# Patient Record
Sex: MALE | Race: WHITE | NOT HISPANIC OR LATINO | Employment: FULL TIME | URBAN - NONMETROPOLITAN AREA
[De-identification: names, ages, dates, MRNs, and addresses within clinical notes are randomized per-mention and may not be internally consistent; named-entity substitution may affect disease eponyms.]

---

## 2017-08-07 ENCOUNTER — HOSPITAL ENCOUNTER (EMERGENCY)
Facility: HOSPITAL | Age: 52
Discharge: HOME | End: 2017-08-07
Attending: NURSE PRACTITIONER | Admitting: NURSE PRACTITIONER
Payer: COMMERCIAL

## 2017-08-07 VITALS
BODY MASS INDEX: 30.13 KG/M2 | TEMPERATURE: 97.8 F | RESPIRATION RATE: 16 BRPM | OXYGEN SATURATION: 97 % | SYSTOLIC BLOOD PRESSURE: 139 MMHG | DIASTOLIC BLOOD PRESSURE: 97 MMHG | WEIGHT: 210 LBS

## 2017-08-07 DIAGNOSIS — L03.311 CELLULITIS OF ABDOMINAL WALL: ICD-10-CM

## 2017-08-07 PROCEDURE — 99213 OFFICE O/P EST LOW 20 MIN: CPT

## 2017-08-07 PROCEDURE — 99213 OFFICE O/P EST LOW 20 MIN: CPT | Performed by: NURSE PRACTITIONER

## 2017-08-07 PROCEDURE — 87070 CULTURE OTHR SPECIMN AEROBIC: CPT | Performed by: NURSE PRACTITIONER

## 2017-08-07 RX ORDER — SULFAMETHOXAZOLE/TRIMETHOPRIM 800-160 MG
1 TABLET ORAL 2 TIMES DAILY
Qty: 14 TABLET | Refills: 0 | Status: SHIPPED | OUTPATIENT
Start: 2017-08-07 | End: 2017-08-14

## 2017-08-07 NOTE — ED PROVIDER NOTES
"  History     Chief Complaint   Patient presents with     Wound Check     reddened area below umbilicus. pt states the small open area drained today after getting out of the shower. denies fever or chills.      The history is provided by the patient. No  was used.     Asif Leon is a 52 year old male who presents for a wound check. He's had a cyst on his abdomen \"for years\" that started draining 4 day ago. The cyst is red and he is concerned for infection. No interventions for symptoms. Denies injury or trauma. Denies fever, chills, or night sweats. Eating and drinking well. Bowel and bladder are working well. No antibiotic use in the past 30 days.     I have reviewed the Medications, Allergies, Past Medical and Surgical History, and Social History in the Epic system.    Review of Systems   Constitutional: Negative for activity change, appetite change, chills and fever.   HENT: Negative for trouble swallowing.    Respiratory: Negative for cough.    Gastrointestinal: Negative for abdominal pain, diarrhea, nausea and vomiting.   Genitourinary: Negative for dysuria.   Skin: Positive for color change and wound. Negative for rash.        On abdomen.    Psychiatric/Behavioral: Negative.        Physical Exam   BP: 139/97  Heart Rate: 78  Temp: 97.8  F (36.6  C)  Resp: 16  Weight: 95.3 kg (210 lb)  SpO2: 97 %  Physical Exam   Constitutional: He is oriented to person, place, and time. He appears well-developed and well-nourished. No distress.   HENT:   Head: Normocephalic.   Mouth/Throat: Oropharynx is clear and moist.   Neck: Normal range of motion. Neck supple.   Cardiovascular: Normal rate, regular rhythm, normal heart sounds and intact distal pulses.    No murmur heard.  Pulmonary/Chest: Effort normal. No respiratory distress. He has no wheezes. He has no rales.   Abdominal: Soft. He exhibits no distension.   Musculoskeletal: Normal range of motion.   Lymphadenopathy:     He has no cervical " adenopathy.   Neurological: He is alert and oriented to person, place, and time.   Skin: Skin is warm and dry. He is not diaphoretic. There is erythema.        4 cm X 3 cm area of erythema below umbilicus with white purulent drainage. Area of erythema is warm to the touch. TTP to area of erythema.    Psychiatric: He has a normal mood and affect.   Nursing note and vitals reviewed.      ED Course     ED Course     Procedures    Wound culture obtained.     Assessments & Plan (with Medical Decision Making)     Discussed plan of care. He verbalized understanding. All questions answered.     I have reviewed the nursing notes.    I have reviewed the findings, diagnosis, plan and need for follow up with the patient.  Discharged in stable condition.     New Prescriptions    SULFAMETHOXAZOLE-TRIMETHOPRIM (BACTRIM DS/SEPTRA DS) 800-160 MG PER TABLET    Take 1 tablet by mouth 2 times daily for 7 days       Final diagnoses:   Cellulitis of abdominal wall     Take antibiotics as ordered.   Eat a yogurt daily while taking antibiotics.   Apply a warm washcloth 3-4 times a day to area of redness. Protect skin.   Take tylenol and or ibuprofen for pain. Follow dosing on package.   Follow up with PCP in 10 days for wound check and BP check.   Return to urgent care or emergency department with any increase in symptoms or concerns.     CHINMAY Moncada  8/7/2017  5:04 PM  [facility]       Irma Blanchard NP  08/13/17 9460

## 2017-08-07 NOTE — ED AVS SNAPSHOT
[facility]    [address]    [place] [address]    Phone:  [phone]                                       Asif Leon   MRN: [id number]    Department:  [facility]   Date of Visit:  8/7/2017           After Visit Summary Signature Page     I have received my discharge instructions, and my questions have been answered. I have discussed any challenges I see with this plan with the nurse or doctor.    ..........................................................................................................................................  Patient/Patient Representative Signature      ..........................................................................................................................................  Patient Representative Print Name and Relationship to Patient    ..................................................               ................................................  Date                                            Time    ..........................................................................................................................................  Reviewed by Signature/Title    ...................................................              ..............................................  Date                                                            Time

## 2017-08-07 NOTE — DISCHARGE INSTRUCTIONS
Take antibiotics as ordered.   Eat a yogurt daily while taking antibiotics.   Apply a warm washcloth 3-4 times a day to area of redness. Protect skin.   Take tylenol and or ibuprofen for pain. Follow dosing on package.   Follow up with PCP in 10 days for wound check and BP check.   Return to urgent care or emergency department with any increase in symptoms or concerns.

## 2017-08-07 NOTE — ED NOTES
"Patient presents with sore below umbilic .  Drainage X 4 days with \"greenish\" color.  Red and sore X 4 days.  Cyst was there for 15/20 years.  "

## 2017-08-07 NOTE — ED AVS SNAPSHOT
[facility]    [address] [address]    Phone:  [phone]                                       Asif Leon   MRN: [id number]    Department:  [facility]   Date of Visit:  8/7/2017           Patient Information     Date Of Birth          1965        Your diagnoses for this visit were:     Cellulitis of abdominal wall        You were seen by Irma Blanchard NP.      Follow-up Information     Follow up with Laury Trivedi NP In 10 days.    Specialties:  Family Practice, Psychiatry    Why:  For wound and BP check.     Contact information:     [facility]  [address] [address] [phone]          Follow up with [facility].    Specialty:  EMERGENCY MEDICINE    Why:  As needed, If symptoms worsen    Contact information:    [address] [address] [phone]    Additional information:    From [place] Area: Take [address]. Turn left at [address]. Turn left at the first stoplight on [address]. At the first stop sign, take a right onto [address]. Take a left into the parking lot and continue through until you reach the North enterance of the building.       From [place]: Take [address]. Take the [address] ramp towards [place]. Turn left onto [address]. Take a slight right onto [address]. Turn left at the first stoplight on [address]. At the first stop sign, take a right onto [address]. Take a left into the parking lot and continue through until you reach the North enterance of the building.       From [place]: Take [address]. Take a right at [address]. At the first stop sign, take a right onto [address]. Take a left into the parking lot and continue through until you reach the North enterance of the building.         Discharge Instructions       Take antibiotics as ordered.   Eat a yogurt daily while  taking antibiotics.   Apply a warm washcloth 3-4 times a day to area of redness. Protect skin.   Take tylenol and or ibuprofen for pain. Follow dosing on package.   Follow up with PCP in 10 days for wound check and BP check.   Return to urgent care or emergency department with any increase in symptoms or concerns.     Discharge References/Attachments     SKIN INFECTION, CELLULITIS (ENGLISH)         Review of your medicines      START taking        Dose / Directions Last dose taken    sulfamethoxazole-trimethoprim 800-160 MG per tablet   Commonly known as:  BACTRIM DS/SEPTRA DS   Dose:  1 tablet   Quantity:  14 tablet        Take 1 tablet by mouth 2 times daily for 7 days   Refills:  0          Our records show that you are taking the medicines listed below. If these are incorrect, please call your family doctor or clinic.        Dose / Directions Last dose taken    HYDROcodone-acetaminophen 5-325 MG per tablet   Commonly known as:  NORCO   Dose:  1 tablet   Quantity:  40 tablet        Take 1 tablet by mouth nightly as needed for moderate to severe pain   Refills:  0        ibuprofen 800 MG tablet   Commonly known as:  ADVIL/MOTRIN   Dose:  800 mg   Quantity:  90 tablet        Take 1 tablet (800 mg) by mouth every 8 hours as needed for moderate pain   Refills:  1        NO ACTIVE MEDICATIONS        Refills:  0                Prescriptions were sent or printed at these locations (1 Prescription)                   [facility] [address] [place] [address] [address]    Telephone:  [phone]   Fax:  [phone]   Hours:                  E-Prescribed (1 of 1)         sulfamethoxazole-trimethoprim (BACTRIM DS/SEPTRA DS) 800-160 MG per tablet                Procedures and tests performed during your visit     Wound Culture Aerobic Bacterial      Orders Needing Specimen Collection     None      Pending Results     Date and Time Order Name Status Description    8/7/2017 5021  Wound Culture Aerobic Bacterial In process             Pending Culture Results     Date and Time Order Name Status Description    8/7/2017 1752 Wound Culture Aerobic Bacterial In process             Thank you for choosing [facility]       Thank you for choosing [facility] for your care. Our goal is always to provide you with excellent care. Hearing back from our patients is one way we can continue to improve our services. Please take a few minutes to complete the written survey that you may receive in the mail after you visit with us. Thank you!        Care EveryWhere ID     This is your Care EveryWhere ID. This could be used by other organizations to access your [facility] medical records  [id number]        Equal Access to Services     SONALI ALLEN : Carla Ingram, jorge rodriguez, alirio cam, salinas green. So [facility] [phone].    ATENCIÓN: Si habla español, tiene a porter disposición servicios gratuitos de asistencia lingüística. Llame al [phone].    We comply with applicable federal civil rights laws and [place] laws. We do not discriminate on the basis of race, color, national origin, age, disability sex, sexual orientation or gender identity.            After Visit Summary       This is your record. Keep this with you and show to your community pharmacist(s) and doctor(s) at your next visit.

## 2017-08-11 LAB
BACTERIA SPEC CULT: NORMAL
MICRO REPORT STATUS: NORMAL
SPECIMEN SOURCE: NORMAL

## 2017-08-13 ASSESSMENT — ENCOUNTER SYMPTOMS
DIARRHEA: 0
APPETITE CHANGE: 0
COUGH: 0
ACTIVITY CHANGE: 0
PSYCHIATRIC NEGATIVE: 1
COLOR CHANGE: 1
CHILLS: 0
WOUND: 1
FEVER: 0
NAUSEA: 0
TROUBLE SWALLOWING: 0
DYSURIA: 0
VOMITING: 0
ABDOMINAL PAIN: 0

## 2017-08-22 ENCOUNTER — OFFICE VISIT (OUTPATIENT)
Dept: FAMILY MEDICINE | Facility: OTHER | Age: 52
End: 2017-08-22
Attending: NURSE PRACTITIONER
Payer: COMMERCIAL

## 2017-08-22 VITALS
TEMPERATURE: 98 F | SYSTOLIC BLOOD PRESSURE: 124 MMHG | BODY MASS INDEX: 29.49 KG/M2 | DIASTOLIC BLOOD PRESSURE: 78 MMHG | OXYGEN SATURATION: 96 % | HEART RATE: 79 BPM | HEIGHT: 70 IN | RESPIRATION RATE: 20 BRPM | WEIGHT: 206 LBS

## 2017-08-22 DIAGNOSIS — J20.9 ACUTE BRONCHITIS WITH SYMPTOMS > 10 DAYS: ICD-10-CM

## 2017-08-22 DIAGNOSIS — R05.9 COUGH: Primary | ICD-10-CM

## 2017-08-22 PROCEDURE — 71020 ZZHC CHEST TWO VIEWS, FRONT/LAT: CPT | Mod: TC | Performed by: RADIOLOGY

## 2017-08-22 PROCEDURE — 99213 OFFICE O/P EST LOW 20 MIN: CPT | Performed by: NURSE PRACTITIONER

## 2017-08-22 RX ORDER — ALBUTEROL SULFATE 90 UG/1
2 AEROSOL, METERED RESPIRATORY (INHALATION) EVERY 4 HOURS PRN
Qty: 1 INHALER | Refills: 0 | Status: SHIPPED | OUTPATIENT
Start: 2017-08-22 | End: 2018-01-15

## 2017-08-22 RX ORDER — AZITHROMYCIN 250 MG/1
TABLET, FILM COATED ORAL
Qty: 6 TABLET | Refills: 0 | Status: SHIPPED | OUTPATIENT
Start: 2017-08-22 | End: 2018-01-15

## 2017-08-22 ASSESSMENT — ANXIETY QUESTIONNAIRES
5. BEING SO RESTLESS THAT IT IS HARD TO SIT STILL: SEVERAL DAYS
2. NOT BEING ABLE TO STOP OR CONTROL WORRYING: MORE THAN HALF THE DAYS
4. TROUBLE RELAXING: MORE THAN HALF THE DAYS
7. FEELING AFRAID AS IF SOMETHING AWFUL MIGHT HAPPEN: NOT AT ALL
GAD7 TOTAL SCORE: 11
6. BECOMING EASILY ANNOYED OR IRRITABLE: MORE THAN HALF THE DAYS
3. WORRYING TOO MUCH ABOUT DIFFERENT THINGS: MORE THAN HALF THE DAYS
IF YOU CHECKED OFF ANY PROBLEMS ON THIS QUESTIONNAIRE, HOW DIFFICULT HAVE THESE PROBLEMS MADE IT FOR YOU TO DO YOUR WORK, TAKE CARE OF THINGS AT HOME, OR GET ALONG WITH OTHER PEOPLE: SOMEWHAT DIFFICULT
1. FEELING NERVOUS, ANXIOUS, OR ON EDGE: MORE THAN HALF THE DAYS

## 2017-08-22 ASSESSMENT — PATIENT HEALTH QUESTIONNAIRE - PHQ9: SUM OF ALL RESPONSES TO PHQ QUESTIONS 1-9: 12

## 2017-08-22 ASSESSMENT — PAIN SCALES - GENERAL: PAINLEVEL: SEVERE PAIN (6)

## 2017-08-22 NOTE — PATIENT INSTRUCTIONS
ASSESSMENT/PLAN:       1. Cough  - XR CHEST 2 VW (Clinic Performed); clear    2. Acute bronchitis with symptoms > 10 days  symptomatic  - azithromycin (ZITHROMAX) 250 MG tablet; Two tablets first day, then one tablet daily for four days.  Dispense: 6 tablet; Refill: 0  - albuterol (PROAIR HFA/PROVENTIL HFA/VENTOLIN HFA) 108 (90 BASE) MCG/ACT Inhaler; Inhale 2 puffs into the lungs every 4 hours as needed  Dispense: 1 Inhaler; Refill: 0  - fluticasone-salmeterol (ADVAIR) 100-50 MCG/DOSE diskus inhaler; Inhale 1 puff into the lungs 2 times daily  Dispense: 1 Inhaler; Refill: 1    FUTURE APPOINTMENTS:       - Follow-up visit if no improvement or any worsening    Elizabeth Lugo, NP  [facility]    Psychologists/ counselors  [place]vel[place]  [place]  [phone]  Dr. Luis Silva [phone]  [facility]  [phone]  [facility] [phone]  Chris Jacob  [phone]   [employer]  [phone]  (kids)  [employer] [phone]  (teens)  [facility]      [phone]  [facility] [phone]    [facility]     [phone]   [facility] counseling [phone]  Josiah Reza [phone]  Sophia Frank [phone]  [facility]     [phone]  [facility] behavioral/ adult family     [phone]  [facility]     [phone]  Matthew Quesada  [phone]  [facility]     [phone]  [facility] Dr. VIOLETA Hathaway     [phone]

## 2017-08-22 NOTE — MR AVS SNAPSHOT
After Visit Summary   8/22/2017    Asif Leon    MRN: [id number]           Patient Information     Date Of Birth          1965        Visit Information        Provider Department      8/22/2017 4:15 PM Elizabeth Lugo NP [facility]        Today's Diagnoses     Cough    -  1    Acute bronchitis with symptoms > 10 days          Care Instructions      ASSESSMENT/PLAN:       1. Cough  - XR CHEST 2 VW (Clinic Performed); clear    2. Acute bronchitis with symptoms > 10 days  symptomatic  - azithromycin (ZITHROMAX) 250 MG tablet; Two tablets first day, then one tablet daily for four days.  Dispense: 6 tablet; Refill: 0  - albuterol (PROAIR HFA/PROVENTIL HFA/VENTOLIN HFA) 108 (90 BASE) MCG/ACT Inhaler; Inhale 2 puffs into the lungs every 4 hours as needed  Dispense: 1 Inhaler; Refill: 0  - fluticasone-salmeterol (ADVAIR) 100-50 MCG/DOSE diskus inhaler; Inhale 1 puff into the lungs 2 times daily  Dispense: 1 Inhaler; Refill: 1    FUTURE APPOINTMENTS:       - Follow-up visit if no improvement or any worsening    Elizabeth Lugo NP  [facility]    Psychologists/ counselors  Avelino [place]  [phone]  Dr. Luis Silva [phone]  [facility]  [phone]  [facility] [phone]  Chris Jacob  [phone]   [employer]  [phone]  (kids)  [employer] [phone]  (teens)  [facility]      [phone]  [facility] [phone]    [facility]     [phone]   [facility] counseling [phone]  Josiah Reza [phone]  Sophia Frank [phone]  [facility]     [phone]  Childrens behavioral/ adult family     [phone]  [facility]     [phone]  Matthew Quesada  [phone]  [facility]     [phone]  Ishan MCDANIEL  "Rivas     [phone]                        Follow-ups after your visit        Who to contact     If you have questions or need follow up information about today's clinic visit or your schedule please contact [facility] directly at [phone].  Normal or non-critical lab and imaging results will be communicated to you by MyChart, letter or phone within 4 business days after the clinic has received the results. If you do not hear from us within 7 days, please contact the clinic through MyChart or phone. If you have a critical or abnormal lab result, we will notify you by phone as soon as possible.  Submit refill requests through [employer] or call your pharmacy and they will forward the refill request to us. Please allow 3 business days for your refill to be completed.          Additional Information About Your Visit        Care EveryWhere ID     This is your Care EveryWhere ID. This could be used by other organizations to access your [place] medical records  [id number]        Your Vitals Were     Pulse Temperature Respirations Height Pulse Oximetry BMI (Body Mass Index)    79 98  F (36.7  C) (Tympanic) 20 5' 10\" (1.778 m) 96% 29.56 kg/m2       Blood Pressure from Last 3 Encounters:   08/22/17 124/78   08/07/17 139/97   02/16/15 114/80    Weight from Last 3 Encounters:   08/22/17 206 lb (93.4 kg)   08/07/17 210 lb (95.3 kg)   02/16/15 196 lb 9.6 oz (89.2 kg)              We Performed the Following     XR CHEST 2 VW (Clinic Performed)          Today's Medication Changes          These changes are accurate as of: 8/22/17  4:49 PM.  If you have any questions, ask your nurse or doctor.               Start taking these medicines.        Dose/Directions    albuterol 108 (90 BASE) MCG/ACT Inhaler   Commonly known as:  PROAIR HFA/PROVENTIL HFA/VENTOLIN HFA   Used for:  Acute bronchitis with symptoms > 10 days   Started by:  Elizabeth Lugo NP        Dose:  2 puff   Inhale 2 puffs into the lungs " every 4 hours as needed   Quantity:  1 Inhaler   Refills:  0       azithromycin 250 MG tablet   Commonly known as:  ZITHROMAX   Used for:  Acute bronchitis with symptoms > 10 days   Started by:  Elizabeth Lugo NP        Two tablets first day, then one tablet daily for four days.   Quantity:  6 tablet   Refills:  0       fluticasone-salmeterol 100-50 MCG/DOSE diskus inhaler   Commonly known as:  ADVAIR   Used for:  Acute bronchitis with symptoms > 10 days   Started by:  Elizabeth Lugo NP        Dose:  1 puff   Inhale 1 puff into the lungs 2 times daily   Quantity:  1 Inhaler   Refills:  1         Stop taking these medicines if you haven't already. Please contact your care team if you have questions.     NO ACTIVE MEDICATIONS   Stopped by:  Elizabeth Lugo NP                Where to get your medicines      These medications were sent to [place] [address] [place] [address] [address]     Phone:  [phone]     albuterol 108 (90 BASE) MCG/ACT Inhaler    azithromycin 250 MG tablet    fluticasone-salmeterol 100-50 MCG/DOSE diskus inhaler                Primary Care Provider Office Phone # Fax #    Laury GABRIEL Trivedi [phone]       [address] [address]        Equal Access to Services     SONALI ALLEN AH: Hadii berta henderson hadasho Soomaali, waaxda luqadaha, qaybta kaalmada dorina, salinas green. So [facility] [phone].    ATENCIÓN: Si habla español, tiene a porter disposición servicios gratuitos de asistencia lingüística. Roberth al [phone].    We comply with applicable federal civil rights laws and [place] laws. We do not discriminate on the basis of race, color, national origin, age, disability sex, sexual orientation or gender identity.            Thank you!     Thank you for choosing [facility]  for your care. Our goal is always to provide you with excellent  care. Hearing back from our patients is one way we can continue to improve our services. Please take a few minutes to complete the written survey that you may receive in the mail after your visit with us. Thank you!             Your Updated Medication List - Protect others around you: Learn how to safely use, store and throw away your medicines at www.disposemymeds.org.          This list is accurate as of: 8/22/17  4:49 PM.  Always use your most recent med list.                   Brand Name Dispense Instructions for use Diagnosis    albuterol 108 (90 BASE) MCG/ACT Inhaler    PROAIR HFA/PROVENTIL HFA/VENTOLIN HFA    1 Inhaler    Inhale 2 puffs into the lungs every 4 hours as needed    Acute bronchitis with symptoms > 10 days       azithromycin 250 MG tablet    ZITHROMAX    6 tablet    Two tablets first day, then one tablet daily for four days.    Acute bronchitis with symptoms > 10 days       fluticasone-salmeterol 100-50 MCG/DOSE diskus inhaler    ADVAIR    1 Inhaler    Inhale 1 puff into the lungs 2 times daily    Acute bronchitis with symptoms > 10 days

## 2017-08-22 NOTE — NURSING NOTE
"Chief Complaint   Patient presents with     Cough       Initial /78 (BP Location: Left arm, Patient Position: Chair, Cuff Size: Adult Large)  Pulse 79  Temp 98  F (36.7  C) (Tympanic)  Resp 20  Ht 5' 10\" (1.778 m)  Wt 206 lb (93.4 kg)  SpO2 96%  BMI 29.56 kg/m2 Estimated body mass index is 29.56 kg/(m^2) as calculated from the following:    Height as of this encounter: 5' 10\" (1.778 m).    Weight as of this encounter: 206 lb (93.4 kg).  Medication Reconciliation: complete   Pamela M Lechevalier LPN      "

## 2017-08-22 NOTE — PROGRESS NOTES
SUBJECTIVE:   Asif Leon is a 52 year old male who presents to clinic today for the following health issues:  Cough     Acute Illness   Acute illness concerns: cough with shortness of breath  Onset: a week onset after antibiotic 8/15/17    Fever: no     Chills/Sweats: no     Headache (location?): no     Sinus Pressure:no    Conjunctivitis:  no    Ear Pain: no    Rhinorrhea: no     Congestion: YES    Sore Throat: no      Cough: YES-productive of yellow sputum    Wheeze: YES    Decreased Appetite: no     Nausea: no     Vomiting: no     Diarrhea:  no     Dysuria/Freq.: no     Fatigue/Achiness: YES    Sick/Strep Exposure: no      Therapies Tried and outcome: ibuprofen at times        Problem list and histories reviewed & adjusted, as indicated.  Additional history: as documented    There is no problem list on file for this patient.    Past Surgical History:   Procedure Laterality Date     COLONOSCOPY  2011    f/u polyectomy/ family HX crohns     colonoscopy with polypectomy  1999    stomache pain/family hx     ORTHOPEDIC SURGERY      shoulder surgery     RECESSION RESECTION (REPAIR STRABISMUS) BILATERAL  10/18/2011    Procedure:RECESSION RESECTION (REPAIR STRABISMUS) BILATERAL; Strabismus Repair Right; Surgeon:MERCEDEZ CHAUHAN; Location: OR       Social History   Substance Use Topics     Smoking status: Never Smoker     Smokeless tobacco: Never Used     Alcohol use Yes      Comment: occasional     Family History   Problem Relation Age of Onset     Crohn Disease Father      Cause of Death     CANCER Mother      Brain - Cause of Death     Crohn Disease Sister          No current outpatient prescriptions on file.     Allergies   Allergen Reactions     No Clinical Screening - See Comments      Seasonal allergies     Recent Labs   Lab Test  12/17/14   0902   TSH  2.90      BP Readings from Last 3 Encounters:   08/22/17 124/78   08/07/17 139/97   02/16/15 114/80    Wt Readings from Last 3 Encounters:   08/22/17 206  "lb (93.4 kg)   08/07/17 210 lb (95.3 kg)   02/16/15 196 lb 9.6 oz (89.2 kg)                 Reviewed and updated as needed this visit by clinical staffTobacco  Allergies       Reviewed and updated as needed this visit by Provider         ROS:  Constitutional, HEENT, cardiovascular, pulmonary, gi and gu systems are negative, except as otherwise noted.      OBJECTIVE:   /78 (BP Location: Left arm, Patient Position: Chair, Cuff Size: Adult Large)  Pulse 79  Temp 98  F (36.7  C) (Tympanic)  Resp 20  Ht 5' 10\" (1.778 m)  Wt 206 lb (93.4 kg)  SpO2 96%  BMI 29.56 kg/m2  Body mass index is 29.56 kg/(m^2).  GENERAL: healthy, alert and no distress  HENT: normal cephalic/atraumatic, both ears: dull, erythematous, nasal mucosa edematous , rhinorrhea yellow and oral mucous membranes moist, throat erythematous  NECK: no adenopathy, no asymmetry, masses, or scars and thyroid normal to palpation  RESP:  Wheezing through with bronchospasm on inspiration.   CV: regular rate and rhythm, normal S1 S2, no S3 or S4, no murmur, click or rub, no peripheral edema and peripheral pulses strong  MS: no gross musculoskeletal defects noted, no edema  PSYCH: mentation appears normal, affect normal/bright        ASSESSMENT/PLAN:       1. Cough  - XR CHEST 2 VW (Clinic Performed); clear    2. Acute bronchitis with symptoms > 10 days  symptomatic  - azithromycin (ZITHROMAX) 250 MG tablet; Two tablets first day, then one tablet daily for four days.  Dispense: 6 tablet; Refill: 0  - albuterol (PROAIR HFA/PROVENTIL HFA/VENTOLIN HFA) 108 (90 BASE) MCG/ACT Inhaler; Inhale 2 puffs into the lungs every 4 hours as needed  Dispense: 1 Inhaler; Refill: 0  - fluticasone-salmeterol (ADVAIR) 100-50 MCG/DOSE diskus inhaler; Inhale 1 puff into the lungs 2 times daily  Dispense: 1 Inhaler; Refill: 1    FUTURE APPOINTMENTS:       - Follow-up visit if no improvement or any worsening    Elizabeth Lugo NP  [facility]"

## 2017-08-23 ASSESSMENT — ANXIETY QUESTIONNAIRES: GAD7 TOTAL SCORE: 11

## 2018-01-03 ENCOUNTER — TRANSFERRED RECORDS (OUTPATIENT)
Dept: HEALTH INFORMATION MANAGEMENT | Facility: HOSPITAL | Age: 53
End: 2018-01-03

## 2018-01-15 ENCOUNTER — OFFICE VISIT (OUTPATIENT)
Dept: FAMILY MEDICINE | Facility: OTHER | Age: 53
End: 2018-01-15
Attending: NURSE PRACTITIONER
Payer: COMMERCIAL

## 2018-01-15 VITALS
TEMPERATURE: 97.8 F | HEART RATE: 68 BPM | DIASTOLIC BLOOD PRESSURE: 70 MMHG | RESPIRATION RATE: 14 BRPM | SYSTOLIC BLOOD PRESSURE: 120 MMHG | BODY MASS INDEX: 30.49 KG/M2 | WEIGHT: 213 LBS | HEIGHT: 70 IN

## 2018-01-15 DIAGNOSIS — G89.29 CHRONIC MIDLINE LOW BACK PAIN, WITH SCIATICA PRESENCE UNSPECIFIED: ICD-10-CM

## 2018-01-15 DIAGNOSIS — Z12.5 SCREENING FOR PROSTATE CANCER: ICD-10-CM

## 2018-01-15 DIAGNOSIS — M54.5 CHRONIC MIDLINE LOW BACK PAIN, WITH SCIATICA PRESENCE UNSPECIFIED: ICD-10-CM

## 2018-01-15 DIAGNOSIS — R53.83 FATIGUE, UNSPECIFIED TYPE: Primary | ICD-10-CM

## 2018-01-15 DIAGNOSIS — R41.3 MEMORY CHANGE: ICD-10-CM

## 2018-01-15 DIAGNOSIS — F41.9 ANXIETY: ICD-10-CM

## 2018-01-15 DIAGNOSIS — Z00.00 ROUTINE GENERAL MEDICAL EXAMINATION AT A HEALTH CARE FACILITY: ICD-10-CM

## 2018-01-15 PROBLEM — F51.01 PRIMARY INSOMNIA: Status: ACTIVE | Noted: 2018-01-15

## 2018-01-15 PROBLEM — M54.50 CHRONIC MIDLINE LOW BACK PAIN WITHOUT SCIATICA: Status: ACTIVE | Noted: 2018-01-15

## 2018-01-15 PROCEDURE — 99214 OFFICE O/P EST MOD 30 MIN: CPT | Performed by: NURSE PRACTITIONER

## 2018-01-15 RX ORDER — ESCITALOPRAM OXALATE 10 MG/1
10 TABLET ORAL DAILY
Qty: 30 TABLET | Refills: 1 | Status: SHIPPED | OUTPATIENT
Start: 2018-01-15 | End: 2018-02-05

## 2018-01-15 RX ORDER — CYCLOBENZAPRINE HCL 10 MG
10 TABLET ORAL
Qty: 30 TABLET | Refills: 5 | Status: SHIPPED | OUTPATIENT
Start: 2018-01-15 | End: 2020-06-22

## 2018-01-15 ASSESSMENT — ANXIETY QUESTIONNAIRES
2. NOT BEING ABLE TO STOP OR CONTROL WORRYING: MORE THAN HALF THE DAYS
3. WORRYING TOO MUCH ABOUT DIFFERENT THINGS: MORE THAN HALF THE DAYS
GAD7 TOTAL SCORE: 12
6. BECOMING EASILY ANNOYED OR IRRITABLE: NEARLY EVERY DAY
1. FEELING NERVOUS, ANXIOUS, OR ON EDGE: MORE THAN HALF THE DAYS
IF YOU CHECKED OFF ANY PROBLEMS ON THIS QUESTIONNAIRE, HOW DIFFICULT HAVE THESE PROBLEMS MADE IT FOR YOU TO DO YOUR WORK, TAKE CARE OF THINGS AT HOME, OR GET ALONG WITH OTHER PEOPLE: NOT DIFFICULT AT ALL
5. BEING SO RESTLESS THAT IT IS HARD TO SIT STILL: SEVERAL DAYS
7. FEELING AFRAID AS IF SOMETHING AWFUL MIGHT HAPPEN: SEVERAL DAYS

## 2018-01-15 ASSESSMENT — PATIENT HEALTH QUESTIONNAIRE - PHQ9
SUM OF ALL RESPONSES TO PHQ QUESTIONS 1-9: 7
5. POOR APPETITE OR OVEREATING: SEVERAL DAYS

## 2018-01-15 NOTE — NURSING NOTE
"Chief Complaint   Patient presents with     [facility] F/U     [facility]       Initial /70 (BP Location: Left arm, Patient Position: Sitting, Cuff Size: Adult Large)  Pulse 68  Temp 97.8  F (36.6  C)  Resp 14  Ht 5' 10\" (1.778 m)  Wt 213 lb (96.6 kg)  BMI 30.56 kg/m2 Estimated body mass index is 30.56 kg/(m^2) as calculated from the following:    Height as of this encounter: 5' 10\" (1.778 m).    Weight as of this encounter: 213 lb (96.6 kg).  Medication Reconciliation: complete     Amrita Powell      "

## 2018-01-15 NOTE — PROGRESS NOTES
SUBJECTIVE:   Asif Leon is a 52 year old male who presents to clinic today for the following health issues:      Hospital Follow-up Visit:    Hospital/Nursing Home/IP Rehab Facility:  Date of Admission: 1-2-18  Date of Discharge: 1-4-18  Reason(s) for Admission: transient global amnesia             Problems taking medications regularly:  None       Medication changes since discharge: None       Problems adhering to non-medication therapy:  None      Summary of hospitalization:  See care everywhere  Diagnostic Tests/Treatments reviewed.  Follow up needed: none  Other Healthcare Providers Involved in Patient s Care:         None  Update since discharge: improved.    Post Discharge Medication Reconciliation: discharge medications reconciled, continue medications without change.  Plan of care communicated with patient     Coding guidelines for this visit:  Type of Medical   Decision Making Face-to-Face Visit       within 7 Days of discharge Face-to-Face Visit        within 14 days of discharge   Moderate Complexity [id number]   High Complexity [id number]              Notes excessive worry, changing focus over time due to worry.  Poor sleep - chronic, due to back pain.    PHQ-9 SCORE 8/22/2017 1/15/2018   Total Score 12 7       GIOVANY-7 SCORE 8/22/2017 1/15/2018   Total Score 11 12       Muscle spasms, low back, sleeps poorly        EEG (01/04/2018 11:06 AM)  EEG (01/04/2018 11:06 AM)   Narrative   Swapnil Vu MD     1/4/2018  5:05 PM    PROCEDURE: EEG, routine        INDICATION: spell        TECHNIQUE:         Continuous digital 23 channel EEG was performed with a total     recording duration of greater than 20 minutes. Photic stimulation     was performed. The patient was not intentionally deprived of     sleep on the night prior to examination.         Antiepileptic medications: none        FINDINGS:         Posterior dominant rhythm:  11 Hz.        Diffuse slowing: None        Focal slowing: None         Other relevant findings: None        Sleep captured: no        Epileptiform findings: none        IMPRESSION:        Normal waking/drowsy EEG, duration greater than 20 minutes. No     evidence of ictal or interictal epileptiform finding.     Back to top of Results      MR BRAIN WO CONTRAST (01/03/2018 7:23 PM)  MR BRAIN WO CONTRAST (01/03/2018 7:23 PM)   Specimen Performing Laboratory     POWERSCRIBE       MR BRAIN WO CONTRAST (01/03/2018 7:23 PM)   Narrative   This document is currently in Final Status        Exam Accession# [id number]        MR BRAIN WO CONTRAST        HISTORY: Transient global amnesia vs CVA vs hypertensive encephalopathy;         FINDINGS: No abnormal restricted diffusion.        No acute intracranial hemorrhage, mass effect, or midline shift. Normal flow voids are preserved. Orbits and globes, unremarkable. Ventricular caliber is normal. Brain volume is normal. No parenchymal abnormality is identified. No extra-axial collection. Midline structures are unremarkable.        IMPRESSION: No acute intracranial abnormality.         Dictated By: Braydon Velazquez 1/3/2018 7:26 PM    Edited By: JOSE D 1/3/2018 7:35 PM        Electronically Signed: Braydon Velazquez 1/4/2018 9:00 AM           MR BRAIN WO CONTRAST (01/03/2018 7:23 PM)   Procedure Note   Interface, [facility] Incoming Radiology Results - 01/04/2018 9:05 AM CST    This document is currently in Final Status    Exam Accession# [id number]    MR BRAIN WO CONTRAST    HISTORY: Transient global amnesia vs CVA vs hypertensive encephalopathy;     FINDINGS: No abnormal restricted diffusion.    No acute intracranial hemorrhage, mass effect, or midline shift. Normal flow voids are preserved. Orbits and globes, unremarkable. Ventricular caliber is normal. Brain volume is normal. No parenchymal abnormality is identified. No extra-axial collection. Midline structures are unremarkable.    IMPRESSION: No acute intracranial abnormality.     Dictated By:  Braydon Velazquez 1/3/2018 7:26 PM  Edited By: JOSE D 1/3/2018 7:35 PM    Electronically Signed: Braydon Velazquez 1/4/2018 9:00 AM       Back to top of Results      CT ANGIO HEAD NECK (01/02/2018 9:55 PM)  CT ANGIO HEAD NECK (01/02/2018 9:55 PM)   Specimen Performing Laboratory     POWERSCRIBE       CT ANGIO HEAD NECK (01/02/2018 9:55 PM)   Narrative   This document is currently in Final Status        Exam Accession# [id number]        EXAM: CT ANGIO HEAD NECK        HISTORY: elevated BP, confusion, negative initial Ct head;         TECHNIQUE: CT angiogram of the head and neck with IV contrast. 3-D reconstructions were performed as well.        COMPARISON: None available        FINDINGS:    Neck:    Bovine aortic arch. Minimal calcified atherosclerotic plaque right carotid bifurcation and carotid bulb. No significant stenosis or aneurysmal dilatation or dissection of the major cervical arteries. Mild multilevel cervical spondylosis. Mild biapical dependent atelectasis.        Head:    Mild/moderate approximately 33% stenosis of the proximal left P1. No significant stenosis, aneurysmal dilatation, or dissection of the major intracranial arteries. 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        IMPRESSION:    No significant stenosis, no aneurysmal dilatation or dissection of the major cervical and intracranial arteries.        Electronically Signed: Shai Martines MD 1/2/2018 11:53 PM           CT ANGIO HEAD NECK (01/02/2018 9:55 PM)   Procedure Note   Interface, [facility] Incoming Radiology Results - 01/02/2018 11:58 PM CST    This document is currently in Final Status    Exam Accession# [id number]    EXAM: CT ANGIO HEAD NECK    HISTORY: elevated BP, confusion, negative initial Ct head;     TECHNIQUE: CT angiogram of the head and neck with IV contrast. 3-D reconstructions were performed as well.    COMPARISON: None available    FINDINGS:  Neck:  Bovine aortic arch. Minimal calcified atherosclerotic  plaque right carotid bifurcation and carotid bulb. No significant stenosis or aneurysmal dilatation or dissection of the major cervical arteries. Mild multilevel cervical spondylosis. Mild biapical dependent atelectasis.    Head:  Mild/moderate approximately 33% stenosis of the proximal left P1. No significant stenosis, aneurysmal dilatation, or dissection of the major intracranial arteries. 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    IMPRESSION:  No significant stenosis, no aneurysmal dilatation or dissection of the major cervical and intracranial arteries.    Electronically Signed: Shai Martines MD 1/2/2018 11:53 PM       Back to top of Results      EKG 12-LEAD (01/02/2018 9:05 PM)  EKG 12-LEAD (01/02/2018 9:05 PM)   Component Value Ref Range   Ventricular Rate 80 BPM   Atrial Rate 80 BPM   P-R Interval 148 ms   QRS Duration 90 ms    ms   QTc 445 ms   P Axis 46 degrees   R Axis 13 degrees   T Axis 26 degrees     EKG 12-LEAD (01/02/2018 9:05 PM)   Specimen Performing Laboratory     MUSE       EKG 12-LEAD (01/02/2018 9:05 PM)   Narrative   Confirming Leonides VILLEGAS MD     Poor data quality, interpretation may be adversely affected    Normal sinus rhythm    Normal ECG    When compared with ECG of 14-APR-2012 21:40,    No significant change was found       EKG 12-LEAD (01/02/2018 9:05 PM)   Procedure Note   Interface, [facility] Cardiology Results - 01/03/2018 6:34 AM CST    Confirming Leonides VILLEGAS MD   Poor data quality, interpretation may be adversely affected  Normal sinus rhythm  Normal ECG  When compared with ECG of 14-APR-2012 21:40,  No significant change was found     Back to top of Results      SLIDE REVIEW FOR CBC (01/02/2018 8:58 PM)  SLIDE REVIEW FOR CBC (01/02/2018 8:58 PM)   Component Value Ref Range   Slide Review Performed     Platelet Morphology Normal       SLIDE REVIEW FOR CBC (01/02/2018 8:58 PM)   Specimen Performing Laboratory   Blood [facility]  LABORATORY    [address] [place] [address]     Back to top of Results      BASIC METABOLIC PANEL (01/02/2018 8:58 PM)  BASIC METABOLIC PANEL (01/02/2018 8:58 PM)   Component Value Ref Range   Sodium 140 134 - 143 mEq/L   Potassium 4.1 3.4 - 5.1 mEq/L   Chloride 104 99 - 110 mEq/L   Carbon Dioxide 26 19 - 29 mEq/L   Anion Gap 10.0 3.0 - 15.0 mEq/L   Blood Urea Nitrogen 17 5 - 24 mg/dL   Creatinine 1.12 0.70 - 1.20 mg/dL   Glomerular Filtration Rate >60Comment: Complications of CKD and risk of cardiovascular disease increase when GFR is below 60ml.min/1.73m2. A persistently reduced GFR is a specific indication of Chronic Kidney Disease. The eGFR calculation has not been validated in patients >70yrs. >60 mL/min/1.73 m*2   Calcium 10.0 8.4 - 10.5 mg/dL   Glucose 95 70 - 100 mg/dL     BASIC METABOLIC PANEL (01/02/2018 8:58 PM)   Specimen Performing Laboratory   Blood [facility] LABORATORY    [address] [place] [address]       BASIC METABOLIC PANEL (01/02/2018 8:58 PM)   Narrative       Current ADA criteria for Glucose:         Normal: 70-99 mg/dL        Impaired Fasting Glucose: 100-125 mg/dL        Diabetes Mellitus: at or above 126 mg/dL    The diagnosis of diabetes must be confirmed on a subsequent day by measuring Fasting Plasma Glucose, 2-hr PG or random plasma glucose (if symptoms are present).     Back to top of Results      HEPATIC FUNCTION PANEL (01/02/2018 8:58 PM)  HEPATIC FUNCTION PANEL (01/02/2018 8:58 PM)   Component Value Ref Range   Alkaline Phosphatase 69 40 - 150 IU/L   Bilirubin, Total 0.5 0.2 - 1.2 mg/dL   Bilirubin, Direct 0.2 0.0 - 0.5 mg/dL   Bilirubin, Indirect 0.3 0.1 - 1.2 mg/dL   Albumin 4.4 3.5 - 5.0 g/dL   Protein, Total 7.7 6.0 - 8.0 g/dL   Aspartate Aminotransferase 39 10 - 40 IU/L   Alanine Aminotransferase 37 6 - 40 IU/L     HEPATIC FUNCTION PANEL (01/02/2018 8:58 PM)   Specimen Performing Laboratory   Blood [facility] LABORATORY    [address] [place] [address]  [place] [address]     Back to top of Results      TROPONIN I (01/02/2018 8:58 PM)  TROPONIN I (01/02/2018 8:58 PM)   Component Value Ref Range   Troponin I 0.000 0 - 0.028 ng/mL     TROPONIN I (01/02/2018 8:58 PM)   Specimen Performing Laboratory   Blood [facility] LABORATORY    [address] [place] [address]     Back to top of Results      HEMOGRAM/DIFFERENTIAL (01/02/2018 8:58 PM)  HEMOGRAM/DIFFERENTIAL (01/02/2018 8:58 PM)   Component Value Ref Range   WBC 8.3 3.4 - 10.7 10*9/L   RBC 5.21 4.20 - 5.90 10*12/L   HGB 14.0 13.0 - 17.0 g/dL   HCT 42.4 37.5 - 51.0 %   MCV 81.3 (L) 82.0 - 99.0 fL   MCH 26.9 (L) 27.0 - 34.0 pg   MCHC 33.1 32.0 - 35.7 g/dL   RDW 14.6 11.0 - 15.0 %    150 - 400 10*9/L   Platelet Slide Review Adequate     Neutrophils % 64.1 %   Lymphocytes % 25.9 %   Monocytes % 7.9 %   Eosinophils % 1.0 %   Basophils % 1.1 %   Neutrophils Absolute 5.2 1.7 - 8.5 10*9/L   Lymphocytes Absolute 2.2 0.9 - 3.6 10*9/L   Monocytes Absolute 0.7 0.3 - 1.0 10*9/L   Eosinophils Absolute 0.1 0.0 - 0.6 10*9/L   Basophils Absolute 0.1 0.0 - 0.3 10*9/L     HEMOGRAM/DIFFERENTIAL (01/02/2018 8:58 PM)   Specimen Performing Laboratory   Blood [facility] LABORATORY    [address] [place] [address]     Back to top of Results      PROTIME (01/02/2018 8:58 PM)  PROTIME (01/02/2018 8:58 PM)   Component Value Ref Range   INR 1.0 0.9 - 1.1   Protime 13.0 12.1 - 14.4 sec     PROTIME (01/02/2018 8:58 PM)   Specimen Performing Laboratory   Blood [facility] LABORATORY    [address] [place] [id number]       PROTIME (01/02/2018 8:58 PM)   Narrative   SUGGESTED THERAPEUTIC INR RANGES FOR ORAL ANTICOAGULANT THERAPY        CATEGORY                           INR        PROPHYLAXIS                      2.0-3.0    TREAT THROMBOSIS OR EMBOLISM     2.0-3.0    PROSTHETIC HEART VALVE           2.5-3.5     Back to top of Results      CREATINE KINASE MB (01/02/2018 8:58  PM)  CREATINE KINASE MB (01/02/2018 8:58 PM)   Component Value Ref Range   Creatine Kinase MB (CKMB) 5.1 (H) 0 - 5.0 ng/mL     CREATINE KINASE MB (01/02/2018 8:58 PM)   Specimen Performing Laboratory   Blood [facility] LABORATORY    [address] [place] [address] [place] [address]     Back to top of Results      MAGNESIUM (01/02/2018 8:58 PM)  MAGNESIUM (01/02/2018 8:58 PM)   Component Value Ref Range   Magnesium 1.8 1.5 - 2.2 mEq/L     MAGNESIUM (01/02/2018 8:58 PM)   Specimen Performing Laboratory   Blood [facility] LABORATORY    [address] [place] [address]     Back to top of Results      LACTIC ACID, VENOUS (01/02/2018 8:58 PM)  LACTIC ACID, VENOUS (01/02/2018 8:58 PM)   Component Value Ref Range   Lactic Acid, Venous 1.0 0.5 - 2.0 mmol/L     LACTIC ACID, VENOUS (01/02/2018 8:58 PM)   Specimen Performing Laboratory   Blood [facility] LABORATORY    [address] [place] [address]     Back to top of Results      THYROID SCREEN WITH REFLEX (01/02/2018 8:58 PM)  THYROID SCREEN WITH REFLEX (01/02/2018 8:58 PM)   Component Value Ref Range   Thyroid Stimulating Hormone 3.62 0.40 - 3.99 uIU/mL     THYROID SCREEN WITH REFLEX (01/02/2018 8:58 PM)   Specimen Performing Laboratory   Blood [facility] LABORATORY    [address] [place] [phone]     Back to top of Results      CREATINE KINASE (01/02/2018 8:58 PM)  CREATINE KINASE (01/02/2018 8:58 PM)   Component Value Ref Range   Creatine Kinase (CK) 402 (H) 30 - 236 IU/L     CREATINE KINASE (01/02/2018 8:58 PM)   Specimen Performing Laboratory   Blood [address] [place] [address]     Back to top of Results      PHOSPHORUS (01/02/2018 8:58 PM)  PHOSPHORUS (01/02/2018 8:58 PM)   Component Value Ref Range   Phosphorus 3.1 2.5 - 4.6 mg/dL     PHOSPHORUS (01/02/2018 8:58 PM)   Specimen Performing Laboratory   Blood [address] [place] [address]     Back to top of Results      XR CHEST 1 VIEW (01/02/2018 8:51 PM)  XR CHEST 1 VIEW (01/02/2018 8:51 PM)   Specimen Performing Laboratory     POWERSCRIBE       XR CHEST 1 VIEW (01/02/2018 8:51 PM)   Narrative   This document is currently in Final Status        Exam Accession# [id number]        EXAM: XR CHEST 1 VIEW        HISTORY: confusion,  possible cva;         COMPARISON: None available        FINDINGS/IMPRESSION:    Mild cardiomegaly without CHF. Mild bibasilar discoid atelectasis. No other acute process. Thoracic spondylosis.        Electronically Signed: Shai Martines MD 1/2/2018 10:16 PM           XR CHEST 1 VIEW (01/02/2018 8:51 PM)   Procedure Note   Interface, [facility] Incoming Radiology Results - 01/02/2018 10:21 PM CST    This document is currently in Final Status    Exam Accession# [id number]    EXAM: XR CHEST 1 VIEW    HISTORY: confusion, possible cva;     COMPARISON: None available    FINDINGS/IMPRESSION:  Mild cardiomegaly without CHF. Mild bibasilar discoid atelectasis. No other acute process. Thoracic spondylosis.    Electronically Signed: Shai Martines MD 1/2/2018 10:16 PM       Back to top of Results      CT HEAD WO CONTRAST (01/02/2018 8:48 PM)  CT HEAD WO CONTRAST (01/02/2018 8:48 PM)   Specimen Performing Laboratory     POWERSCRIBE       CT HEAD WO CONTRAST (01/02/2018 8:48 PM)   Narrative   This document is currently in Final Status        Exam Accession# [id number]        PROCEDURE: CT HEAD WO CONTRAST        HISTORY: confusion, possible cva;         TECHNIQUE: Unenhanced images of the brain were obtained.         COMPARISON: None available.        FINDINGS:     The intra and extra-axial CSF spaces are within normal limits. Minimal basal ganglia calcifications nonspecific, probably age-related. There is no hemorrhage, midline shift or mass effect. Gray-white matter differentiation is preserved. No craniocerebral trauma. The skull base structures are unremarkable. Orbits  unremarkable. Clear middle ears, partially visualized paranasal sinuses and mastoid air cells.        IMPRESSION:     No CT evidence of an acute intracranial process.        The above findings were discussed by phone with Dr. Botello at 9:00 PM.        Electronically Signed: Shai Martines MD 1/2/2018 10:15 PM           CT HEAD WO CONTRAST (01/02/2018 8:48 PM)   Procedure Note   Interface, [facility] Incoming Radiology Results - 01/02/2018 10:20 PM CST    This document is currently in Final Status    Exam Accession# [id number]    PROCEDURE: CT HEAD WO CONTRAST    HISTORY: confusion, possible cva;     TECHNIQUE: Unenhanced images of the brain were obtained.     COMPARISON: None available.    FINDINGS:   The intra and extra-axial CSF spaces are within normal limits. Minimal basal ganglia calcifications nonspecific, probably age-related. There is no hemorrhage, midline shift or mass effect. Gray-white matter differentiation is preserved. No craniocerebral trauma. The skull base structures are unremarkable. Orbits unremarkable. Clear middle ears, partially visualized paranasal sinuses and mastoid air cells.    IMPRESSION:   No CT evidence of an acute intracranial process.    The above findings were discussed by phone with Dr. Botello at 9:00 PM.    Electronically Signed: Shai Martines MD 1/2/2018 10:15 PM             Problem list and histories reviewed & adjusted, as indicated.  Additional history: as documented    There is no problem list on file for this patient.    Past Surgical History:   Procedure Laterality Date     COLONOSCOPY  2011    f/u polyectomy/ family HX crohns     colonoscopy with polypectomy  1999    stomache pain/family hx     ORTHOPEDIC SURGERY      shoulder surgery     RECESSION RESECTION (REPAIR STRABISMUS) BILATERAL  10/18/2011    Procedure:RECESSION RESECTION (REPAIR STRABISMUS) BILATERAL; Strabismus Repair Right; Surgeon:MERCEDEZ CHAUHAN; Location:[facility] OR       Social History   Substance Use Topics      Smoking status: Never Smoker     Smokeless tobacco: Never Used     Alcohol use Yes      Comment: occasional     Family History   Problem Relation Age of Onset     CANCER Mother      Brain - Cause of Death     Crohn Disease Father      Cause of Death     Crohn Disease Sister          Current Outpatient Prescriptions   Medication Sig Dispense Refill     albuterol (PROAIR HFA/PROVENTIL HFA/VENTOLIN HFA) 108 (90 BASE) MCG/ACT Inhaler Inhale 2 puffs into the lungs every 4 hours as needed 1 Inhaler 0     fluticasone-salmeterol (ADVAIR) 100-50 MCG/DOSE diskus inhaler Inhale 1 puff into the lungs 2 times daily 1 Inhaler 1     Allergies   Allergen Reactions     No Clinical Screening - See Comments      Seasonal allergies     Recent Labs   Lab Test  12/17/14   0902   TSH  2.90      BP Readings from Last 3 Encounters:   01/15/18 120/70   08/22/17 124/78   08/07/17 139/97    Wt Readings from Last 3 Encounters:   01/15/18 213 lb (96.6 kg)   08/22/17 206 lb (93.4 kg)   08/07/17 210 lb (95.3 kg)                  Labs reviewed in EPIC          Reviewed and updated as needed this [facility]  [facility]       Reviewed and updated as needed this visit by Provider         ROS:  C: NEGATIVE for fever, chills, change in weight  I: NEGATIVE for worrisome rashes, moles or lesions  E: NEGATIVE for vision changes or irritation  E/M: NEGATIVE for ear, mouth and throat problems  R: NEGATIVE for significant cough or SOB  CV: NEGATIVE for chest pain, palpitations or peripheral edema  GI: NEGATIVE for nausea, abdominal pain, heartburn, or change in bowel habits  : NEGATIVE for frequency, dysuria, or hematuria  M: NEGATIVE for significant arthralgias or myalgia  NEURO: as above  E: NEGATIVE for temperature intolerance, skin/hair changes  H: NEGATIVE for bleeding problems  PSYCHIATRIC: as above    OBJECTIVE:     /70 (BP Location: Left arm, Patient Position: Sitting, Cuff Size: Adult Large)  Pulse 68  Temp 97.8  F (36.6  " C)  Resp 14  Ht 5' 10\" (1.778 m)  Wt 213 lb (96.6 kg)  BMI 30.56 kg/m2  Body mass index is 30.56 kg/(m^2).       GENERAL: healthy, alert and no distress  EYES: Eyes grossly normal to inspection, PERRL and conjunctivae and sclerae normal  HENT: ear canals and TM's normal, nose and mouth without ulcers or lesions  NECK: no adenopathy, no asymmetry, masses, or scars and thyroid normal to palpation  RESP: lungs clear to auscultation - no rales, rhonchi or wheezes  CV: regular rate and rhythm, normal S1 S2, no S3 or S4, no murmur, click or rub, no peripheral edema and peripheral pulses strong  MS: no gross musculoskeletal defects noted, no edema  SKIN: no suspicious lesions or rashes  NEURO: Normal strength and tone, mentation intact and speech normal  PSYCH: mood down, affect blunted, anxious      Visit time:   Over 30 minutes are spent with the patient, over 50% of which was in education and counseling regarding current conditions and treatment/therapy.  Time spent with patient, including examination, face to face patient education, review of disease process, and plan of care  15 mn  Visit Content: During our face to face time, patient education was provided regarding diagnosis, and treatment pan.  Relevant laboratory and diagnostic testing is reviewed prior to visit, and updated at this appointment as indicated  Health Maintenance - updated as appropriate.  Record review completed        ASSESSMENT/PLAN:     1. Fatigue, unspecified type  - TSH with free T4 reflex; Future  - Vitamin B12; Future  - Vitamin D Deficiency; Future    2. Anxiety  - TSH with free T4 reflex; Future  - escitalopram (LEXAPRO) 10 MG tablet; Take 1 tablet (10 mg) by mouth daily  Dispense: 30 tablet; Refill: 1    3. Screening for prostate cancer  - PSA, screen; Future    4. Memory change  - TSH with free T4 reflex; Future  - Vitamin B12; Future  - Vitamin D Deficiency; Future    5. Routine general medical examination at a [facility] facility  - Lipid Profile; Future    6. Chronic midline low back pain, with sciatica presence unspecified  - cyclobenzaprine (FLEXERIL) 10 MG tablet; Take 1 tablet (10 mg) by mouth nightly as needed for muscle spasms  Dispense: 30 tablet; Refill: 5        Laury Trivedi NP  [facility]

## 2018-01-15 NOTE — PATIENT INSTRUCTIONS
ASSESSMENT/PLAN:     1. Fatigue, unspecified type  - TSH with free T4 reflex; Future  - Vitamin B12; Future  - Vitamin D Deficiency; Future    2. Anxiety  - TSH with free T4 reflex; Future  - escitalopram (LEXAPRO) 10 MG tablet; Take 1 tablet (10 mg) by mouth daily  Dispense: 30 tablet; Refill: 1    3. Screening for prostate cancer  - PSA, screen; Future    4. Memory change  - TSH with free T4 reflex; Future  - Vitamin B12; Future  - Vitamin D Deficiency; Future    5. Routine general medical examination at a health care facility  - Lipid Profile; Future    6. Chronic midline low back pain, with sciatica presence unspecified  - cyclobenzaprine (FLEXERIL) 10 MG tablet; Take 1 tablet (10 mg) by mouth nightly as needed for muscle spasms  Dispense: 30 tablet; Refill: 5        Laury Trivedi NP  [facility]

## 2018-01-15 NOTE — MR AVS SNAPSHOT
After Visit Summary   1/15/2018    Asif Leon    MRN: [id number]           Patient Information     Date Of Birth          1965        Visit Information        Provider Department      1/15/2018 3:30 PM Laury Trivedi NP [facility]        Today's Diagnoses     Fatigue, unspecified type    -  1    Anxiety        Screening for prostate cancer        Memory change        Routine general medical examination at a health care facility        Chronic midline low back pain, with sciatica presence unspecified          Care Instructions        ASSESSMENT/PLAN:     1. Fatigue, unspecified type  - TSH with free T4 reflex; Future  - Vitamin B12; Future  - Vitamin D Deficiency; Future    2. Anxiety  - TSH with free T4 reflex; Future  - escitalopram (LEXAPRO) 10 MG tablet; Take 1 tablet (10 mg) by mouth daily  Dispense: 30 tablet; Refill: 1    3. Screening for prostate cancer  - PSA, screen; Future    4. Memory change  - TSH with free T4 reflex; Future  - Vitamin B12; Future  - Vitamin D Deficiency; Future    5. Routine general medical examination at a health care facility  - Lipid Profile; Future    6. Chronic midline low back pain, with sciatica presence unspecified  - cyclobenzaprine (FLEXERIL) 10 MG tablet; Take 1 tablet (10 mg) by mouth nightly as needed for muscle spasms  Dispense: 30 tablet; Refill: 5        Laury Trivedi NP  [facility]            Follow-ups after your visit        Future tests that were ordered for you today     Open Future Orders        Priority Expected Expires Ordered    PSA, screen Routine  1/15/2019 1/15/2018    Lipid Profile Routine  1/15/2019 1/15/2018    Vitamin B12 Routine  1/15/2019 1/15/2018    Vitamin D Deficiency Routine  1/15/2019 1/15/2018    TSH with free T4 reflex Routine  1/15/2019 1/15/2018            Who to contact     If you have questions or need follow up information about today's clinic visit or your schedule please contact [place]  [facility] directly at [phone].  Normal or non-critical lab and imaging results will be communicated to you by MyChart, letter or phone within 4 business days after the clinic has received the results. If you do not hear from us within 7 days, please contact the clinic through MyChart or phone. If you have a critical or abnormal lab result, we will notify you by phone as soon as possible.  Submit refill requests through [email or web address] or call your pharmacy and they will forward the refill request to us. Please allow 3 business days for your refill to be completed.          Additional Information About Your Visit        MapflowharSakhr Software Information     [email or web address] lets you send messages to your doctor, view your test results, renew your prescriptions, schedule appointments and more. To sign up, go to [email or web address] . Click on \"Log in\" on the left side of the screen, which will take you to the Welcome page. Then click on \"Sign up Now\" on the right side of the page.     You will be asked to enter the access code listed below, as well as some personal information. Please follow the directions to create your username and password.     Your access code is: [email or web address]  Expires: 4/15/2018  4:43 PM     Your access code will  in 90 days. If you need help or a new code, please call your [place] clinic or [phone].        Care EveryWhere ID     This is your Care EveryWhere ID. This could be used by other organizations to access your [place] medical records  [id number]        Your Vitals Were     Pulse Temperature Respirations Height BMI (Body Mass Index)       68 97.8  F (36.6  C) 14 5' 10\" (1.778 m) 30.56 kg/m2        Blood Pressure from Last 3 Encounters:   01/15/18 120/70   17 124/78   17 139/97    Weight from Last 3 Encounters:   01/15/18 213 lb (96.6 kg)   17 206 lb (93.4 kg)   17 210 lb (95.3 kg)                 Today's Medication Changes          These changes are accurate as of: " 1/15/18  4:43 PM.  If you have any questions, ask your nurse or doctor.               Start taking these medicines.        Dose/Directions    cyclobenzaprine 10 MG tablet   Commonly known as:  FLEXERIL   Used for:  Chronic midline low back pain, with sciatica presence unspecified   Started by:  Laury Trivedi NP        Dose:  10 mg   Take 1 tablet (10 mg) by mouth nightly as needed for muscle spasms   Quantity:  30 tablet   Refills:  5       escitalopram 10 MG tablet   Commonly known as:  LEXAPRO   Used for:  Anxiety   Started by:  Laury Trivedi NP        Dose:  10 mg   Take 1 tablet (10 mg) by mouth daily   Quantity:  30 tablet   Refills:  1         Stop taking these medicines if you haven't already. Please contact your care team if you have questions.     albuterol 108 (90 BASE) MCG/ACT Inhaler   Commonly known as:  PROAIR HFA/PROVENTIL HFA/VENTOLIN HFA   Stopped by:  Laury Trivedi NP           azithromycin 250 MG tablet   Commonly known as:  ZITHROMAX   Stopped by:  Laury Trivedi NP           fluticasone-salmeterol 100-50 MCG/DOSE diskus inhaler   Commonly known as:  ADVAIR   Stopped by:  Laury Trivedi NP                Where to get your medicines      These medications were sent to [place] [address] [place] [address] [address]     Phone:  [phone]     cyclobenzaprine 10 MG tablet    escitalopram 10 MG tablet                Primary Care Provider Office Phone # Fax #    Laury Trivedi [phone] [address]  [place] [address]        Equal Access to Services     [facility]: Hadii aad ku hadasho Sonathaly, waaxda luqadaha, qaybta kaalmada adeegyada, salinas green. So [facility] [phone].    ATENCIÓN: Si habla español, tiene a porter disposición servicios gratuitos de asistencia lingüística. Llame al [phone].    We comply with applicable federal civil rights laws and [place] laws. We do not discriminate  on the basis of race, color, national origin, age, disability, sex, sexual orientation, or gender identity.            Thank you!     Thank you for choosing [facility]  for your care. Our goal is always to provide you with excellent care. Hearing back from our patients is one way we can continue to improve our services. Please take a few minutes to complete the written survey that you may receive in the mail after your visit with us. Thank you!             Your Updated Medication List - Protect others around you: Learn how to safely use, store and throw away your medicines at www.disposemymeds.org.          This list is accurate as of: 1/15/18  4:43 PM.  Always use your most recent med list.                   Brand Name Dispense Instructions for use Diagnosis    cyclobenzaprine 10 MG tablet    FLEXERIL    30 tablet    Take 1 tablet (10 mg) by mouth nightly as needed for muscle spasms    Chronic midline low back pain, with sciatica presence unspecified       escitalopram 10 MG tablet    LEXAPRO    30 tablet    Take 1 tablet (10 mg) by mouth daily    Anxiety

## 2018-01-16 DIAGNOSIS — F41.9 ANXIETY: ICD-10-CM

## 2018-01-16 DIAGNOSIS — Z00.00 ROUTINE GENERAL MEDICAL EXAMINATION AT A HEALTH CARE FACILITY: ICD-10-CM

## 2018-01-16 DIAGNOSIS — E55.9 VITAMIN D DEFICIENCY: Primary | ICD-10-CM

## 2018-01-16 DIAGNOSIS — Z12.5 SCREENING FOR PROSTATE CANCER: ICD-10-CM

## 2018-01-16 DIAGNOSIS — R41.3 MEMORY CHANGE: ICD-10-CM

## 2018-01-16 DIAGNOSIS — R53.83 FATIGUE, UNSPECIFIED TYPE: ICD-10-CM

## 2018-01-16 LAB
CHOLEST SERPL-MCNC: 183 MG/DL
HDLC SERPL-MCNC: 43 MG/DL
LDLC SERPL CALC-MCNC: 93 MG/DL
NONHDLC SERPL-MCNC: 140 MG/DL
PSA SERPL-ACNC: 1.64 UG/L (ref 0–4)
TRIGL SERPL-MCNC: 233 MG/DL
TSH SERPL DL<=0.005 MIU/L-ACNC: 2.8 MU/L (ref 0.4–4)
VIT B12 SERPL-MCNC: 461 PG/ML (ref 193–986)

## 2018-01-16 PROCEDURE — 82306 VITAMIN D 25 HYDROXY: CPT | Mod: 90 | Performed by: NURSE PRACTITIONER

## 2018-01-16 PROCEDURE — 99000 SPECIMEN HANDLING OFFICE-LAB: CPT | Performed by: NURSE PRACTITIONER

## 2018-01-16 PROCEDURE — 80061 LIPID PANEL: CPT | Performed by: NURSE PRACTITIONER

## 2018-01-16 PROCEDURE — G0103 PSA SCREENING: HCPCS | Performed by: NURSE PRACTITIONER

## 2018-01-16 PROCEDURE — 82607 VITAMIN B-12: CPT | Mod: 90 | Performed by: NURSE PRACTITIONER

## 2018-01-16 PROCEDURE — 84443 ASSAY THYROID STIM HORMONE: CPT | Performed by: NURSE PRACTITIONER

## 2018-01-16 PROCEDURE — 36415 COLL VENOUS BLD VENIPUNCTURE: CPT | Performed by: NURSE PRACTITIONER

## 2018-01-16 ASSESSMENT — ANXIETY QUESTIONNAIRES: GAD7 TOTAL SCORE: 12

## 2018-01-17 LAB — DEPRECATED CALCIDIOL+CALCIFEROL SERPL-MC: 19 UG/L (ref 20–75)

## 2018-01-17 NOTE — PROGRESS NOTES
Vitamin D deficiency    High dose D 3 times weekly for 3 mos - then DC  D3 5000 U daily now and ongoing    Laury Trivedi [facility]  [phone]

## 2018-02-05 ENCOUNTER — OFFICE VISIT (OUTPATIENT)
Dept: FAMILY MEDICINE | Facility: OTHER | Age: 53
End: 2018-02-05
Attending: NURSE PRACTITIONER
Payer: COMMERCIAL

## 2018-02-05 VITALS
DIASTOLIC BLOOD PRESSURE: 60 MMHG | BODY MASS INDEX: 29.99 KG/M2 | SYSTOLIC BLOOD PRESSURE: 110 MMHG | HEART RATE: 60 BPM | RESPIRATION RATE: 14 BRPM | WEIGHT: 209 LBS

## 2018-02-05 DIAGNOSIS — F51.01 PRIMARY INSOMNIA: Primary | ICD-10-CM

## 2018-02-05 DIAGNOSIS — F41.9 ANXIETY: ICD-10-CM

## 2018-02-05 PROCEDURE — 99213 OFFICE O/P EST LOW 20 MIN: CPT | Performed by: NURSE PRACTITIONER

## 2018-02-05 RX ORDER — ESCITALOPRAM OXALATE 20 MG/1
TABLET ORAL
Qty: 30 TABLET | Refills: 3 | Status: SHIPPED | OUTPATIENT
Start: 2018-02-05 | End: 2018-04-16

## 2018-02-05 RX ORDER — TRAZODONE HYDROCHLORIDE 50 MG/1
50 TABLET, FILM COATED ORAL
Qty: 30 TABLET | Refills: 5 | Status: SHIPPED | OUTPATIENT
Start: 2018-02-05 | End: 2018-04-25

## 2018-02-05 ASSESSMENT — ANXIETY QUESTIONNAIRES
3. WORRYING TOO MUCH ABOUT DIFFERENT THINGS: SEVERAL DAYS
2. NOT BEING ABLE TO STOP OR CONTROL WORRYING: SEVERAL DAYS
GAD7 TOTAL SCORE: 6
1. FEELING NERVOUS, ANXIOUS, OR ON EDGE: SEVERAL DAYS
4. TROUBLE RELAXING: SEVERAL DAYS
6. BECOMING EASILY ANNOYED OR IRRITABLE: SEVERAL DAYS
IF YOU CHECKED OFF ANY PROBLEMS ON THIS QUESTIONNAIRE, HOW DIFFICULT HAVE THESE PROBLEMS MADE IT FOR YOU TO DO YOUR WORK, TAKE CARE OF THINGS AT HOME, OR GET ALONG WITH OTHER PEOPLE: SOMEWHAT DIFFICULT
5. BEING SO RESTLESS THAT IT IS HARD TO SIT STILL: NOT AT ALL
7. FEELING AFRAID AS IF SOMETHING AWFUL MIGHT HAPPEN: SEVERAL DAYS

## 2018-02-05 NOTE — NURSING NOTE
"Chief Complaint   Patient presents with     Anxiety       Initial /60 (BP Location: Right arm, Patient Position: Sitting, Cuff Size: Adult Large)  Pulse 60  Resp 14  Wt 209 lb (94.8 kg)  BMI 29.99 kg/m2 Estimated body mass index is 29.99 kg/(m^2) as calculated from the following:    Height as of 1/15/18: 5' 10\" (1.778 m).    Weight as of this encounter: 209 lb (94.8 kg).  Medication Reconciliation: complete       Amrita Powell      "

## 2018-02-05 NOTE — MR AVS SNAPSHOT
"              After Visit Summary   2/5/2018    Asif Leon    MRN: [id number]           Patient Information     Date Of Birth          1965        Visit Information        Provider Department      2/5/2018 3:45 PM Laury Trivedi NP [facility]        Today's Diagnoses     Primary insomnia    -  1    Anxiety          Care Instructions        ASSESSMENT/PLAN:     1. Anxiety  - escitalopram (LEXAPRO) 20 MG tablet; 1 po qd  Dispense: 30 tablet; Refill: 3      2. Primary insomnia  - traZODone (DESYREL) 50 MG tablet; Take 1 tablet (50 mg) by mouth nightly as needed for sleep  Dispense: 30 tablet; Refill: 5      Laury Trivedi NP  [facility]            Follow-ups after your visit        Who to contact     If you have questions or need follow up information about today's clinic visit or your schedule please contact [facility] directly at [phone].  Normal or non-critical lab and imaging results will be communicated to you by Promobuckethart, letter or phone within 4 business days after the clinic has received the results. If you do not hear from us within 7 days, please contact the clinic through Promobuckethart or phone. If you have a critical or abnormal lab result, we will notify you by phone as soon as possible.  Submit refill requests through Squawka or call your pharmacy and they will forward the refill request to us. Please allow 3 business days for your refill to be completed.          Additional Information About Your Visit        PromobucketharLinPrim Information     Squawka lets you send messages to your doctor, view your test results, renew your prescriptions, schedule appointments and more. To sign up, go to [email or web address] . Click on \"Log in\" on the left side of the screen, which will take you to the Welcome page. Then click on \"Sign up Now\" on the right side of the page.     You will be asked to enter the access code listed below, as well as some personal information. Please " follow the directions to create your username and password.     Your access code is: [email or web address]  Expires: 4/15/2018  4:43 PM     Your access code will  in 90 days. If you need help or a new code, please call your [place] clinic or [phone].        Care EveryWhere ID     This is your Care EveryWhere ID. This could be used by other organizations to access your [place] medical records  [id number]        Your Vitals Were     Pulse Respirations BMI (Body Mass Index)             60 14 29.99 kg/m2          Blood Pressure from Last 3 Encounters:   18 110/60   01/15/18 120/70   17 124/78    Weight from Last 3 Encounters:   18 209 lb (94.8 kg)   01/15/18 213 lb (96.6 kg)   17 206 lb (93.4 kg)              Today, you had the following     No orders found for display         Today's Medication Changes          These changes are accurate as of 18  3:59 PM.  If you have any questions, ask your nurse or doctor.               Start taking these medicines.        Dose/Directions    traZODone 50 MG tablet   Commonly known as:  DESYREL   Used for:  Primary insomnia   Started by:  Laury Trivedi NP        Dose:  50 mg   Take 1 tablet (50 mg) by mouth nightly as needed for sleep   Quantity:  30 tablet   Refills:  5         These medicines have changed or have updated prescriptions.        Dose/Directions    escitalopram 20 MG tablet   Commonly known as:  LEXAPRO   This may have changed:    - medication strength  - how much to take  - how to take this  - when to take this  - additional instructions   Used for:  Anxiety   Changed by:  Laury Trivedi NP        1 po qd   Quantity:  30 tablet   Refills:  3            Where to get your medicines      These medications were sent to [place] [address] [place] [address] [address]     Phone:  [phone]     escitalopram 20 MG tablet    traZODone 50 MG tablet                Primary Care Provider Office Phone #  Fax #    Laury TrivediGABRIEL [phone] [address]  [place] [address]        Equal Access to Services     SONALI ALLEN : Hadii aad ku hadenido Sorandyali, waaxda luqadaha, qaybta kaalmada adeannette, salinas betsyin hayaakory cuevasaddi tapia leroy green. So [facility] [phone].    ATENCIÓN: Si habla español, tiene a porter disposición servicios gratuitos de asistencia lingüística. Llame al [phone].    We comply with applicable federal civil rights laws and [place] laws. We do not discriminate on the basis of race, color, national origin, age, disability, sex, sexual orientation, or gender identity.            Thank you!     Thank you for choosing [facility]  for your care. Our goal is always to provide you with excellent care. Hearing back from our patients is one way we can continue to improve our services. Please take a few minutes to complete the written survey that you may receive in the mail after your visit with us. Thank you!             Your Updated Medication List - Protect others around you: Learn how to safely use, store and throw away your medicines at www.disposemymeds.org.          This list is accurate as of 2/5/18  3:59 PM.  Always use your most recent med list.                   Brand Name Dispense Instructions for use Diagnosis    * VITAMIN D (CHOLECALCIFEROL) PO      Take 5,000 Units by mouth daily        * cholecalciferol 54622 UNITS capsule    VITAMIN D3    36 capsule    1 po 3 times weekly for 3 months then DC    Vitamin D deficiency       cyclobenzaprine 10 MG tablet    FLEXERIL    30 tablet    Take 1 tablet (10 mg) by mouth nightly as needed for muscle spasms    Chronic midline low back pain, with sciatica presence unspecified       escitalopram 20 MG tablet    LEXAPRO    30 tablet    1 po qd    Anxiety       traZODone 50 MG tablet    DESYREL    30 tablet    Take 1 tablet (50 mg) by mouth nightly as needed for sleep    Primary insomnia       * Notice:  This  list has 2 medication(s) that are the same as other medications prescribed for you. Read the directions carefully, and ask your doctor or other care provider to review them with you.

## 2018-02-05 NOTE — PATIENT INSTRUCTIONS
ASSESSMENT/PLAN:     1. Anxiety  - escitalopram (LEXAPRO) 20 MG tablet; 1 po qd  Dispense: 30 tablet; Refill: 3      2. Primary insomnia  - traZODone (DESYREL) 50 MG tablet; Take 1 tablet (50 mg) by mouth nightly as needed for sleep  Dispense: 30 tablet; Refill: 5      Laury Trivedi NP  [facility]

## 2018-02-05 NOTE — PROGRESS NOTES
SUBJECTIVE:   Asif Leon is a 52 year old male who presents to clinic today for the following health issues:      Anxiety Follow-Up  started Lexapro    Status since last visit: Improved slightly    Other associated symptoms:None    Complicating factors:   Significant life event: No   Current substance abuse: None  Depression symptoms: No      GIOVANY-7 SCORE 8/22/2017 1/15/2018 2/5/2018   Total Score 11 12 6         SLEEP -  Was also an issue, still not sleeping      GIOVANY-7        Amount of exercise or physical activity: was doing daily, no walks a lot at work    Problems taking medications regularly: No    Medication side effects: none    Diet: regular (no restrictions)        Problem list and histories reviewed & adjusted, as indicated.  Additional history: as documented    Patient Active Problem List   Diagnosis     Anxiety     Chronic midline low back pain without sciatica     Primary insomnia     Past Surgical History:   Procedure Laterality Date     COLONOSCOPY  2011    f/u polyectomy/ family HX crohns     colonoscopy with polypectomy  1999    stomache pain/family hx     ORTHOPEDIC SURGERY      shoulder surgery     RECESSION RESECTION (REPAIR STRABISMUS) BILATERAL  10/18/2011    Procedure:RECESSION RESECTION (REPAIR STRABISMUS) BILATERAL; Strabismus Repair Right; Surgeon:MERCEDEZ CHAUHAN; Location: OR       Social History   Substance Use Topics     Smoking status: Never Smoker     Smokeless tobacco: Never Used     Alcohol use Yes      Comment: occasional     Family History   Problem Relation Age of Onset     CANCER Mother      Brain - Cause of Death     Crohn Disease Father      Cause of Death     Crohn Disease Sister          Current Outpatient Prescriptions   Medication Sig Dispense Refill     VITAMIN D, CHOLECALCIFEROL, PO Take 5,000 Units by mouth daily       cholecalciferol (VITAMIN D3) 82790 UNITS capsule 1 po 3 times weekly for 3 months then DC 36 capsule 0     escitalopram (LEXAPRO) 10 MG tablet  Take 1 tablet (10 mg) by mouth daily 30 tablet 1     cyclobenzaprine (FLEXERIL) 10 MG tablet Take 1 tablet (10 mg) by mouth nightly as needed for muscle spasms 30 tablet 5     Allergies   Allergen Reactions     No Clinical Screening - See Comments      Seasonal allergies     Recent Labs   Lab Test  01/16/18   0730  12/17/14   0902   LDL  93   --    HDL  43   --    TRIG  233*   --    TSH  2.80  2.90      BP Readings from Last 3 Encounters:   02/05/18 110/60   01/15/18 120/70   08/22/17 124/78    Wt Readings from Last 3 Encounters:   02/05/18 209 lb (94.8 kg)   01/15/18 213 lb (96.6 kg)   08/22/17 206 lb (93.4 kg)                  Labs reviewed in EPIC    Reviewed and updated as needed this visit by clinical staff  Tobacco  Allergies  Meds  Med Hx  Surg Hx  Fam Hx  Soc Hx      Reviewed and updated as needed this visit by Provider         ROS:  Constitutional, HEENT, cardiovascular, pulmonary, gi and gu systems are negative, except as otherwise noted.    OBJECTIVE:     /60 (BP Location: Right arm, Patient Position: Sitting, Cuff Size: Adult Large)  Pulse 60  Resp 14  Wt 209 lb (94.8 kg)  BMI 29.99 kg/m2  Body mass index is 29.99 kg/(m^2).     GENERAL: healthy, alert and no distress  EYES: Eyes grossly normal to inspection, PERRL and conjunctivae and sclerae normal  HENT: ear canals and TM's normal, nose and mouth without ulcers or lesions  NECK: no adenopathy, no asymmetry, masses, or scars and thyroid normal to palpation  RESP: lungs clear to auscultation - no rales, rhonchi or wheezes  CV: regular rate and rhythm, normal S1 S2, no S3 or S4, no murmur, click or rub, no peripheral edema and peripheral pulses strong  SKIN: no suspicious lesions or rashes  PSYCH: Mood overall neutral, improved from last but still a bit flat        ASSESSMENT/PLAN:     1. Anxiety  - escitalopram (LEXAPRO) 20 MG tablet; 1 po qd  Dispense: 30 tablet; Refill: 3      2. Primary insomnia  - traZODone (DESYREL) 50 MG tablet;  Take 1 tablet (50 mg) by mouth nightly as needed for sleep  Dispense: 30 tablet; Refill: 5      Laury Trivedi NP  [facility]

## 2018-02-06 ASSESSMENT — ANXIETY QUESTIONNAIRES: GAD7 TOTAL SCORE: 6

## 2018-02-06 ASSESSMENT — PATIENT HEALTH QUESTIONNAIRE - PHQ9: SUM OF ALL RESPONSES TO PHQ QUESTIONS 1-9: 5

## 2018-03-28 ENCOUNTER — TELEPHONE (OUTPATIENT)
Dept: FAMILY MEDICINE | Facility: OTHER | Age: 53
End: 2018-03-28

## 2018-03-28 NOTE — TELEPHONE ENCOUNTER
Patient called and wants to know if he should recheck Vit.D level?He has taken the 3 months of high dose.Thank you

## 2018-03-30 NOTE — TELEPHONE ENCOUNTER
I do not typically recheck a level after high dose is completed.  Take the OTC 5000 U dose, we can recheck level in the Fall.    Thank you    Laury RICHARDS  [phone]

## 2018-04-16 ENCOUNTER — TELEPHONE (OUTPATIENT)
Dept: FAMILY MEDICINE | Facility: OTHER | Age: 53
End: 2018-04-16

## 2018-04-16 DIAGNOSIS — F41.9 ANXIETY: ICD-10-CM

## 2018-04-16 RX ORDER — ESCITALOPRAM OXALATE 20 MG/1
TABLET ORAL
Qty: 90 TABLET | Refills: 1 | COMMUNITY
Start: 2018-04-16 | End: 2018-04-20

## 2018-04-16 NOTE — TELEPHONE ENCOUNTER
To: Laury Trivedi nurse   Please call patient back to discuss his blood work/medications.  The phone number is [phone].  Thank you          Pt scheduled appt for depression follow-up 4-25-18.

## 2018-04-20 DIAGNOSIS — F41.9 ANXIETY: ICD-10-CM

## 2018-04-20 RX ORDER — ESCITALOPRAM OXALATE 20 MG/1
TABLET ORAL
Qty: 90 TABLET | Refills: 1 | Status: SHIPPED | OUTPATIENT
Start: 2018-04-20 | End: 2019-02-11

## 2018-04-23 DIAGNOSIS — Z00.00 HEALTHCARE MAINTENANCE: Primary | ICD-10-CM

## 2018-04-23 RX ORDER — ERGOCALCIFEROL 1.25 MG/1
50000 CAPSULE, LIQUID FILLED ORAL
Qty: 8 CAPSULE | Refills: 0
Start: 2018-04-23 | End: 2018-06-12

## 2018-04-23 NOTE — TELEPHONE ENCOUNTER
Vitamin D - not ordered in this facility, pt reported      Last Written Prescription Date:    Last Fill Quantity: ,   # refills:   Last Office Visit: 2/5/18  Future Office visit:    Next 5 appointments (look out 90 days)     Apr 25, 2018  8:30 AM CDT   (Arrive by 8:15 AM)   SHORT with Laury Trivedi NP   [facility] )    [address]  [facility] [address]   [phone]                   Routing refill request to provider for review/approval because:  PCP to advise on refill.       Left message for pt to call and let us know if he  Requested a refill or if it was an error sent by pharmacy, only should be on big dose for 3 months.

## 2018-04-25 ENCOUNTER — OFFICE VISIT (OUTPATIENT)
Dept: FAMILY MEDICINE | Facility: OTHER | Age: 53
End: 2018-04-25
Attending: NURSE PRACTITIONER
Payer: COMMERCIAL

## 2018-04-25 VITALS
RESPIRATION RATE: 14 BRPM | BODY MASS INDEX: 30.56 KG/M2 | HEART RATE: 60 BPM | DIASTOLIC BLOOD PRESSURE: 78 MMHG | SYSTOLIC BLOOD PRESSURE: 118 MMHG | WEIGHT: 213 LBS

## 2018-04-25 DIAGNOSIS — E55.9 VITAMIN D DEFICIENCY: ICD-10-CM

## 2018-04-25 DIAGNOSIS — F41.9 ANXIETY: ICD-10-CM

## 2018-04-25 DIAGNOSIS — F51.01 PRIMARY INSOMNIA: Primary | ICD-10-CM

## 2018-04-25 PROCEDURE — 99214 OFFICE O/P EST MOD 30 MIN: CPT | Performed by: NURSE PRACTITIONER

## 2018-04-25 RX ORDER — TEMAZEPAM 15 MG/1
CAPSULE ORAL
Qty: 30 CAPSULE | Refills: 5 | Status: SHIPPED | OUTPATIENT
Start: 2018-04-25 | End: 2018-04-27 | Stop reason: DRUGHIGH

## 2018-04-25 ASSESSMENT — ANXIETY QUESTIONNAIRES
2. NOT BEING ABLE TO STOP OR CONTROL WORRYING: SEVERAL DAYS
GAD7 TOTAL SCORE: 10
6. BECOMING EASILY ANNOYED OR IRRITABLE: MORE THAN HALF THE DAYS
5. BEING SO RESTLESS THAT IT IS HARD TO SIT STILL: SEVERAL DAYS
3. WORRYING TOO MUCH ABOUT DIFFERENT THINGS: MORE THAN HALF THE DAYS
4. TROUBLE RELAXING: MORE THAN HALF THE DAYS
7. FEELING AFRAID AS IF SOMETHING AWFUL MIGHT HAPPEN: SEVERAL DAYS
IF YOU CHECKED OFF ANY PROBLEMS ON THIS QUESTIONNAIRE, HOW DIFFICULT HAVE THESE PROBLEMS MADE IT FOR YOU TO DO YOUR WORK, TAKE CARE OF THINGS AT HOME, OR GET ALONG WITH OTHER PEOPLE: SOMEWHAT DIFFICULT
1. FEELING NERVOUS, ANXIOUS, OR ON EDGE: SEVERAL DAYS

## 2018-04-25 ASSESSMENT — PAIN SCALES - GENERAL: PAINLEVEL: NO PAIN (0)

## 2018-04-25 NOTE — NURSING NOTE
"Chief Complaint   Patient presents with     Depression     with anxiety       Initial /80 (BP Location: Left arm, Patient Position: Sitting, Cuff Size: Adult Large)  Pulse 60  Resp 14  Wt 213 lb (96.6 kg)  BMI 30.56 kg/m2 Estimated body mass index is 30.56 kg/(m^2) as calculated from the following:    Height as of 1/15/18: 5' 10\" (1.778 m).    Weight as of this encounter: 213 lb (96.6 kg).  Medication Reconciliation: complete       Amrita Powell      "

## 2018-04-25 NOTE — PATIENT INSTRUCTIONS
ASSESSMENT/PLAN:     1. Anxiety  - Continue medication as direcetd  - PSYCHOLOGY REFERRAL - [facility]    2. Primary insomnia  - temazepam (RESTORIL) 15 MG capsule; 1/2 - 1 po HS PRN  Dispense: 30 capsule; Refill: 5    3. Vitamin D deficiency  - Continue OTC D3    FU 3 months    Laury Trivedi NP  [facility]

## 2018-04-25 NOTE — MR AVS SNAPSHOT
After Visit Summary   4/25/2018    Asif Leon    MRN: [id number]           Patient Information     Date Of Birth          1965        Visit Information        Provider Department      4/25/2018 8:30 AM Laury Trivedi NP [facility] (formerly [facility])        Today's Diagnoses     Primary insomnia    -  1    Anxiety        Vitamin D deficiency          Care Instructions        ASSESSMENT/PLAN:     1. Anxiety  - Continue medication as direcetd  - PSYCHOLOGY REFERRAL - [facility]    2. Primary insomnia  - temazepam (RESTORIL) 15 MG capsule; 1/2 - 1 po HS PRN  Dispense: 30 capsule; Refill: 5    3. Vitamin D deficiency  - Continue OTC D3    FU 3 months    Laury Trivedi NP  [facility]          Follow-ups after your visit        Additional Services     PSYCHOLOGY REFERRAL       Your provider has referred you to:  Psychology referral    Please be aware that coverage of these services is subject to the terms and limitations of your health insurance plan.  Call member services at your health plan with any benefit or coverage questions.      Please bring the following to your appointment:    >>   Any x-rays, CTs or MRIs which have been performed.  Contact the facility where they were done to arrange for  prior to your scheduled appointment.   >>   List of current medications   >>   This referral request   >>   Any documents/labs given to you for this referral                  Who to contact     If you have questions or need follow up information about today's clinic visit or your schedule please contact [facility] directly at [phone].  Normal or non-critical lab and imaging results will be communicated to you by MyChart, letter or phone within 4 business days after the clinic has received the results. If you do not hear from us within 7 days, please contact the clinic through MyChart or phone. If you have a critical or abnormal lab result, we will  "notify you by phone as soon as possible.  Submit refill requests through [employer] or call your pharmacy and they will forward the refill request to us. Please allow 3 business days for your refill to be completed.          Additional Information About Your Visit        [employer] Information     [employer] lets you send messages to your doctor, view your test results, renew your prescriptions, schedule appointments and more. To sign up, go to [email or web address] . Click on \"Log in\" on the left side of the screen, which will take you to the Welcome page. Then click on \"Sign up Now\" on the right side of the page.     You will be asked to enter the access code listed below, as well as some personal information. Please follow the directions to create your username and password.     Your access code is: [email or web address]  Expires: 2018  9:27 AM     Your access code will  in 90 days. If you need help or a new code, please call your [facility] clinic or [phone].        Care EveryWhere ID     This is your Care EveryWhere ID. This could be used by other organizations to access your [facility] medical records  [id number]        Your Vitals Were     Pulse Respirations BMI (Body Mass Index)             60 14 30.56 kg/m2          Blood Pressure from Last 3 Encounters:   18 118/78   18 110/60   01/15/18 120/70    Weight from Last 3 Encounters:   18 213 lb (96.6 kg)   18 209 lb (94.8 kg)   01/15/18 213 lb (96.6 kg)              We Performed the Following     PSYCHOLOGY REFERRAL          Today's Medication Changes          These changes are accurate as of 18  9:31 AM.  If you have any questions, ask your nurse or doctor.               Start taking these medicines.        Dose/Directions    temazepam 15 MG capsule   Commonly known as:  RESTORIL   Used for:  Primary insomnia   Started by:  Laury Trivedi NP        1/2 - 1 po HS PRN   Quantity:  30 capsule   Refills:  5         These medicines have " changed or have updated prescriptions.        Dose/Directions    VITAMIN D (CHOLECALCIFEROL) PO   This may have changed:  Another medication with the same name was removed. Continue taking this medication, and follow the directions you see here.   Changed by:  Laury Trivedi NP        Dose:  5000 Units   Take 5,000 Units by mouth daily   Refills:  0         Stop taking these medicines if you haven't already. Please contact your care team if you have questions.     traZODone 50 MG tablet   Commonly known as:  DESYREL   Stopped by:  Laury Trivedi NP                Where to get your medicines      Some of these will need a paper prescription and others can be bought over the counter.  Ask your nurse if you have questions.     Bring a paper prescription for each of these medications     temazepam 15 MG capsule                Primary Care Provider Office Phone # Fax #    Laury Trivedi [phone] [address]  [place] [address]        Equal Access to Services     [facility]: Hadii berta henderson hadasho Sorandyali, waaxda luqadaha, qaybta kaalmada adeegyada, waxay milan harper . So [facility] [phone].    ATENCIÓN: Si habla español, tiene a porter disposición servicios gratuitos de asistencia lingüística. Ll[facility] [phone].    We comply with applicable federal civil rights laws and [place] laws. We do not discriminate on the basis of race, color, national origin, age, disability, sex, sexual orientation, or gender identity.            Thank you!     Thank you for choosing [facility]  for your care. Our goal is always to provide you with excellent care. Hearing back from our patients is one way we can continue to improve our services. Please take a few minutes to complete the written survey that you may receive in the mail after your visit with us. Thank you!             Your Updated Medication List - Protect others around you: Learn how to safely use, store  and throw away your medicines at www.disposemymeds.org.          This list is accurate as of 4/25/18  9:31 AM.  Always use your most recent med list.                   Brand Name Dispense Instructions for use Diagnosis    cyclobenzaprine 10 MG tablet    FLEXERIL    30 tablet    Take 1 tablet (10 mg) by mouth nightly as needed for muscle spasms    Chronic midline low back pain, with sciatica presence unspecified       escitalopram 20 MG tablet    LEXAPRO    90 tablet    1 po qd    Anxiety       temazepam 15 MG capsule    RESTORIL    30 capsule    1/2 - 1 po HS PRN    Primary insomnia       VITAMIN D (CHOLECALCIFEROL) PO      Take 5,000 Units by mouth daily

## 2018-04-25 NOTE — PROGRESS NOTES
"  SUBJECTIVE:   Asif Leon is a 53 year old male who presents to clinic today for the following health issues:        Anxiety Follow-Up    Status since last visit: No change    Other associated symptoms:None    Complicating factors:     Significant life event: No     Current substance abuse: None        Father passed away, estate issues with siblings who are going at it over money  Wife had a \"friendship with a man\" outside of their marriage  Sister committed suicide          PHQ-9 1/15/2018 2/5/2018 4/25/2018   Total Score 7 5 9   Q9: Suicide Ideation Not at all Not at all Not at all     GIOVANY-7 SCORE 1/15/2018 2/5/2018 4/25/2018   Total Score 12 6 10         PHQ-9  English  PHQ-9   Any Language  GIOVANY-7  Suicide Assessment Five-step Evaluation and Treatment (SAFE-T)        Insomnia persists - on Trazodone - cannot fall asleep or stay asleep      D deficiency   on OTC, finished high dose D        Amount of exercise or physical activity: active at work    Problems taking medications regularly: No    Medication side effects: none    Diet: regular (no restrictions)        Problem list and histories reviewed & adjusted, as indicated.  Additional history: as documented    Patient Active Problem List   Diagnosis     Anxiety     Chronic midline low back pain without sciatica     Primary insomnia     Vitamin D deficiency     Past Surgical History:   Procedure Laterality Date     COLONOSCOPY  2011    f/u polyectomy/ family HX crohns     colonoscopy with polypectomy  1999    stomache pain/family hx     ORTHOPEDIC SURGERY      shoulder surgery     RECESSION RESECTION (REPAIR STRABISMUS) BILATERAL  10/18/2011    Procedure:RECESSION RESECTION (REPAIR STRABISMUS) BILATERAL; Strabismus Repair Right; Surgeon:MERCEDEZ CHAUHAN; Location: OR       Social History   Substance Use Topics     Smoking status: Never Smoker     Smokeless tobacco: Never Used     Alcohol use Yes      Comment: occasional     Family History   Problem " Relation Age of Onset     CANCER Mother      Brain - Cause of Death     Crohn Disease Father      Cause of Death     Crohn Disease Sister          Current Outpatient Prescriptions   Medication Sig Dispense Refill     cyclobenzaprine (FLEXERIL) 10 MG tablet Take 1 tablet (10 mg) by mouth nightly as needed for muscle spasms 30 tablet 5     escitalopram (LEXAPRO) 20 MG tablet 1 po qd 90 tablet 1     temazepam (RESTORIL) 15 MG capsule 1/2 - 1 po HS PRN 30 capsule 5     VITAMIN D, CHOLECALCIFEROL, PO Take 5,000 Units by mouth daily       Allergies   Allergen Reactions     No Clinical Screening - See Comments      Seasonal allergies     Recent Labs   Lab Test  01/16/18   0730  12/17/14   0902   LDL  93   --    HDL  43   --    TRIG  233*   --    TSH  2.80  2.90      BP Readings from Last 3 Encounters:   04/25/18 118/78   02/05/18 110/60   01/15/18 120/70    Wt Readings from Last 3 Encounters:   04/25/18 213 lb (96.6 kg)   02/05/18 209 lb (94.8 kg)   01/15/18 213 lb (96.6 kg)                  Labs reviewed in EPIC    Reviewed and updated as needed this visit by clinical staff  Tobacco  Allergies  Meds  Med Hx  Surg Hx  Fam Hx  Soc Hx      Reviewed and updated as needed this visit by Provider  Tobacco         ROS:  Constitutional, HEENT, cardiovascular, pulmonary, GI, , musculoskeletal, neuro, skin, endocrine and psych systems are negative, except as otherwise noted.    OBJECTIVE:     /78 (BP Location: Left arm, Patient Position: Sitting, Cuff Size: Adult Large)  Pulse 60  Resp 14  Wt 213 lb (96.6 kg)  BMI 30.56 kg/m2  Body mass index is 30.56 kg/(m^2).     GENERAL: healthy, alert and no distress  EYES: Eyes grossly normal to inspection, PERRL and conjunctivae and sclerae normal  NECK: no adenopathy, no asymmetry, masses, or scars and thyroid normal to palpation  RESP: lungs clear to auscultation - no rales, rhonchi or wheezes  CV: regular rate and rhythm, normal S1 S2, no S3 or S4, no murmur, click or  rub, no peripheral edema and peripheral pulses strong  SKIN: no suspicious lesions or rashes  PSYCH: anxious      Visit time:   Over 40 minutes are spent with the patient.   Greater than 50 % of our visit time was spent face to face and included education and counseling regarding current conditions,  medication management, and plan of care.  We discussed the importance of medication compliance.  Visit Content:   Extensive discussion with patient regarding his anxiety and causative factors.  His family and personal concerns he was moreopen with today  Lab testing discussed and reviewed  Any medication adjustment has been reviewed  Health Maintenance - updated as appropriate.  Record review completed      ASSESSMENT/PLAN:     1. Anxiety  - Continue medication as direcetd  - PSYCHOLOGY REFERRAL - [facility]    2. Primary insomnia  - temazepam (RESTORIL) 15 MG capsule; 1/2 - 1 po HS PRN  Dispense: 30 capsule; Refill: 5    3. Vitamin D deficiency  - Continue OTC D3    FU 3 months    Laury Trivedi NP  [facility]

## 2018-04-25 NOTE — LETTER
My Depression Action Plan  Name: Asif Leon   Date of Birth 1965  Date: 4/25/2018    My doctor: Laury Trivedi   My clinic: [facility]  [address] [place] [address] [address]  [place] [address]  [phone]          GREEN    ZONE   Good Control    What it looks like:     Things are going generally well. You have normal up s and down s. You may even feel depressed from time to time, but bad moods usually last less than a day.   What you need to do:  1. Continue to care for yourself (see self care plan)  2. Check your depression survival kit and update it as needed  3. Follow your physician s recommendations including any medication.  4. Do not stop taking medication unless you consult with your physician first.           YELLOW         ZONE Getting Worse    What it looks like:     Depression is starting to interfere with your life.     It may be hard to get out of bed; you may be starting to isolate yourself from others.    Symptoms of depression are starting to last most all day and this has happened for several days.     You may have suicidal thoughts but they are not constant.   What you need to do:     1. Call your care team, your response to treatment will improve if you keep your care team informed of your progress. Yellow periods are signs an adjustment may need to be made.     2. Continue your self-care, even if you have to fake it!    3. Talk to someone in your support network    4. Open up your depression survival kit           RED    ZONE Medical Alert - Get Help    What it looks like:     Depression is seriously interfering with your life.     You may experience these or other symptoms: You can t get out of bed most days, can t work or engage in other necessary activities, you have trouble taking care of basic hygiene, or basic responsibilities, thoughts of suicide or death that will not go away, self-injurious behavior.     What you need to do:  1. Call your care team and  request a same-day appointment. If they are not available (weekends or after hours) call your local crisis line, emergency room or 911.            Depression Self Care Plan / Survival Kit    Self-Care for Depression  Here s the deal. Your body and mind are really not as separate as most people think.  What you do and think affects how you feel and how you feel influences what you do and think. This means if you do things that people who feel good do, it will help you feel better.  Sometimes this is all it takes.  There is also a place for medication and therapy depending on how severe your depression is, so be sure to consult with your medical provider and/ or Behavioral Health Consultant if your symptoms are worsening or not improving.     In order to better manage my stress, I will:    Exercise  Get some form of exercise, every day. This will help reduce pain and release endorphins, the  feel good  chemicals in your brain. This is almost as good as taking antidepressants!  This is not the same as joining a gym and then never going! (they count on that by the way ) It can be as simple as just going for a walk or doing some gardening, anything that will get you moving.      Hygiene   Maintain good hygiene (Get out of bed in the morning, Make your bed, Brush your teeth, Take a shower, and Get dressed like you were going to work, even if you are unemployed).  If your clothes don't fit try to get ones that do.    Diet  I will strive to eat foods that are good for me, drink plenty of water, and avoid excessive sugar, caffeine, alcohol, and other mood-altering substances.  Some foods that are helpful in depression are: complex carbohydrates, B vitamins, flaxseed, fish or fish oil, fresh fruits and vegetables.    Psychotherapy  I agree to participate in Individual Therapy (if recommended).    Medication  If prescribed medications, I agree to take them.  Missing doses can result in serious side effects.  I understand that  drinking alcohol, or other illicit drug use, may cause potential side effects.  I will not stop my medication abruptly without first discussing it with my provider.    Staying Connected With Others  I will stay in touch with my friends, family members, and my primary care provider/team.    Use your imagination  Be creative.  We all have a creative side; it doesn t matter if it s oil painting, sand castles, or mud pies! This will also kick up the endorphins.    Witness Beauty  (AKA stop and smell the roses) Take a look outside, even in mid-winter. Notice colors, textures. Watch the squirrels and birds.     Service to others  Be of service to others.  There is always someone else in need.  By helping others we can  get out of ourselves  and remember the really important things.  This also provides opportunities for practicing all the other parts of the program.    Humor  Laugh and be silly!  Adjust your TV habits for less news and crime-drama and more comedy.    Control your stress  Try breathing deep, massage therapy, biofeedback, and meditation. Find time to relax each day.     My support system    Clinic Contact:  Phone number:    Contact 1:  Phone number:    Contact 2:  Phone number:    Jainism/:  Phone number:    Therapist:  Phone number:    Local crisis center:    Phone number:    Other community support:  Phone number:

## 2018-04-26 ASSESSMENT — PATIENT HEALTH QUESTIONNAIRE - PHQ9: SUM OF ALL RESPONSES TO PHQ QUESTIONS 1-9: 9

## 2018-04-26 ASSESSMENT — ANXIETY QUESTIONNAIRES: GAD7 TOTAL SCORE: 10

## 2018-04-27 ENCOUNTER — TELEPHONE (OUTPATIENT)
Dept: FAMILY MEDICINE | Facility: OTHER | Age: 53
End: 2018-04-27

## 2018-04-27 DIAGNOSIS — F51.01 PRIMARY INSOMNIA: Primary | ICD-10-CM

## 2018-04-27 RX ORDER — TEMAZEPAM 15 MG/1
CAPSULE ORAL
Qty: 30 CAPSULE | Refills: 5 | Status: SHIPPED | OUTPATIENT
Start: 2018-04-27 | End: 2020-06-22

## 2018-05-07 DIAGNOSIS — E55.9 VITAMIN D DEFICIENCY: Primary | ICD-10-CM

## 2018-05-07 NOTE — TELEPHONE ENCOUNTER
Ghazal, please check on this.  It was to be for 3 months, then maintain on 5000 U daily    Laury Trivedi [facility]  [phone]

## 2018-05-07 NOTE — TELEPHONE ENCOUNTER
Left message for pt to call and let us know if he requested this or if it got kicked out by accident.

## 2018-05-07 NOTE — TELEPHONE ENCOUNTER
Vitamin D 50,000u - patient was prescribed this in January.  Please advise if patient should be taking anymore.  Thank you    Vitamin D Deficiency Screening Results:  Lab Results   Component Value Date    VITDT 19 (L) 01/16/2018

## 2018-11-12 ENCOUNTER — TELEPHONE (OUTPATIENT)
Dept: FAMILY MEDICINE | Facility: OTHER | Age: 53
End: 2018-11-12

## 2018-11-12 ENCOUNTER — TRANSFERRED RECORDS (OUTPATIENT)
Dept: HEALTH INFORMATION MANAGEMENT | Facility: CLINIC | Age: 53
End: 2018-11-12

## 2018-11-12 NOTE — TELEPHONE ENCOUNTER
I need to know his symptoms for the order.  Snoring? Observed apnea?  Daytime tiredness? Etc.    OK to enter order and Ill sign    Last visit 4/2018 - should follow up within the next month or so    Laury RICHARDS  [phone]

## 2018-11-12 NOTE — TELEPHONE ENCOUNTER
Pt calls, requesting sleep study, had 1 in the past and was unable to complete, couldn't sleep.  Wants to try again.

## 2018-11-20 NOTE — TELEPHONE ENCOUNTER
Left another message for pt, has been 6 months since last visit, could come for chronic disease management and get referral for sleep study, or call and let us know what he wants to do.

## 2018-11-28 ENCOUNTER — TRANSFERRED RECORDS (OUTPATIENT)
Dept: HEALTH INFORMATION MANAGEMENT | Facility: CLINIC | Age: 53
End: 2018-11-28

## 2018-12-06 ENCOUNTER — TRANSFERRED RECORDS (OUTPATIENT)
Dept: HEALTH INFORMATION MANAGEMENT | Facility: CLINIC | Age: 53
End: 2018-12-06

## 2018-12-07 ENCOUNTER — TELEPHONE (OUTPATIENT)
Dept: FAMILY MEDICINE | Facility: OTHER | Age: 53
End: 2018-12-07

## 2018-12-07 NOTE — PROGRESS NOTES
SUBJECTIVE:   Asif Leon is a 53 year old male who presents to clinic today for the following health issues:            Insomnia  Onset: years    Description:   Time to fall asleep (sleep latency): 45 minutes  Middle of night awakening:  YES  Early morning awakening:  YES    Progression of Symptoms:  same and constant    Accompanying Signs & Symptoms:  Daytime sleepiness/napping: YES  Excessive snoring/apnea: YES  Restless legs: YES  Frequent urination: no   Chronic pain:  YES- back pain    History:  Prior Insomnia: YES    Precipitating factors:   New stressful situation: YES- work  Caffeine intake: YES  OTC decongestants: no   Any new medications: no     Alleviating factors:  Self medicating (alcohol, etc.):  no  Therapies Tried and outcome: Restoril tired the next day        Observed Sleep Apnea  Nightly, along with daytime tiredness  Awakens himself  Trouble falling asleep        Anxiety Follow-Up    Status since last visit: No change    Other associated symptoms:None    Complicating factors:   Significant life event: No   Current substance abuse: None  Depression symptoms: No    GIOVANY-7 SCORE 2/5/2018 4/25/2018 12/10/2018   Total Score 6 10 5     PHQ-9 SCORE 2/5/2018 4/25/2018 12/10/2018   PHQ-9 Total Score 5 9 9       D deficiency - lab due in January      Problem list and histories reviewed & adjusted, as indicated.  Additional history: as documented        Patient Active Problem List   Diagnosis     Anxiety     Chronic midline low back pain without sciatica     Primary insomnia     Vitamin D deficiency     Observed sleep apnea     Past Surgical History:   Procedure Laterality Date     COLONOSCOPY  2011    f/u polyectomy/ family HX crohns     colonoscopy with polypectomy  1999    stomache pain/family hx     ORTHOPEDIC SURGERY      shoulder surgery     RECESSION RESECTION (REPAIR STRABISMUS) BILATERAL  10/18/2011    Procedure:RECESSION RESECTION (REPAIR STRABISMUS) BILATERAL; Strabismus Repair Right;  "Surgeon:MERCEDEZ CHAUHAN; Location:[facility] OR       Social History     Tobacco Use     Smoking status: Never Smoker     Smokeless tobacco: Never Used   Substance Use Topics     Alcohol use: Yes     Comment: occasional     Family History   Problem Relation Age of Onset     Cancer Mother         Brain - Cause of Death     Crohn's Disease Father         Cause of Death     Crohn's Disease Sister          Current Outpatient Medications   Medication Sig Dispense Refill     cyclobenzaprine (FLEXERIL) 10 MG tablet Take 1 tablet (10 mg) by mouth nightly as needed for muscle spasms 30 tablet 5     escitalopram (LEXAPRO) 20 MG tablet 1 po qd 90 tablet 1     temazepam (RESTORIL) 15 MG capsule 1 po qd 30 capsule 5     VITAMIN D, CHOLECALCIFEROL, PO Take 5,000 Units by mouth daily       Allergies   Allergen Reactions     No Clinical Screening - See Comments      Seasonal allergies     Recent Labs   Lab Test 01/16/18  0730 12/17/14  0902   LDL 93  --    HDL 43  --    TRIG 233*  --    TSH 2.80 2.90      BP Readings from Last 3 Encounters:   12/10/18 116/78   04/25/18 118/78   02/05/18 110/60    Wt Readings from Last 3 Encounters:   12/10/18 96.6 kg (213 lb)   04/25/18 96.6 kg (213 lb)   02/05/18 94.8 kg (209 lb)                  Labs reviewed in EPIC    Reviewed and updated as needed this visit by clinical staff  Tobacco  Allergies  Meds  Med Hx  Surg Hx  Fam Hx  Soc Hx      Reviewed and updated as needed this visit by Provider  Tobacco         ROS:  Constitutional, HEENT, cardiovascular, pulmonary, GI, , musculoskeletal, neuro, skin, endocrine and psych systems are negative, except as otherwise noted.    OBJECTIVE:     /78 (BP Location: Left arm, Patient Position: Sitting, Cuff Size: Adult Large)   Pulse 64   Resp 14   Ht 1.778 m (5' 10\")   Wt 96.6 kg (213 lb)   BMI 30.56 kg/m    Body mass index is 30.56 kg/m .          12/10/18 4/25/18 2/5/18 1/15/18   /78 118/78 110/60 120/70   Pulse 64 60 60 68   Resp 14 14 " "14 14   Height 1.778 m (5' 10\") -- -- 1.778 m (5' 10\")   Weight 96.6 kg (213 lb) 96.6 kg (213 lb) 94.8 kg (209 lb) 96.6 kg (213 lb)   Pain Score 0 (None) 0 (None) -- --   BMI (Calculated) 30.63 -- -- 30.63           GENERAL: healthy, alert and no distress  EYES: Eyes grossly normal to inspection, PERRL and conjunctivae and sclerae normal  HENT: ear canals and TM's normal, nose and mouth without ulcers or lesions  NECK: no adenopathy, no asymmetry, masses, or scars and thyroid normal to palpation  RESP: lungs clear to auscultation - no rales, rhonchi or wheezes  CV: regular rate and rhythm, normal S1 S2, no S3 or S4, no murmur, click or rub, no peripheral edema and peripheral pulses strong  ABDOMEN: soft, nontender, no hepatosplenomegaly, no masses and bowel sounds normal  MS: Bilateral SI joint pain  SKIN: no suspicious lesions or rashes  PSYCH: mentation appears normal, affect normal/bright      Results for orders placed or performed in visit on 12/10/18 (from the past 48 hour(s))   Vitamin D Deficiency   Result Value Ref Range    Vitamin D Deficiency screening 21 20 - 75 ug/L       ASSESSMENT/PLAN:       1. Anxiety  - Lexapro as directed  - Counseling as established  - Consider 5HTP 100 mg     2. Primary insomnia  - Continue plan of care    3. Vitamin D deficiency  - D3 5000 U OTC  - Vitamin D Deficiency    4. Screening for prostate cancer  - PSA after 1/17/18    5. Need for hepatitis C screening test  - Hepatitis C RNA, quantitative    6.  Need for Vaccination  - TDAP VACCINE (ADACEL)  - VACCINE ADMINISTRATION, INITIAL    7.  Observed Sleep Apnea  - Sleep study ordered    8.  History of Colon Polyps  - Will order a General surgery consult      Laury Trivedi NP  [facility]  "

## 2018-12-10 ENCOUNTER — OFFICE VISIT (OUTPATIENT)
Dept: FAMILY MEDICINE | Facility: OTHER | Age: 53
End: 2018-12-10
Attending: NURSE PRACTITIONER
Payer: COMMERCIAL

## 2018-12-10 ENCOUNTER — TELEPHONE (OUTPATIENT)
Dept: SURGERY | Facility: OTHER | Age: 53
End: 2018-12-10

## 2018-12-10 VITALS
SYSTOLIC BLOOD PRESSURE: 116 MMHG | WEIGHT: 213 LBS | HEIGHT: 70 IN | HEART RATE: 64 BPM | RESPIRATION RATE: 14 BRPM | BODY MASS INDEX: 30.49 KG/M2 | DIASTOLIC BLOOD PRESSURE: 78 MMHG

## 2018-12-10 DIAGNOSIS — Z11.59 NEED FOR HEPATITIS C SCREENING TEST: ICD-10-CM

## 2018-12-10 DIAGNOSIS — E55.9 VITAMIN D DEFICIENCY: ICD-10-CM

## 2018-12-10 DIAGNOSIS — Z12.11 SPECIAL SCREENING FOR MALIGNANT NEOPLASMS, COLON: Primary | ICD-10-CM

## 2018-12-10 DIAGNOSIS — Z86.0100 HISTORY OF COLONIC POLYPS: ICD-10-CM

## 2018-12-10 DIAGNOSIS — F51.01 PRIMARY INSOMNIA: ICD-10-CM

## 2018-12-10 DIAGNOSIS — F41.9 ANXIETY: Primary | ICD-10-CM

## 2018-12-10 DIAGNOSIS — M53.3 SACROILIAC JOINT DYSFUNCTION: ICD-10-CM

## 2018-12-10 DIAGNOSIS — Z12.5 SCREENING FOR PROSTATE CANCER: ICD-10-CM

## 2018-12-10 DIAGNOSIS — G47.30 OBSERVED SLEEP APNEA: ICD-10-CM

## 2018-12-10 PROCEDURE — 90715 TDAP VACCINE 7 YRS/> IM: CPT | Performed by: NURSE PRACTITIONER

## 2018-12-10 PROCEDURE — 90471 IMMUNIZATION ADMIN: CPT | Performed by: NURSE PRACTITIONER

## 2018-12-10 PROCEDURE — 36415 COLL VENOUS BLD VENIPUNCTURE: CPT | Performed by: NURSE PRACTITIONER

## 2018-12-10 PROCEDURE — 87522 HEPATITIS C REVRS TRNSCRPJ: CPT | Mod: 90 | Performed by: NURSE PRACTITIONER

## 2018-12-10 PROCEDURE — 99214 OFFICE O/P EST MOD 30 MIN: CPT | Mod: 25 | Performed by: NURSE PRACTITIONER

## 2018-12-10 PROCEDURE — 82306 VITAMIN D 25 HYDROXY: CPT | Mod: 90 | Performed by: NURSE PRACTITIONER

## 2018-12-10 PROCEDURE — 99000 SPECIMEN HANDLING OFFICE-LAB: CPT | Performed by: NURSE PRACTITIONER

## 2018-12-10 ASSESSMENT — ANXIETY QUESTIONNAIRES
3. WORRYING TOO MUCH ABOUT DIFFERENT THINGS: SEVERAL DAYS
IF YOU CHECKED OFF ANY PROBLEMS ON THIS QUESTIONNAIRE, HOW DIFFICULT HAVE THESE PROBLEMS MADE IT FOR YOU TO DO YOUR WORK, TAKE CARE OF THINGS AT HOME, OR GET ALONG WITH OTHER PEOPLE: NOT DIFFICULT AT ALL
2. NOT BEING ABLE TO STOP OR CONTROL WORRYING: SEVERAL DAYS
1. FEELING NERVOUS, ANXIOUS, OR ON EDGE: SEVERAL DAYS
5. BEING SO RESTLESS THAT IT IS HARD TO SIT STILL: NOT AT ALL
GAD7 TOTAL SCORE: 5
6. BECOMING EASILY ANNOYED OR IRRITABLE: SEVERAL DAYS
4. TROUBLE RELAXING: SEVERAL DAYS
7. FEELING AFRAID AS IF SOMETHING AWFUL MIGHT HAPPEN: NOT AT ALL

## 2018-12-10 ASSESSMENT — PATIENT HEALTH QUESTIONNAIRE - PHQ9: SUM OF ALL RESPONSES TO PHQ QUESTIONS 1-9: 9

## 2018-12-10 ASSESSMENT — MIFFLIN-ST. JEOR: SCORE: 1812.41

## 2018-12-10 ASSESSMENT — PAIN SCALES - GENERAL: PAINLEVEL: NO PAIN (0)

## 2018-12-10 NOTE — NURSING NOTE
"Chief Complaint   Patient presents with     Anxiety     Insomnia       Initial /80 (BP Location: Left arm, Patient Position: Sitting, Cuff Size: Adult Large)   Pulse 64   Resp 14   Ht 1.778 m (5' 10\")   Wt 96.6 kg (213 lb)   BMI 30.56 kg/m   Estimated body mass index is 30.56 kg/m  as calculated from the following:    Height as of this encounter: 1.778 m (5' 10\").    Weight as of this encounter: 96.6 kg (213 lb).  Medication Reconciliation: complete    Amrita Powell LPN    "

## 2018-12-10 NOTE — TELEPHONE ENCOUNTER
Patient contacted regarding a referral sent by Laury Trivedi for a meet and greet colonoscopy.  Patient has chosen December 20, 2018 as the date for their meet and greet colonoscopy with Dr Vilchis at [facility].    All information regarding the bowel prep, same day surgery and our [facility] Handbook has been sent to the patient via mail.  Numbers to the surgery department have been provided to the patient in case questions should arise or a date needs to be rescheduled.

## 2018-12-10 NOTE — LETTER
12/13/2018     Asif Leon  [address] [place] [id number]      Dear Asif:    Thank you for allowing me to participate in your care. Your recent test results were reviewed and listed below.      Results for orders placed or performed in visit on 12/10/18   Vitamin D Deficiency   Result Value Ref Range    Vitamin D Deficiency screening 21 20 - 75 ug/L   Hepatitis C RNA, quantitative   Result Value Ref Range    HCV RNA Quant IU/ml HCV RNA Not Detected HCVND^HCV RNA Not Detected [IU]/mL    Log of HCV RNA Qt Not Calculated <1.2 Log IU/mL    visit    Hepatitis C screening was negative/normal.  Vitamin D level was low, should be around 60, your level was 21.  I called in a prescription dose of Vitamin D to the pharmacy.  Take 50,000units 3 times a week for 3 months then discontinue.  Also , take 5,000 units daily, you can buy this over the counter.  Take the 5,000 daily and supplement with big dose 3 times weekly.  After the 3 months, continue with the daily dose.  Please call with questions.      Sincerely,        AAYUSH Mcclelland    [facility]  [address]  [place] [address]  Phone: [phone]

## 2018-12-10 NOTE — PATIENT INSTRUCTIONS
"       12/10/18 4/25/18 2/5/18 1/15/18   /78 118/78 110/60 120/70   Pulse 64 60 60 68   Resp 14 14 14 14   Height 1.778 m (5' 10\") -- -- 1.778 m (5' 10\")   Weight 96.6 kg (213 lb) 96.6 kg (213 lb) 94.8 kg (209 lb) 96.6 kg (213 lb)   Pain Score 0 (None) 0 (None) -- --   BMI (Calculated) 30.63 -- -- 30.63           ASSESSMENT/PLAN:       1. Anxiety  - Lexapro as directed  - Counseling as established  - Consider 5HTP 100 mg     2. Primary insomnia  - Continue plan of care    3. Vitamin D deficiency  - D3 5000 U OTC  - Vitamin D Deficiency    4. Screening for prostate cancer  - PSA after 1/17/18    5. Need for hepatitis C screening test  - Hepatitis C RNA, quantitative    6.  Need for Vaccination  - TDAP VACCINE (ADACEL)  - VACCINE ADMINISTRATION, INITIAL    7.  Observed Sleep Apnea  - Sleep study ordered    8.  History of Colon Polyps  - Will order a General surgery consult      Laury Trivedi NP  [facility] - [place] [facility]    "

## 2018-12-11 LAB — DEPRECATED CALCIDIOL+CALCIFEROL SERPL-MC: 21 UG/L (ref 20–75)

## 2018-12-11 RX ORDER — ERGOCALCIFEROL 1.25 MG/1
CAPSULE, LIQUID FILLED ORAL
Qty: 36 CAPSULE | Refills: 0 | Status: SHIPPED | OUTPATIENT
Start: 2018-12-11 | End: 2019-05-22

## 2018-12-11 RX ORDER — BISACODYL 5 MG/1
5 TABLET, DELAYED RELEASE ORAL ONCE
Qty: 2 TABLET | Refills: 0 | Status: SHIPPED | OUTPATIENT
Start: 2018-12-11 | End: 2019-05-22

## 2018-12-11 RX ORDER — POLYETHYLENE GLYCOL 3350 17 G/17G
1 POWDER, FOR SOLUTION ORAL DAILY
Qty: 1 BOTTLE | Refills: 0 | Status: SHIPPED | OUTPATIENT
Start: 2018-12-11 | End: 2019-05-22

## 2018-12-11 ASSESSMENT — ANXIETY QUESTIONNAIRES: GAD7 TOTAL SCORE: 5

## 2018-12-11 NOTE — RESULT ENCOUNTER NOTE
Very low D level  High dose D sent to pharmacy - 50,000 U 3 times weekly for 3 mos then DC  Also, D3 5000 U OTC daily and ongoing    [facility]  [phone]

## 2018-12-13 LAB
HCV RNA SERPL NAA+PROBE-ACNC: NORMAL [IU]/ML
HCV RNA SERPL NAA+PROBE-LOG IU: NORMAL LOG IU/ML

## 2018-12-19 ENCOUNTER — ANESTHESIA EVENT (OUTPATIENT)
Dept: SURGERY | Facility: HOSPITAL | Age: 53
End: 2018-12-19
Payer: COMMERCIAL

## 2018-12-19 NOTE — ANESTHESIA PREPROCEDURE EVALUATION
Anesthesia Pre-Procedure Evaluation    Patient: Asif Leon   MRN: [id number] : 1965          Preoperative Diagnosis: SCREENING FOR COLON CANCER    Procedure(s):  COLONOSCOPY    Past Medical History:   Diagnosis Date     Anxiety 1/15/2018     Chronic midline low back pain without sciatica 1/15/2018     Depressive disorder      History of colonic polyps 12/10/2018     Insomnia 2012     Observed sleep apnea 12/10/2018     Primary insomnia 1/15/2018     Vitamin D deficiency 2018     Past Surgical History:   Procedure Laterality Date     COLONOSCOPY      f/u polyectomy/ family HX crohns     colonoscopy with polypectomy      stomache pain/family hx     ORTHOPEDIC SURGERY      shoulder surgery     RECESSION RESECTION (REPAIR STRABISMUS) BILATERAL  10/18/2011    Procedure:RECESSION RESECTION (REPAIR STRABISMUS) BILATERAL; Strabismus Repair Right; Surgeon:MERCEDEZ CHAUHAN; Location:[facility]       Anesthesia Evaluation     . Pt has had prior anesthetic. Type: General    No history of anesthetic complications          ROS/MED HX    ENT/Pulmonary:     (+)sleep apnea, , . .    Neurologic:  - neg neurologic ROS     Cardiovascular:  - neg cardiovascular ROS       METS/Exercise Tolerance:     Hematologic:  - neg hematologic  ROS       Musculoskeletal:   (+) , , other musculoskeletal- chronic low back pain      GI/Hepatic:     (+) bowel prep,       Renal/Genitourinary:  - ROS Renal section negative       Endo:     (+) Obesity, .      Psychiatric:     (+) psychiatric history anxiety and depression      Infectious Disease:  - neg infectious disease ROS       Malignancy:      - no malignancy   Other:    - neg other ROS                      Physical Exam  Normal systems: cardiovascular, pulmonary and dental    Airway   Mallampati: III  TM distance: >3 FB  Neck ROM: full    Dental     Cardiovascular   Rhythm and rate: regular and normal      Pulmonary    breath sounds clear to auscultation            Lab  "Results   Component Value Date    WBC 6.7 12/17/2014    HGB 13.6 12/17/2014    HCT 40.1 12/17/2014     (L) 12/17/2014    TSH 2.80 01/16/2018       Preop Vitals  BP Readings from Last 3 Encounters:   12/10/18 116/78   04/25/18 118/78   02/05/18 110/60    Pulse Readings from Last 3 Encounters:   12/10/18 64   04/25/18 60   02/05/18 60      Resp Readings from Last 3 Encounters:   12/10/18 14   04/25/18 14   02/05/18 14    SpO2 Readings from Last 3 Encounters:   08/22/17 96%   08/07/17 97%   10/18/11 97%      Temp Readings from Last 1 Encounters:   01/15/18 97.8  F (36.6  C)    Ht Readings from Last 1 Encounters:   12/10/18 1.778 m (5' 10\")      Wt Readings from Last 1 Encounters:   12/10/18 96.6 kg (213 lb)    Estimated body mass index is 30.56 kg/m  as calculated from the following:    Height as of 12/10/18: 1.778 m (5' 10\").    Weight as of 12/10/18: 96.6 kg (213 lb).       Anesthesia Plan      History & Physical Review  History and physical reviewed and following examination; no interval change.    ASA Status:  3 .    NPO Status:  > 8 hours    Plan for MAC with Intravenous and Propofol induction. Maintenance will be TIVA.  Reason for MAC:  Chronic cardiopulmonary disease (G9) and Other - see comments  PONV prophylaxis:  Ondansetron (or other 5HT-3) and Dexamethasone or Solumedrol  Surgeon requests deep sedation. Patient is an ASA 3. Will provide MAC.      Postoperative Care  Postoperative pain management:  IV analgesics and Oral pain medications.      Consents  Anesthetic plan, risks, benefits and alternatives discussed with:  Patient..                 JEAN PIERRE Sevilla CRNA  "

## 2018-12-20 ENCOUNTER — HOSPITAL ENCOUNTER (OUTPATIENT)
Facility: HOSPITAL | Age: 53
Discharge: HOME | End: 2018-12-20
Attending: SURGERY | Admitting: SURGERY
Payer: COMMERCIAL

## 2018-12-20 ENCOUNTER — ANESTHESIA (OUTPATIENT)
Dept: SURGERY | Facility: HOSPITAL | Age: 53
End: 2018-12-20
Payer: COMMERCIAL

## 2018-12-20 VITALS
DIASTOLIC BLOOD PRESSURE: 92 MMHG | OXYGEN SATURATION: 96 % | SYSTOLIC BLOOD PRESSURE: 126 MMHG | WEIGHT: 208.6 LBS | TEMPERATURE: 97.6 F | RESPIRATION RATE: 18 BRPM | HEART RATE: 70 BPM | HEIGHT: 70 IN | BODY MASS INDEX: 29.86 KG/M2

## 2018-12-20 PROCEDURE — 71000027 ZZH RECOVERY PHASE 2 EACH 15 MINS: Performed by: SURGERY

## 2018-12-20 PROCEDURE — 45385 COLONOSCOPY W/LESION REMOVAL: CPT | Performed by: ANESTHESIOLOGY

## 2018-12-20 PROCEDURE — 25000128 H RX IP 250 OP 636: Performed by: NURSE ANESTHETIST, CERTIFIED REGISTERED

## 2018-12-20 PROCEDURE — 88305 TISSUE EXAM BY PATHOLOGIST: CPT | Mod: TC | Performed by: SURGERY

## 2018-12-20 PROCEDURE — 40000306 ZZH STATISTIC PRE PROC ASSESS II: Performed by: SURGERY

## 2018-12-20 PROCEDURE — 36000052 ZZH SURGERY LEVEL 2 EA 15 ADDTL MIN: Performed by: SURGERY

## 2018-12-20 PROCEDURE — 45380 COLONOSCOPY AND BIOPSY: CPT | Mod: PT | Performed by: SURGERY

## 2018-12-20 PROCEDURE — 37000009 ZZH ANESTHESIA TECHNICAL FEE, EACH ADDTL 15 MIN: Performed by: SURGERY

## 2018-12-20 PROCEDURE — 37000008 ZZH ANESTHESIA TECHNICAL FEE, 1ST 30 MIN: Performed by: SURGERY

## 2018-12-20 PROCEDURE — 36000050 ZZH SURGERY LEVEL 2 1ST 30 MIN: Performed by: SURGERY

## 2018-12-20 PROCEDURE — 27210794 ZZH OR GENERAL SUPPLY STERILE: Performed by: SURGERY

## 2018-12-20 PROCEDURE — 01999 UNLISTED ANES PROCEDURE: CPT | Performed by: NURSE ANESTHETIST, CERTIFIED REGISTERED

## 2018-12-20 RX ORDER — ONDANSETRON 4 MG/1
4 TABLET, ORALLY DISINTEGRATING ORAL EVERY 30 MIN PRN
Status: DISCONTINUED | OUTPATIENT
Start: 2018-12-20 | End: 2018-12-20 | Stop reason: HOSPADM

## 2018-12-20 RX ORDER — NALOXONE HYDROCHLORIDE 0.4 MG/ML
.1-.4 INJECTION, SOLUTION INTRAMUSCULAR; INTRAVENOUS; SUBCUTANEOUS
Status: DISCONTINUED | OUTPATIENT
Start: 2018-12-20 | End: 2018-12-20 | Stop reason: HOSPADM

## 2018-12-20 RX ORDER — SODIUM CHLORIDE, SODIUM LACTATE, POTASSIUM CHLORIDE, CALCIUM CHLORIDE 600; 310; 30; 20 MG/100ML; MG/100ML; MG/100ML; MG/100ML
INJECTION, SOLUTION INTRAVENOUS CONTINUOUS
Status: DISCONTINUED | OUTPATIENT
Start: 2018-12-20 | End: 2018-12-20 | Stop reason: HOSPADM

## 2018-12-20 RX ORDER — ONDANSETRON 2 MG/ML
4 INJECTION INTRAMUSCULAR; INTRAVENOUS EVERY 30 MIN PRN
Status: DISCONTINUED | OUTPATIENT
Start: 2018-12-20 | End: 2018-12-20 | Stop reason: HOSPADM

## 2018-12-20 RX ORDER — LIDOCAINE 40 MG/G
CREAM TOPICAL
Status: DISCONTINUED | OUTPATIENT
Start: 2018-12-20 | End: 2018-12-20 | Stop reason: HOSPADM

## 2018-12-20 RX ORDER — MEPERIDINE HYDROCHLORIDE 50 MG/ML
12.5 INJECTION INTRAMUSCULAR; INTRAVENOUS; SUBCUTANEOUS
Status: DISCONTINUED | OUTPATIENT
Start: 2018-12-20 | End: 2018-12-20 | Stop reason: HOSPADM

## 2018-12-20 RX ORDER — FLUMAZENIL 0.1 MG/ML
0.2 INJECTION, SOLUTION INTRAVENOUS
Status: CANCELLED | OUTPATIENT
Start: 2018-12-20 | End: 2018-12-21

## 2018-12-20 RX ORDER — NALOXONE HYDROCHLORIDE 0.4 MG/ML
.1-.4 INJECTION, SOLUTION INTRAMUSCULAR; INTRAVENOUS; SUBCUTANEOUS
Status: CANCELLED | OUTPATIENT
Start: 2018-12-20 | End: 2018-12-21

## 2018-12-20 RX ORDER — PROPOFOL 10 MG/ML
INJECTION, EMULSION INTRAVENOUS PRN
Status: DISCONTINUED | OUTPATIENT
Start: 2018-12-20 | End: 2018-12-20

## 2018-12-20 RX ADMIN — PROPOFOL 30 MG: 10 INJECTION, EMULSION INTRAVENOUS at 14:22

## 2018-12-20 RX ADMIN — PROPOFOL 30 MG: 10 INJECTION, EMULSION INTRAVENOUS at 14:23

## 2018-12-20 RX ADMIN — PROPOFOL 20 MG: 10 INJECTION, EMULSION INTRAVENOUS at 14:33

## 2018-12-20 RX ADMIN — PROPOFOL 70 MG: 10 INJECTION, EMULSION INTRAVENOUS at 14:20

## 2018-12-20 RX ADMIN — PROPOFOL 50 MG: 10 INJECTION, EMULSION INTRAVENOUS at 14:38

## 2018-12-20 RX ADMIN — PROPOFOL 20 MG: 10 INJECTION, EMULSION INTRAVENOUS at 14:30

## 2018-12-20 RX ADMIN — PROPOFOL 30 MG: 10 INJECTION, EMULSION INTRAVENOUS at 14:21

## 2018-12-20 RX ADMIN — PROPOFOL 20 MG: 10 INJECTION, EMULSION INTRAVENOUS at 14:27

## 2018-12-20 RX ADMIN — PROPOFOL 30 MG: 10 INJECTION, EMULSION INTRAVENOUS at 14:24

## 2018-12-20 RX ADMIN — SODIUM CHLORIDE, POTASSIUM CHLORIDE, SODIUM LACTATE AND CALCIUM CHLORIDE: 600; 310; 30; 20 INJECTION, SOLUTION INTRAVENOUS at 14:05

## 2018-12-20 ASSESSMENT — MIFFLIN-ST. JEOR: SCORE: 1792.45

## 2018-12-20 NOTE — H&P
Surgery Consult Clinic Note      RE: Asif Leon  : 1965        Chief Complaint:  Colon cancer screening  Personal history of colon polyps  Family history of colon cancer in 1st degree relative    History of Present Illness:  I am seeing Asif Leon at the request of Laury Trivedi NP for evaluation regarding meet and greet screening colonoscopy.  He denies blood in stool, changes in bowel habits, weight loss, abdominal pain.  He reports two previous colonoscopies with polyps on the first and the second was clean.  He reports a family history of colon or rectal cancer in a first degree relative and Crohn's disease in two 1st degree relatives.  No previous abdominal surgeries.   He has no questions regarding  bowel prep.  Reports passing clear yellow liquid stools today.  He specifically denies fevers, chills, nausea, vomiting, chest pain, shortness of breath, palpitations, sore throat, cough, or generalized feeling ill.      Medical history:  Past Medical History:   Diagnosis Date     Anxiety 1/15/2018     Chronic midline low back pain without sciatica 1/15/2018     Depressive disorder      History of colonic polyps 12/10/2018     Insomnia 2012     Observed sleep apnea 12/10/2018     Primary insomnia 1/15/2018     Vitamin D deficiency 2018       Surgical history:  Past Surgical History:   Procedure Laterality Date     COLONOSCOPY      f/u polyectomy/ family HX crohns     colonoscopy with polypectomy      stomache pain/family hx     ORTHOPEDIC SURGERY      shoulder surgery     RECESSION RESECTION (REPAIR STRABISMUS) BILATERAL  10/18/2011    Procedure:RECESSION RESECTION (REPAIR STRABISMUS) BILATERAL; Strabismus Repair Right; Surgeon:MERCEDEZ CHAUHAN; Location[facility]       Family history:  Family History   Problem Relation Age of Onset     Cancer Mother         Brain - Cause of Death     Crohn's Disease Father         Cause of Death     Crohn's Disease Sister         Medications:  Prior to Admission medications    Medication Sig Start Date End Date Taking? Authorizing Provider   escitalopram (LEXAPRO) 20 MG tablet 1 po qd 4/20/18  Yes Laury Trivedi NP   polyethylene glycol (MIRALAX/GLYCOLAX) powder Take 17 g (1 capful) by mouth daily 12/11/18  Yes Yared Vilchis MD   VITAMIN D, CHOLECALCIFEROL, PO Take 5,000 Units by mouth daily   Yes Reported, Patient   vitamin D2 (ERGOCALCIFEROL) 49944 units (1250 mcg) capsule 1 po 3 times weekly for three months then DC 12/11/18 3/11/19 Yes Laury Trivedi NP   cyclobenzaprine (FLEXERIL) 10 MG tablet Take 1 tablet (10 mg) by mouth nightly as needed for muscle spasms 1/15/18   Laury Trivedi NP   temazepam (RESTORIL) 15 MG capsule 1 po qd 4/27/18   Laury Trivedi NP       Allergies:  The patient is allergic to no clinical screening - see comments.  .  Social history:  Social History     Tobacco Use     Smoking status: Never Smoker     Smokeless tobacco: Never Used   Substance Use Topics     Alcohol use: Yes     Comment: occasional     Marital status: .      Review of Systems:    Constitutional: Negative for fever, chills.   HENT: Negative for ear pain, congestion, sore throat, and ear discharge.    Eyes: Negative for blurred vision, double vision.   Respiratory: Negative for cough, hemoptysis, shortness of breath, wheezing and stridor.  Cardiovascular: Negative for chest pain, palpitations and orthopnea.   Gastrointestinal: Negative for heartburn, nausea, vomiting, abdominal pain and blood in stool.   Genitourinary: Negative for urgency, frequency   Musculoskeletal: Negative for myalgias, back pain and joint pain.   Neurological: Negative for tingling, speech change and headaches.   Endo/Heme/Allergies: Does not bruise/bleed easily.   Psychiatric/Behavioral: Negative for depression, suicidal ideas and hallucinations. The patient is not nervous/anxious.    Physical Examination:  /94   Pulse 70   Temp 98.3  F (36.8  C) (Oral)    "Resp 18   Ht 1.778 m (5' 10\")   Wt 94.6 kg (208 lb 9.6 oz)   SpO2 96%   BMI 29.93 kg/m    General: Alert and orientedx4, no acute distress, well-developed/well-nourished, ambulating without assistance  HEENT: normocephalic atraumatic, extraocular movements intact, sclerae anicteric, Trachea mideline  Chest:   Clear to auscultation bilaterally.  Cardiac: S1S2 , regular rate and rhythm without additional sounds  Abdomen: Soft, non-tender, non-distended  Extremities: Cursory exam unremarkable.  No peripheral edema noted.  Skin: Warm, dry, < 2 sec cap refill  Neuro: CN 2-12 grossly intact, no focal deficit, GCS 15  Psych: Pleasant, calm, asks appropriate questions      Assessment/Plan:  #1 Colonoscopy  #2 Personal history of colon polyps  #3 Family history of colon cancer in a 1st degree relative    Asif Leon and I had a discussion about colonoscopies.  The indications, risks, benefits, althernatives and technical aspects of whole colon colonoscopy were outlined with risks including, but not limited to, perforation, bleeding and inability to visualize entire colon.  Management of each was reviewed including the risk for life saving surgery and possible admittance to the hospital.  His questions were asked and answered.  We will proceed with colonoscopy with Dr. Vilchis as scheduled.  Daxa Bhatti [facility]  [address] [place] [address]    Referring Provider:  No referring provider defined for this encounter.     Primary Care Provider:  Laury Trivedi  "

## 2018-12-20 NOTE — OP NOTE
Asif Leon MRN# [id number]   YOB: 1965 Age: 53 year old      Date of Admission:  12/20/2018  Date of Service:   12/20/18    Primary care provider: Laury Trivedi    PREOPERATIVE DIAGNOSIS:  Colon cancer screening        POSTOPERATIVE DIAGNOSIS:  Colon polyp x 1          PROCEDURE:  Colonoscopy with polypectomy x 1          INDICATIONS:  Screening colonoscopy.      Specimen:   ID Type Source Tests Collected by Time Destination   A :  Polyp Large Intestine, Rectum SURGICAL PATHOLOGY EXAM Yared Vilchis MD 12/20/2018  2:40 PM        SURGEON: Yared Vilchis    DESCRIPTION OF PROCEDURE: Asif Leon was brought into the endoscopy suite and placed in the left lateral decubitus position. After preprocedural pause and attended monitored anesthesia was administered, the external anus was inspected and had anterior skin tag. Digital rectal exam was normal. The colonoscope was inserted and advanced under direct visualization to the level of the cecum which was identified by the appendiceal orifice and the ileocecal valve. The prep was excellent.. Upon slow withdrawal of the colonoscope, approximately 95% of the mucosa was directly visualized. There was a small polyp in the rectum removed with biopsy forcep. The rest of the colon was without mucosal abnormality. There was no evidence of further polyps, inflammation, bleeding or AVMs. Retroflexion of the rectum was normal. The extra air was removed from the colon, and the colonoscope withdrawn. The patient tolerated the procedure well and was taken to postanesthesia care unit.     We invite the patient to return in 5 years due to family history for follow up screening evaluation.    Yared Vilchis

## 2018-12-20 NOTE — DISCHARGE INSTRUCTIONS
INSTRUCTIONS AFTER COLONOSCOPY    WHEN YOU ARE BACK HOME:    Plan to rest for an hour or two after you get home.    You may have some cramping or pressure until you pass gas.    You may resume your regular medications.    Eat a small, light meal at first, and then gradually return to normal meal sizes.  If you had a polyp removed:    Slight bleeding may occur.  You may have a slight blood stain on the toilet paper after a bowel movement.    To lessen the chance of bleeding, avoid heavy exercise for ONE WEEK.  This includes heavy lifting, vigorous sport activities, and heavy physical labor.  You may resume your normal sexual activity.      Avoid aspirin or aspirin products if instructed by your doctor.    WHAT TO WATCH FOR:  Problems rarely occur after the exam; however, it is important for you to watch for early signs of possible problems.  If you have     Unusual pain in your abdomen    Nausea and vomiting that persists    Excessive bleeding    Black or bloody bowel movements    Fever or temperature above 100.6 F  Please call your doctor ([facility] [phone]) or go to the nearest hospital emergency room.    Post-Anesthesia Patient Instructions    IMMEDIATELY FOLLOWING SURGERY:  Do not drive or operate machinery for the first twenty four hours after surgery.  Do not make any important decisions for twenty four hours after surgery or while taking narcotic pain medications or sedatives.  If you develop intractable nausea and vomiting or a severe headache please notify your doctor immediately.    FOLLOW-UP:  Please make an appointment with your surgeon as instructed. You do not need to follow up with anesthesia unless specifically instructed to do so.    WOUND CARE INSTRUCTIONS (if applicable):  Keep a dry clean dressing on the anesthesia/puncture wound site if there is drainage.  Once the wound has quit draining you may leave it open to air.  Generally you should leave the bandage intact for twenty four  hours unless there is drainage.  If the epidural site drains for more than 36-48 hours please call the anesthesia department.    QUESTIONS?:  Please feel free to call your physician or the hospital  if you have any questions, and they will be happy to assist you.

## 2018-12-20 NOTE — ANESTHESIA CARE TRANSFER NOTE
Patient: Asif Leon    Procedure(s):  COLONOSCOPY with Polypectomy    Diagnosis: SCREENING FOR COLON CANCER  Diagnosis Additional Information: No value filed.    Anesthesia Type:   MAC     Note:  Airway :Nasal Cannula  Patient transferred to:Phase II  Handoff Report: Identifed the Patient, Identified the Reponsible Provider, Reviewed the pertinent medical history, Discussed the surgical course, Reviewed Intra-OP anesthesia mangement and issues during anesthesia, Set expectations for post-procedure period and Allowed opportunity for questions and acknowledgement of understanding      Vitals: (Last set prior to Anesthesia Care Transfer)    CRNA VITALS  12/20/2018 1417 - 12/20/2018 1447      12/20/2018             NIBP:  103/58    NIBP Mean:  76    Ht Rate:  56    Resp Rate (set):  8                Electronically Signed By: JEAN PIERRE Aguillon CRNA  December 20, 2018  2:47 PM

## 2018-12-20 NOTE — OR NURSING
Up w/o vertigo.Took juice and cookies w/o nausea.Abd.soft.Patient and responsible adult given discharge instructions with no questions regarding instructions. Stacy score 20. Pain level 0/10.  Discharged from unit via walking. Patient discharged to home.

## 2018-12-21 NOTE — ANESTHESIA POSTPROCEDURE EVALUATION
Patient: Asif Leon    Procedure(s):  COLONOSCOPY with Polypectomy    Diagnosis:SCREENING FOR COLON CANCER  Diagnosis Additional Information: No value filed.    Anesthesia Type:  MAC    Note:  Anesthesia Post Evaluation    Patient location during evaluation: Phase 2 and Bedside  Patient participation: Able to fully participate in evaluation  Level of consciousness: awake and alert  Pain management: adequate  Airway patency: patent  Cardiovascular status: acceptable  Respiratory status: acceptable  Hydration status: stable  PONV: none     Anesthetic complications: None          Last vitals:  Vitals:    12/20/18 1515 12/20/18 1520 12/20/18 1525   BP: 123/84 126/85 126/92   Pulse:      Resp: 18 18 18   Temp:   97.6  F (36.4  C)   SpO2: 94% 95% 96%         Electronically Signed By: Aiden Baltazar MD  December 21, 2018  8:10 AM

## 2018-12-26 ENCOUNTER — DOCUMENTATION ONLY (OUTPATIENT)
Dept: SLEEP MEDICINE | Facility: HOSPITAL | Age: 53
End: 2018-12-26

## 2018-12-26 DIAGNOSIS — G47.9 DISTURBANCE IN SLEEP BEHAVIOR: ICD-10-CM

## 2018-12-26 DIAGNOSIS — R06.00 DYSPNEA AND RESPIRATORY ABNORMALITY: ICD-10-CM

## 2018-12-26 DIAGNOSIS — R06.89 DYSPNEA AND RESPIRATORY ABNORMALITY: ICD-10-CM

## 2018-12-26 DIAGNOSIS — G47.00 INSOMNIA, UNSPECIFIED TYPE: ICD-10-CM

## 2018-12-26 DIAGNOSIS — G47.10 ORGANIC HYPERSOMNIA: ICD-10-CM

## 2018-12-26 LAB — COPATH REPORT: NORMAL

## 2018-12-26 NOTE — Clinical Note
Chart ready for reviewHad sleep test in 2003 will have it scanned into media tab Snoring witnessed apnea and EDS.

## 2018-12-26 NOTE — PROGRESS NOTES
STOP BANG       Name: Asif Leon MRN# [id number]   Age: 53 year old YOB: 1965     Stop Bang questionnaire completed with a score of >3 to allow for HST     Have you been told you snore loudly (louder than talking or loud enough to be heard through doors)? YES    Do you often feel tired, fatigued, or sleepy during the daytime? YES    Has anyone observed you stop breathing during your sleep? YES    Do you have or are you being treated for high blood pressure? NO    Is your BMI greater than 35? NO    Is your neck size circumference 16 inches or greater? NO    Are you over 50 years old? YES    Stop Bang Score (# of yes): 5
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
## 2018-12-26 NOTE — Clinical Note
Recommend Polysomnogram (using 4% desaturation/Medicare/2012 AASM 1B scoring rules) for high probability obstructive sleep apnea, possible periodic limb movement disorder. Patient is a poor candidate for Home Sleep Testing due to previous normal Polysomnogram at similar weight. Ambien if needed.Orders placed

## 2019-01-18 DIAGNOSIS — E55.9 VITAMIN D DEFICIENCY: ICD-10-CM

## 2019-01-18 RX ORDER — ERGOCALCIFEROL 1.25 MG/1
CAPSULE, LIQUID FILLED ORAL
Qty: 36 CAPSULE | Refills: 0 | Status: CANCELLED | OUTPATIENT
Start: 2019-01-18

## 2019-01-18 NOTE — TELEPHONE ENCOUNTER
vitamin D2 (ERGOCALCIFEROL) 78683 units (1250 mcg) capsule  Last Written Prescription Date:  12/11/18  Last Fill Quantity: 36,   # refills: 0  Last Office Visit: 12/10/18  Future Office visit:

## 2019-02-11 DIAGNOSIS — F41.9 ANXIETY: ICD-10-CM

## 2019-02-11 RX ORDER — ESCITALOPRAM OXALATE 20 MG/1
TABLET ORAL
Qty: 90 TABLET | Refills: 1 | Status: SHIPPED | OUTPATIENT
Start: 2019-02-11 | End: 2020-06-22

## 2019-02-11 NOTE — TELEPHONE ENCOUNTER
Lexapro  Last Written Prescription Date: 4/20/18  Last Fill Quantity: 90 # of Refills: 1  Last Office Visit: 12/10/18

## 2019-02-13 PROBLEM — E55.9 VITAMIN D DEFICIENCY: Status: ACTIVE | Noted: 2018-04-25

## 2019-02-13 PROBLEM — Z86.0100 HISTORY OF COLONIC POLYPS: Chronic | Status: ACTIVE | Noted: 2018-12-10

## 2019-02-13 PROBLEM — Z86.0100 HISTORY OF COLONIC POLYPS: Status: ACTIVE | Noted: 2018-12-10

## 2019-02-13 PROBLEM — M53.3 SACROILIAC JOINT DYSFUNCTION: Status: ACTIVE | Noted: 2018-12-10

## 2019-02-13 PROBLEM — G47.30 OBSERVED SLEEP APNEA: Status: ACTIVE | Noted: 2018-12-10

## 2019-02-13 PROBLEM — F41.9 ANXIETY: Chronic | Status: ACTIVE | Noted: 2018-01-15

## 2019-02-13 RX ORDER — ZOLPIDEM TARTRATE 5 MG/1
TABLET ORAL
Qty: 1 TABLET | Refills: 0 | Status: SHIPPED | OUTPATIENT
Start: 2019-02-13 | End: 2019-05-22

## 2019-02-13 NOTE — PROGRESS NOTES
Loud snoring witnessed apneas, frequent awakenings, snore/gasping arousals, daytime  sleepiness (ESS 15), morning headaches. Probable insufficient sleep time. possible insomnia    [facility] ([facility]) 9/23/2003 (210 #) normal, AHI 1.7    Sleep ROS notable for   - Sleep onset difficulties   - Restless sleep  - Possible restless leg syndrome symptoms     Sleep schedule:  Workdays 10 pm - 3:45 am   Weekend/non work days 10-11 pm - 6 am    10 point ROS negative with exceptions noted above and below.   Bloody nose YES  Loose or watery stools YES  Joint or bone pain YES  Depression YES  Anxiety YES    Recommend Polysomnogram (using 4% desaturation/Medicare/2012 AASM 1B scoring rules) for high probability obstructive sleep apnea., possible periodic limb movement disorder. Patient is a poor candidate for Home Sleep Testing due to previous normal Polysomnogram at similar weight and possible periodic limb movement disorder. Ambien if needed.    Orders placed

## 2019-02-14 ENCOUNTER — TELEPHONE (OUTPATIENT)
Dept: SLEEP MEDICINE | Facility: HOSPITAL | Age: 54
End: 2019-02-14

## 2019-03-06 ENCOUNTER — TELEPHONE (OUTPATIENT)
Dept: SLEEP MEDICINE | Facility: HOSPITAL | Age: 54
End: 2019-03-06

## 2019-03-08 DIAGNOSIS — E55.9 VITAMIN D DEFICIENCY: ICD-10-CM

## 2019-03-11 RX ORDER — ERGOCALCIFEROL 1.25 MG/1
CAPSULE, LIQUID FILLED ORAL
Qty: 36 CAPSULE | Refills: 0 | OUTPATIENT
Start: 2019-03-11

## 2019-03-11 NOTE — TELEPHONE ENCOUNTER
Should not need this filled, #36 Rx'd in December.  Should maintain on 5000 U OTC.    [facility]  [phone]

## 2019-03-11 NOTE — TELEPHONE ENCOUNTER
Last filled 12/11/18 #36 R0    Per pt sig- 1 po 3 times weekly for three months then discontinue    Please advise. Thank you!

## 2019-03-11 NOTE — TELEPHONE ENCOUNTER
Vitamin D2  Last Written Prescription Date: 3/11/19  Last Fill Quantity: 36 # of Refills: 0  Last Office Visit: 12/10/18

## 2019-04-16 ENCOUNTER — TRANSFERRED RECORDS (OUTPATIENT)
Dept: HEALTH INFORMATION MANAGEMENT | Facility: CLINIC | Age: 54
End: 2019-04-16

## 2019-05-16 ENCOUNTER — TELEPHONE (OUTPATIENT)
Dept: FAMILY MEDICINE | Facility: OTHER | Age: 54
End: 2019-05-16

## 2019-05-16 NOTE — PROGRESS NOTES
[facility]  [address]  [place] [address]  [phone]  Dept: [phone]    PRE-OP EVALUATION:  Today's date: 2019      Asif Leon (: 1965) presents for pre-operative evaluation assessment as requested by Dr. Hand.  He requires evaluation and anesthesia risk assessment prior to undergoing surgery/procedure for treatment of Right hip pain, secondary to osteoarthritis      Proposed Surgery/ Procedure: Right TKA  Date of Surgery/ Procedure: 2019  Time of Surgery/ Procedure: To be determined  Hospital/Surgical Facility: [facility]  Primary Physician: Laury Trivedi  Type of Anesthesia Anticipated: to be determined    Patient has a Health Care Directive or Living Will:  YES     1. NO - Do you have a history of heart attack, stroke, stent, bypass or surgery on an artery in the head, neck, heart or legs?  2. NO - Do you ever have any pain or discomfort in your chest?  3. NO - Do you have a history of  Heart Failure?  4. NO - Are you troubled by shortness of breath when: walking on the level, up a slight hill or at night?  5. NO - Do you currently have a cold, bronchitis or other respiratory infection?  6. NO - Do you have a cough, shortness of breath or wheezing?  7. NO - Do you sometimes get pains in the calves of your legs when you walk?  8. NO - Do you or anyone in your family have previous history of blood clots?  9. NO - Do you or does anyone in your family have a serious bleeding problem such as prolonged bleeding following surgeries or cuts?  10. NO - Have you ever had problems with anemia or been told to take iron pills?  11. NO - Have you had any abnormal blood loss such as black, tarry or bloody stools, or abnormal vaginal bleeding?  12. NO - Have you ever had a blood transfusion?  13. NO - Have you or any of your relatives ever had problems with anesthesia?  14. YES - Do you have sleep apnea, excessive snoring or daytime  drowsiness?Sleep Apnea  15. NO - Do you have any prosthetic heart valves?  16. NO - Do you have prosthetic joints?        HPI:     HPI related to upcoming procedure:   Osteoarthritis of the Right Hip      See problem list for active medical problems.  Problems all longstanding and stable, except as noted/documented.  See ROS for pertinent symptoms related to these conditions.           PSA and D level are due, ordered today                                                                                                                                                       .    MEDICAL HISTORY:     Patient Active Problem List    Diagnosis Date Noted     Observed sleep apnea 12/10/2018     Priority: Medium     Sacroiliac joint dysfunction 12/10/2018     Priority: Medium     History of colonic polyps 12/10/2018     Priority: Medium     Vitamin D deficiency 04/25/2018     Priority: Medium     Anxiety 01/15/2018     Priority: Medium     Chronic midline low back pain without sciatica 01/15/2018     Priority: Medium     Primary insomnia 01/15/2018     Priority: Medium      Past Medical History:   Diagnosis Date     Depressive disorder      TGA (transient global amnesia) 01/04/2018    admitted with a 6 hour episode of confusion and anterograde and retrograde amnesia. CT head, CTA head and neck, MRI brain and EEG were negative. Neurology was consulted and felt this to have been an episode of transient global amnesia that can be followed by his PCP with referral to neurology if recurs.      Past Surgical History:   Procedure Laterality Date     AS COLONOSCOPY W BX FORCEP/CAUTERY REMOVAL KENYA/POLYP/LESION N/A 01/01/1999    stomache pain/family hx     COLONOSCOPY  01/01/2011    f/u polyectomy/ family HX crohns     COLONOSCOPY N/A 12/20/2018    Procedure: COLONOSCOPY with Polypectomy;  Surgeon: Yared Vilchis MD;  Location: [facility]     ORTHOPEDIC SURGERY      shoulder surgery     RECESSION RESECTION (REPAIR STRABISMUS) BILATERAL   "10/18/2011    Procedure:RECESSION RESECTION (REPAIR STRABISMUS) BILATERAL; Strabismus Repair Right; Surgeon:MERCEDEZ CHAUHAN; Location: OR     Current Outpatient Medications   Medication Sig Dispense Refill     escitalopram (LEXAPRO) 20 MG tablet TAKE 1 TABLET DAILY 90 tablet 1     cyclobenzaprine (FLEXERIL) 10 MG tablet Take 1 tablet (10 mg) by mouth nightly as needed for muscle spasms (Patient not taking: Reported on 5/22/2019) 30 tablet 5     temazepam (RESTORIL) 15 MG capsule 1 po qd (Patient not taking: Reported on 5/22/2019) 30 capsule 5     OTC products: None, except as noted above    Allergies   Allergen Reactions     No Clinical Screening - See Comments      Seasonal allergies      Latex Allergy: NO    Social History     Tobacco Use     Smoking status: Never Smoker     Smokeless tobacco: Never Used   Substance Use Topics     Alcohol use: Yes     Comment: occasional     History   Drug Use No       REVIEW OF SYSTEMS:   CONSTITUTIONAL: NEGATIVE for fever, chills, change in weight  INTEGUMENTARY/SKIN: NEGATIVE for worrisome rashes, moles or lesions  EYES: NEGATIVE for vision changes or irritation  ENT/MOUTH: NEGATIVE for ear, mouth and throat problems  RESP: NEGATIVE for significant cough or SOB  BREAST: NEGATIVE for masses, tenderness or discharge  CV: NEGATIVE for chest pain, palpitations or peripheral edema  GI: NEGATIVE for nausea, abdominal pain, heartburn, or change in bowel habits  : NEGATIVE for frequency, dysuria, or hematuria  MUSCULOSKELETAL:Right hip pain  NEURO: NEGATIVE for weakness, dizziness or paresthesias  ENDOCRINE: NEGATIVE for temperature intolerance, skin/hair changes  HEME: NEGATIVE for bleeding problems  PSYCHIATRIC: NEGATIVE for changes in mood or affect      EXAM:   /74 (BP Location: Left arm, Patient Position: Sitting, Cuff Size: Adult Regular)   Pulse 72   Temp 97.4  F (36.3  C) (Tympanic)   Resp 14   Ht 1.778 m (5' 10\")   Wt 97.5 kg (215 lb)   SpO2 96%   BMI 30.85 " kg/m           GENERAL APPEARANCE: healthy, alert and no distress     EYES: EOMI,  PERRL     HENT: ear canals and TM's normal and nose and mouth without ulcers or lesions     NECK: no adenopathy, no asymmetry, masses, or scars and thyroid normal to palpation     RESP: lungs clear to auscultation - no rales, rhonchi or wheezes     CV: regular rates and rhythm, normal S1 S2, no S3 or S4 and no murmur, click or rub     ABDOMEN:  soft, nontender, no HSM or masses and bowel sounds normal     MS: Right hip, lateral pain     SKIN: no suspicious lesions or rashes     NEURO: Normal strength and tone, sensory exam grossly normal, mentation intact and speech normal     PSYCH: mentation appears normal. and affect normal/bright     LYMPHATICS: No cervical adenopathy    DIAGNOSTICS:     EKG attached - NSR      PROCEDURE:  XR CHEST 2 VW    HISTORY:  Preop general physical exam.     COMPARISON:  8/22/2017    FINDINGS:   The cardiac silhouette is normal in size. The pulmonary vasculature is  normal.  The lungs are clear. No pleural effusion or pneumothorax.      Impression     IMPRESSION:  No acute cardiopulmonary disease.      IVAN RESTREPO MD     Results for orders placed or performed in visit on 05/22/19   CBC with platelets differential   Result Value Ref Range    WBC 5.1 4.0 - 11.0 10e9/L    RBC Count 5.04 4.4 - 5.9 10e12/L    Hemoglobin 14.1 13.3 - 17.7 g/dL    Hematocrit 40.7 40.0 - 53.0 %    MCV 81 78 - 100 fl    MCH 28.0 26.5 - 33.0 pg    MCHC 34.6 31.5 - 36.5 g/dL    RDW 14.7 10.0 - 15.0 %    Platelet Count 135 (L) 150 - 450 10e9/L    % Neutrophils 62.6 %    % Lymphocytes 24.1 %    % Monocytes 9.6 %    % Eosinophils 3.3 %    % Basophils 0.4 %    Absolute Neutrophil 3.2 1.6 - 8.3 10e9/L    Absolute Lymphocytes 1.2 0.8 - 5.3 10e9/L    Absolute Monocytes 0.5 0.0 - 1.3 10e9/L    Absolute Eosinophils 0.2 0.0 - 0.7 10e9/L    Absolute Basophils 0.0 0.0 - 0.2 10e9/L    Diff Method Automated Method    Comprehensive metabolic panel  (BMP + Alb, Alk Phos, ALT, AST, Total. Bili, TP)   Result Value Ref Range    Sodium 140 133 - 144 mmol/L    Potassium 4.2 3.4 - 5.3 mmol/L    Chloride 108 94 - 109 mmol/L    Carbon Dioxide 24 20 - 32 mmol/L    Anion Gap 8 3 - 14 mmol/L    Glucose 95 70 - 99 mg/dL    Urea Nitrogen 18 7 - 30 mg/dL    Creatinine 1.02 0.66 - 1.25 mg/dL    GFR Estimate 82 >60 mL/min/[1.73_m2]    GFR Estimate If Black >90 >60 mL/min/[1.73_m2]    Calcium 9.2 8.5 - 10.1 mg/dL    Bilirubin Total 0.6 0.2 - 1.3 mg/dL    Albumin 4.0 3.4 - 5.0 g/dL    Protein Total 7.2 6.8 - 8.8 g/dL    Alkaline Phosphatase 74 40 - 150 U/L    ALT 42 0 - 70 U/L    AST 28 0 - 45 U/L   PSA, screen   Result Value Ref Range    PSA 1.86 0 - 4 ug/L   *UA reflex to Microscopic and Culture [facility]   Result Value Ref Range    Color Urine Yellow     Appearance Urine Clear     Glucose Urine Negative NEG^Negative mg/dL    Bilirubin Urine Negative NEG^Negative    Ketones Urine Negative NEG^Negative mg/dL    Specific Gravity Urine 1.020 1.003 - 1.035    Blood Urine Negative NEG^Negative    pH Urine 6.5 5.0 - 7.0 pH    Protein Albumin Urine Negative NEG^Negative mg/dL    Urobilinogen Urine 0.2 0.2 - 1.0 EU/dL    Nitrite Urine Negative NEG^Negative    Leukocyte Esterase Urine Negative NEG^Negative    Source Midstream Urine    Reflex INR   Result Value Ref Range    INR 0.98 0.80 - 1.20         Recent Labs   Lab Test 12/17/14  0902   HGB 13.6   *        IMPRESSION:   Reason for surgery/procedure: Right hip pain, osteoarthritis      The proposed surgical procedure is considered LOW risk.    REVISED CARDIAC RISK INDEX  The patient has the following serious cardiovascular risks for perioperative complications such as (MI, PE, VFib and 3  AV Block):  No serious cardiac risks  INTERPRETATION: 0 risks: Class I (very low risk - 0.4% complication rate)    The patient has the following additional risks for perioperative complications:  No identified additional risks       ICD-10-CM    1. Preop general physical exam Z01.818 CBC with platelets differential     Comprehensive metabolic panel (BMP + Alb, Alk Phos, ALT, AST, Total. Bili, TP)     EKG 12-lead complete w/read - (Clinic Performed)     XR CHEST 2 VW (Clinic Performed)     *UA reflex to Microscopic and Culture - [facility]     Reflex INR   2. Hip pain, right M25.551    3. Screening for prostate cancer Z12.5 PSA, screen   4. Vitamin D deficiency E55.9 Vitamin D Deficiency       RECOMMENDATIONS:       APPROVAL GIVEN to proceed with proposed procedure, without further diagnostic evaluation       Signed Electronically by: Laury Trivedi NP    Copy of this evaluation report is provided to requesting physician.    [facility] Preop Guidelines    Revised Cardiac Risk Index

## 2019-05-16 NOTE — TELEPHONE ENCOUNTER
Patient has a pre-op appt scheduled for 5-22-19 and he wanted to make sure that everything is done on that day.  Explained that this is pretty much standard at a pre-op appt.  He will need an EKG done.  He also has a list of things that need to be done.  If you have any questions for him, please call [phone].  Thank you

## 2019-05-22 ENCOUNTER — ANCILLARY PROCEDURE (OUTPATIENT)
Dept: GENERAL RADIOLOGY | Facility: OTHER | Age: 54
End: 2019-05-22
Attending: NURSE PRACTITIONER
Payer: COMMERCIAL

## 2019-05-22 ENCOUNTER — OFFICE VISIT (OUTPATIENT)
Dept: FAMILY MEDICINE | Facility: OTHER | Age: 54
End: 2019-05-22
Attending: NURSE PRACTITIONER
Payer: COMMERCIAL

## 2019-05-22 VITALS
OXYGEN SATURATION: 96 % | SYSTOLIC BLOOD PRESSURE: 122 MMHG | HEIGHT: 70 IN | WEIGHT: 215 LBS | RESPIRATION RATE: 14 BRPM | BODY MASS INDEX: 30.78 KG/M2 | DIASTOLIC BLOOD PRESSURE: 74 MMHG | TEMPERATURE: 97.4 F | HEART RATE: 72 BPM

## 2019-05-22 DIAGNOSIS — E55.9 VITAMIN D DEFICIENCY: ICD-10-CM

## 2019-05-22 DIAGNOSIS — Z01.818 PREOP GENERAL PHYSICAL EXAM: ICD-10-CM

## 2019-05-22 DIAGNOSIS — Z01.818 PREOP GENERAL PHYSICAL EXAM: Primary | ICD-10-CM

## 2019-05-22 DIAGNOSIS — M25.551 HIP PAIN, RIGHT: ICD-10-CM

## 2019-05-22 DIAGNOSIS — Z12.5 SCREENING FOR PROSTATE CANCER: ICD-10-CM

## 2019-05-22 LAB
ALBUMIN SERPL-MCNC: 4 G/DL (ref 3.4–5)
ALBUMIN UR-MCNC: NEGATIVE MG/DL
ALP SERPL-CCNC: 74 U/L (ref 40–150)
ALT SERPL W P-5'-P-CCNC: 42 U/L (ref 0–70)
ANION GAP SERPL CALCULATED.3IONS-SCNC: 8 MMOL/L (ref 3–14)
APPEARANCE UR: CLEAR
AST SERPL W P-5'-P-CCNC: 28 U/L (ref 0–45)
BASOPHILS # BLD AUTO: 0 10E9/L (ref 0–0.2)
BASOPHILS NFR BLD AUTO: 0.4 %
BILIRUB SERPL-MCNC: 0.6 MG/DL (ref 0.2–1.3)
BILIRUB UR QL STRIP: NEGATIVE
BUN SERPL-MCNC: 18 MG/DL (ref 7–30)
CALCIUM SERPL-MCNC: 9.2 MG/DL (ref 8.5–10.1)
CHLORIDE SERPL-SCNC: 108 MMOL/L (ref 94–109)
CO2 SERPL-SCNC: 24 MMOL/L (ref 20–32)
COLOR UR AUTO: YELLOW
CREAT SERPL-MCNC: 1.02 MG/DL (ref 0.66–1.25)
DIFFERENTIAL METHOD BLD: ABNORMAL
EOSINOPHIL # BLD AUTO: 0.2 10E9/L (ref 0–0.7)
EOSINOPHIL NFR BLD AUTO: 3.3 %
ERYTHROCYTE [DISTWIDTH] IN BLOOD BY AUTOMATED COUNT: 14.7 % (ref 10–15)
GFR SERPL CREATININE-BSD FRML MDRD: 82 ML/MIN/{1.73_M2}
GLUCOSE SERPL-MCNC: 95 MG/DL (ref 70–99)
GLUCOSE UR STRIP-MCNC: NEGATIVE MG/DL
HCT VFR BLD AUTO: 40.7 % (ref 40–53)
HGB BLD-MCNC: 14.1 G/DL (ref 13.3–17.7)
HGB UR QL STRIP: NEGATIVE
INR PPP: 0.98 (ref 0.8–1.2)
KETONES UR STRIP-MCNC: NEGATIVE MG/DL
LEUKOCYTE ESTERASE UR QL STRIP: NEGATIVE
LYMPHOCYTES # BLD AUTO: 1.2 10E9/L (ref 0.8–5.3)
LYMPHOCYTES NFR BLD AUTO: 24.1 %
MCH RBC QN AUTO: 28 PG (ref 26.5–33)
MCHC RBC AUTO-ENTMCNC: 34.6 G/DL (ref 31.5–36.5)
MCV RBC AUTO: 81 FL (ref 78–100)
MONOCYTES # BLD AUTO: 0.5 10E9/L (ref 0–1.3)
MONOCYTES NFR BLD AUTO: 9.6 %
NEUTROPHILS # BLD AUTO: 3.2 10E9/L (ref 1.6–8.3)
NEUTROPHILS NFR BLD AUTO: 62.6 %
NITRATE UR QL: NEGATIVE
PH UR STRIP: 6.5 PH (ref 5–7)
PLATELET # BLD AUTO: 135 10E9/L (ref 150–450)
POTASSIUM SERPL-SCNC: 4.2 MMOL/L (ref 3.4–5.3)
PROT SERPL-MCNC: 7.2 G/DL (ref 6.8–8.8)
PSA SERPL-ACNC: 1.86 UG/L (ref 0–4)
RBC # BLD AUTO: 5.04 10E12/L (ref 4.4–5.9)
SODIUM SERPL-SCNC: 140 MMOL/L (ref 133–144)
SOURCE: NORMAL
SP GR UR STRIP: 1.02 (ref 1–1.03)
UROBILINOGEN UR STRIP-ACNC: 0.2 EU/DL (ref 0.2–1)
WBC # BLD AUTO: 5.1 10E9/L (ref 4–11)

## 2019-05-22 PROCEDURE — 85025 COMPLETE CBC W/AUTO DIFF WBC: CPT | Performed by: NURSE PRACTITIONER

## 2019-05-22 PROCEDURE — 81003 URINALYSIS AUTO W/O SCOPE: CPT | Performed by: NURSE PRACTITIONER

## 2019-05-22 PROCEDURE — 85610 PROTHROMBIN TIME: CPT | Performed by: NURSE PRACTITIONER

## 2019-05-22 PROCEDURE — 99214 OFFICE O/P EST MOD 30 MIN: CPT | Mod: 25 | Performed by: NURSE PRACTITIONER

## 2019-05-22 PROCEDURE — 82306 VITAMIN D 25 HYDROXY: CPT | Mod: 90 | Performed by: NURSE PRACTITIONER

## 2019-05-22 PROCEDURE — 93000 ELECTROCARDIOGRAM COMPLETE: CPT | Performed by: INTERNAL MEDICINE

## 2019-05-22 PROCEDURE — 80053 COMPREHEN METABOLIC PANEL: CPT | Performed by: NURSE PRACTITIONER

## 2019-05-22 PROCEDURE — 99000 SPECIMEN HANDLING OFFICE-LAB: CPT | Performed by: NURSE PRACTITIONER

## 2019-05-22 PROCEDURE — G0103 PSA SCREENING: HCPCS | Performed by: NURSE PRACTITIONER

## 2019-05-22 PROCEDURE — 36415 COLL VENOUS BLD VENIPUNCTURE: CPT | Performed by: NURSE PRACTITIONER

## 2019-05-22 PROCEDURE — 71046 X-RAY EXAM CHEST 2 VIEWS: CPT | Mod: TC

## 2019-05-22 ASSESSMENT — PAIN SCALES - GENERAL: PAINLEVEL: MILD PAIN (2)

## 2019-05-22 ASSESSMENT — MIFFLIN-ST. JEOR: SCORE: 1816.48

## 2019-05-22 NOTE — NURSING NOTE
"Chief Complaint   Patient presents with     Pre-Op Exam       Initial /74 (BP Location: Left arm, Patient Position: Sitting, Cuff Size: Adult Regular)   Pulse 72   Temp 97.4  F (36.3  C) (Tympanic)   Resp 14   Ht 1.778 m (5' 10\")   Wt 97.5 kg (215 lb)   SpO2 96%   BMI 30.85 kg/m   Estimated body mass index is 30.85 kg/m  as calculated from the following:    Height as of this encounter: 1.778 m (5' 10\").    Weight as of this encounter: 97.5 kg (215 lb).  Medication Reconciliation: complete    Janell Alvarado LPN    "

## 2019-05-22 NOTE — RESULT ENCOUNTER NOTE
Normal, please notify patient  D level pending    Pre op signed    [facility]  [phone]

## 2019-05-22 NOTE — PROGRESS NOTES
Faxed PreOp ECG labs and chest xray to Dr. Hand @ [facility]  Ph. [phone] Fx. [phone]

## 2019-05-23 LAB — DEPRECATED CALCIDIOL+CALCIFEROL SERPL-MC: 33 UG/L (ref 20–75)

## 2019-06-18 ENCOUNTER — TRANSFERRED RECORDS (OUTPATIENT)
Dept: HEALTH INFORMATION MANAGEMENT | Facility: CLINIC | Age: 54
End: 2019-06-18

## 2019-07-09 ENCOUNTER — TRANSFERRED RECORDS (OUTPATIENT)
Dept: HEALTH INFORMATION MANAGEMENT | Facility: CLINIC | Age: 54
End: 2019-07-09

## 2019-07-09 DIAGNOSIS — R06.00 DYSPNEA AND RESPIRATORY ABNORMALITY: ICD-10-CM

## 2019-07-09 DIAGNOSIS — G47.9 DISTURBANCE IN SLEEP BEHAVIOR: ICD-10-CM

## 2019-07-09 DIAGNOSIS — R06.89 DYSPNEA AND RESPIRATORY ABNORMALITY: ICD-10-CM

## 2019-07-09 DIAGNOSIS — G47.00 INSOMNIA, UNSPECIFIED TYPE: ICD-10-CM

## 2019-07-09 DIAGNOSIS — M25.551 HIP PAIN, RIGHT: Primary | ICD-10-CM

## 2019-07-09 DIAGNOSIS — G47.10 ORGANIC HYPERSOMNIA: ICD-10-CM

## 2019-07-09 LAB — ERYTHROCYTE [SEDIMENTATION RATE] IN BLOOD BY WESTERGREN METHOD: 46 MM/H (ref 0–20)

## 2019-07-09 PROCEDURE — 85652 RBC SED RATE AUTOMATED: CPT | Performed by: ORTHOPAEDIC SURGERY

## 2019-07-09 PROCEDURE — 86140 C-REACTIVE PROTEIN: CPT | Performed by: ORTHOPAEDIC SURGERY

## 2019-07-09 PROCEDURE — 36415 COLL VENOUS BLD VENIPUNCTURE: CPT | Performed by: ORTHOPAEDIC SURGERY

## 2019-07-10 LAB — CRP SERPL-MCNC: 74.8 MG/L (ref 0–8)

## 2019-07-11 ENCOUNTER — TRANSFERRED RECORDS (OUTPATIENT)
Dept: HEALTH INFORMATION MANAGEMENT | Facility: CLINIC | Age: 54
End: 2019-07-11

## 2019-07-15 ENCOUNTER — TRANSFERRED RECORDS (OUTPATIENT)
Dept: HEALTH INFORMATION MANAGEMENT | Facility: CLINIC | Age: 54
End: 2019-07-15

## 2019-08-22 ENCOUNTER — TELEPHONE (OUTPATIENT)
Dept: FAMILY MEDICINE | Facility: OTHER | Age: 54
End: 2019-08-22

## 2019-08-22 NOTE — TELEPHONE ENCOUNTER
Patient called and LM on my nurse line C/O hearing problems and wanting to get in for an appointment early next week with Laury Trivedi. LM for patient to return call to the clinic. OK to take same day only slot. Ashley A. Lechevalier, LPN on 8/22/2019 at 2:34 PM

## 2019-08-26 ENCOUNTER — TELEPHONE (OUTPATIENT)
Dept: FAMILY MEDICINE | Facility: OTHER | Age: 54
End: 2019-08-26

## 2019-08-26 ENCOUNTER — HOSPITAL ENCOUNTER (EMERGENCY)
Facility: HOSPITAL | Age: 54
Discharge: HOME | End: 2019-08-26
Attending: NURSE PRACTITIONER | Admitting: NURSE PRACTITIONER
Payer: COMMERCIAL

## 2019-08-26 VITALS
HEART RATE: 66 BPM | SYSTOLIC BLOOD PRESSURE: 135 MMHG | DIASTOLIC BLOOD PRESSURE: 89 MMHG | OXYGEN SATURATION: 99 % | TEMPERATURE: 98.1 F | RESPIRATION RATE: 16 BRPM

## 2019-08-26 DIAGNOSIS — H93.13 TINNITUS, BILATERAL: Primary | ICD-10-CM

## 2019-08-26 PROCEDURE — G0463 HOSPITAL OUTPT CLINIC VISIT: HCPCS

## 2019-08-26 PROCEDURE — 99213 OFFICE O/P EST LOW 20 MIN: CPT | Mod: Z6 | Performed by: NURSE PRACTITIONER

## 2019-08-26 NOTE — DISCHARGE INSTRUCTIONS
Assessments & Plan (with Medical Decision Making)         ICD-10-CM    1. Tinnitus, bilateral H93.13 AUDIOLOGY ADULT REFERRAL     OTOLARYNGOLOGY REFERRAL     Acute, symptomatic  Ears clear of cerumen, no s/s of infection today  Referral for audiology and ENT. Make audiology appointment before ENT appointment.     Bindu Vora CNP  8/26/2019   [facility]

## 2019-08-26 NOTE — ED AVS SNAPSHOT
[facility]  [address]  [place] [address]  Phone:  [phone]                                    Asif Leon   MRN: [id number]    Department:  [facility]   Date of Visit:  8/26/2019           After Visit Summary Signature Page    I have received my discharge instructions, and my questions have been answered. I have discussed any challenges I see with this plan with the nurse or doctor.    ..........................................................................................................................................  Patient/Patient Representative Signature      ..........................................................................................................................................  Patient Representative Print Name and Relationship to Patient    ..................................................               ................................................  Date                                   Time    ..........................................................................................................................................  Reviewed by Signature/Title    ...................................................              ..............................................  Date                                               Time          22EPIC Rev 08/18

## 2019-08-26 NOTE — TELEPHONE ENCOUNTER
10:53 AM    Reason for Call: future OVERBOOK    Patient is having the following symptoms: poss tinnitus/left ear plugged for 1 weeks.    The patient is requesting an appointment for as soon as possible with Laury Trivedi.    Was an appointment offered for this call? No  If yes : Appointment type              Date    Preferred method for responding to this message: Telephone Call     What is your phone number [phone]    If we cannot reach you directly, may we leave a detailed response at the number you provided? Yes    Can this message wait until your PCP/provider returns, if unavailable today? Yes, please return call on TUES 8/27/19    Kassy Corcoran

## 2019-08-26 NOTE — ED PROVIDER NOTES
History     Chief Complaint   Patient presents with     Tinnitus     HPI  Asif Leon is a 54 year old male who presents ambulatory for left and right ear tinnitus. Right ear worse than left.  Started about 1 week ago. Sounds like a fuzzy radio station and is making hearing difficult. Denies otalgia, recent URI symptoms, headache, light headedness, s/s of bleeding. Nothing makes worse, nothing improves. No history of tinnitus until the last week. He wants to make sure his ears do not have wax or an infection. He is exposed to loud noise at work and wears hearing protection. He reports that with his hearing exam at work his hearing has decreased compared to previous years.     Allergies:  Allergies   Allergen Reactions     No Clinical Screening - See Comments      Seasonal allergies       Problem List:    Patient Active Problem List    Diagnosis Date Noted     Observed sleep apnea 12/10/2018     Priority: Medium     Sacroiliac joint dysfunction 12/10/2018     Priority: Medium     History of colonic polyps 12/10/2018     Priority: Medium     Vitamin D deficiency 04/25/2018     Priority: Medium     Anxiety 01/15/2018     Priority: Medium     Chronic midline low back pain without sciatica 01/15/2018     Priority: Medium     Primary insomnia 01/15/2018     Priority: Medium        Past Medical History:    Past Medical History:   Diagnosis Date     Depressive disorder      TGA (transient global amnesia) 01/04/2018       Past Surgical History:    Past Surgical History:   Procedure Laterality Date     AS COLONOSCOPY W BX FORCEP/CAUTERY REMOVAL KENYA/POLYP/LESION N/A 01/01/1999    stomache pain/family hx     COLONOSCOPY  01/01/2011    f/u polyectomy/ family HX crohns     COLONOSCOPY N/A 12/20/2018    Procedure: COLONOSCOPY with Polypectomy;  Surgeon: Yared Vilchis MD;  Location: [facility]     ORTHOPEDIC SURGERY      shoulder surgery     RECESSION RESECTION (REPAIR STRABISMUS) BILATERAL  10/18/2011    Procedure:RECESSION  RESECTION (REPAIR STRABISMUS) BILATERAL; Strabismus Repair Right; Surgeon:MERCEDEZ CHAUHAN; Location:[facility]       Family History:    Family History   Problem Relation Age of Onset     Cancer Mother         Brain - Cause of Death     Crohn's Disease Father         Cause of Death     Crohn's Disease Sister        Social History:  Marital Status:   [2]  Social History     Tobacco Use     Smoking status: Never Smoker     Smokeless tobacco: Never Used   Substance Use Topics     Alcohol use: Yes     Comment: occasional     Drug use: No        Medications:      cyclobenzaprine (FLEXERIL) 10 MG tablet   escitalopram (LEXAPRO) 20 MG tablet   temazepam (RESTORIL) 15 MG capsule         Review of Systems   Constitutional: Negative for activity change, appetite change, chills and fever.   HENT: Positive for hearing loss and tinnitus. Negative for congestion, ear discharge, ear pain, rhinorrhea and sore throat.    Eyes: Negative.    Respiratory: Negative.    Cardiovascular: Negative.    Gastrointestinal: Negative.    Endocrine: Negative.    Skin: Negative.    Neurological: Negative for dizziness, syncope, weakness, light-headedness and headaches.   Hematological: Does not bruise/bleed easily.       Physical Exam   BP: 135/89  Pulse: 66  Temp: 98.1  F (36.7  C)  Resp: 16  SpO2: 99 %      Physical Exam   Constitutional: He is oriented to person, place, and time. He appears well-developed and well-nourished. No distress.   HENT:   Head: Normocephalic and atraumatic.   Right Ear: Tympanic membrane, external ear and ear canal normal.   Left Ear: Tympanic membrane, external ear and ear canal normal.   Nose: Nose normal.   Mouth/Throat: Oropharynx is clear and moist. No oropharyngeal exudate.   Eyes: Pupils are equal, round, and reactive to light. Conjunctivae and EOM are normal. Right eye exhibits no discharge. Left eye exhibits no discharge.   Neck: Normal range of motion. Neck supple.   Cardiovascular: Normal rate, regular  rhythm and normal heart sounds. Exam reveals no gallop and no friction rub.   No murmur heard.  Pulmonary/Chest: Effort normal and breath sounds normal. No respiratory distress. He has no wheezes. He has no rales. He exhibits no tenderness.   Lymphadenopathy:     He has no cervical adenopathy.   Neurological: He is alert and oriented to person, place, and time.   Skin: Skin is warm and dry.   Psychiatric: He has a normal mood and affect.       ED Course        Procedures                 No results found for this or any previous visit (from the past 24 hour(s)).    Medications - No data to display    Assessments & Plan (with Medical Decision Making)         ICD-10-CM    1. Tinnitus, bilateral H93.13 AUDIOLOGY ADULT REFERRAL     OTOLARYNGOLOGY REFERRAL     Acute, symptomatic  Ears clear of cerumen, no s/s of infection today.  Referral for audiology and ENT. Make audiology appointment before ENT appointment.       I have reviewed the nursing notes.    I have reviewed the findings, diagnosis, plan and need for follow up with the patient.    Discharge Medication List as of 8/26/2019  6:59 PM          Final diagnoses:   Tinnitus, bilateral     Bindu Vora CNP  8/26/2019   [facility]     Bindu Vora, CNP  08/27/19 1104       Bindu Vora CNP  08/27/19 1242

## 2019-08-26 NOTE — ED TRIAGE NOTES
Pt presents today with c/o tinnitus for 3 weeks. States the ringing has gotten worse. Unable to get into PCP for 1 week.

## 2019-08-27 ASSESSMENT — ENCOUNTER SYMPTOMS
CHILLS: 0
LIGHT-HEADEDNESS: 0
ACTIVITY CHANGE: 0
RESPIRATORY NEGATIVE: 1
WEAKNESS: 0
ENDOCRINE NEGATIVE: 1
BRUISES/BLEEDS EASILY: 0
DIZZINESS: 0
HEADACHES: 0
GASTROINTESTINAL NEGATIVE: 1
FEVER: 0
SORE THROAT: 0
EYES NEGATIVE: 1
APPETITE CHANGE: 0
RHINORRHEA: 0
CARDIOVASCULAR NEGATIVE: 1

## 2019-08-27 NOTE — TELEPHONE ENCOUNTER
LM to return call. May take any same day slot open this week with Laury Trivedi. Ashley A. Lechevalier, LPN on 8/27/2019 at 10:44 AM

## 2019-08-27 NOTE — TELEPHONE ENCOUNTER
Patient returned call, He was seen in  and it was suggested he follow up with audiology and ENT for hearing tests etc.. Patient stated he no longer needs to see Laury Trivedi and will follow up as recommended and keep us informed of the outcome with ENT. Ashley A. Lechevalier, LPN on 8/27/2019 at 12:08 PM

## 2019-08-27 NOTE — TELEPHONE ENCOUNTER
This is being addressed in another encounter. See Chart. Ashley A. Lechevalier, LPN on 8/27/2019 at 10:45 AM

## 2019-08-27 NOTE — TELEPHONE ENCOUNTER
Noted.  I assume he is set up with Audiology ENT.?    Laury Trivedi [facility]  [phone]

## 2019-08-28 NOTE — TELEPHONE ENCOUNTER
Referral's are placed and patient is aware they should call to schedule appointments. Ashley A. Lechevalier, LPN on 8/28/2019 at 10:12 AM

## 2019-10-07 PROBLEM — M25.512 ACUTE PAIN OF LEFT SHOULDER: Status: ACTIVE | Noted: 2019-01-31

## 2019-10-07 PROBLEM — M75.42 IMPINGEMENT SYNDROME OF LEFT SHOULDER: Status: ACTIVE | Noted: 2019-01-31

## 2019-11-11 ENCOUNTER — TRANSFERRED RECORDS (OUTPATIENT)
Dept: HEALTH INFORMATION MANAGEMENT | Facility: CLINIC | Age: 54
End: 2019-11-11

## 2020-02-26 ENCOUNTER — TRANSFERRED RECORDS (OUTPATIENT)
Dept: HEALTH INFORMATION MANAGEMENT | Facility: CLINIC | Age: 55
End: 2020-02-26

## 2020-03-09 ENCOUNTER — TRANSFERRED RECORDS (OUTPATIENT)
Dept: HEALTH INFORMATION MANAGEMENT | Facility: CLINIC | Age: 55
End: 2020-03-09

## 2020-05-11 ENCOUNTER — TRANSFERRED RECORDS (OUTPATIENT)
Dept: HEALTH INFORMATION MANAGEMENT | Facility: CLINIC | Age: 55
End: 2020-05-11

## 2020-06-19 NOTE — PROGRESS NOTES
[facility] [place]  [address]  [place] [address]  [phone]  Dept: [phone]      PRE-OP EVALUATION:  Today's date: 2020      Asif Leon (: 1965) presents for pre-operative evaluation assessment as requested by Dr. Hand.  He requires evaluation and anesthesia risk assessment prior to undergoing surgery/procedure for treatment of Chronic knee pain, osteoarthritis.         Proposed Surgery/ Procedure: Left Total Knee Arthroplasty   Date of Surgery/ Procedure: 2020  Time of Surgery/ Procedure: TBD  Hospital/Surgical Facility: [facility]  Primary Physician: Laury Trivedi  Type of Anesthesia Anticipated: to be determined      Patient has a Health Care Directive or Living Will:  YES not on file      1. NO - Do you have a history of heart attack, stroke, stent, bypass or surgery on an artery in the head, neck, heart or legs?  2. NO - Do you ever have any pain or discomfort in your chest?  3. NO - Do you have a history of  Heart Failure?  4. NO - Are you troubled by shortness of breath when: walking on the level, up a slight hill or at night?  5. NO - Do you currently have a cold, bronchitis or other respiratory infection?  6. NO - Do you have a cough, shortness of breath or wheezing?  7. NO - Do you sometimes get pains in the calves of your legs when you walk?  8. NO - Do you or anyone in your family have previous history of blood clots?  9. NO - Do you or does anyone in your family have a serious bleeding problem such as prolonged bleeding following surgeries or cuts?  10. NO - Have you ever had problems with anemia or been told to take iron pills?  11. NO - Have you had any abnormal blood loss such as black, tarry or bloody stools, or abnormal vaginal bleeding?  12. NO - Have you ever had a blood transfusion?  13. NO - Have you or any of your relatives ever had problems with anesthesia?  14. NO - Do you have sleep apnea, excessive snoring or daytime  drowsiness?  15. NO - Do you have any prosthetic heart valves?  16. YES - Do you have prosthetic joints? Right hip        HPI:     HPI related to upcoming procedure: Osteoarthritis of left knee        See problem list for active medical problems.  Problems all longstanding and stable, except as noted/documented.  See ROS for pertinent symptoms related to these conditions.        MEDICAL HISTORY:     Patient Active Problem List    Diagnosis Date Noted     Acute pain of left shoulder 01/31/2019     Priority: Medium     Impingement syndrome of left shoulder 01/31/2019     Priority: Medium     Observed sleep apnea 12/10/2018     Priority: Medium     Sacroiliac joint dysfunction 12/10/2018     Priority: Medium     History of colonic polyps 12/10/2018     Priority: Medium     Vitamin D deficiency 04/25/2018     Priority: Medium     Anxiety 01/15/2018     Priority: Medium     Chronic midline low back pain without sciatica 01/15/2018     Priority: Medium     Primary insomnia 01/15/2018     Priority: Medium        Past Medical History:   Diagnosis Date     Depressive disorder      TGA (transient global amnesia) 01/04/2018    admitted with a 6 hour episode of confusion and anterograde and retrograde amnesia. CT head, CTA head and neck, MRI brain and EEG were negative. Neurology was consulted and felt this to have been an episode of transient global amnesia that can be followed by his PCP with referral to neurology if recurs.        Past Surgical History:   Procedure Laterality Date     AS COLONOSCOPY W BX FORCEP/CAUTERY REMOVAL KENYA/POLYP/LESION N/A 01/01/1999    stomache pain/family hx     COLONOSCOPY  01/01/2011    f/u polyectomy/ family HX crohns     COLONOSCOPY N/A 12/20/2018    Procedure: COLONOSCOPY with Polypectomy;  Surgeon: Yared Vilchis MD;  Location: [facility]     ORTHOPEDIC SURGERY      shoulder surgery     RECESSION RESECTION (REPAIR STRABISMUS) BILATERAL  10/18/2011    Procedure:RECESSION RESECTION (REPAIR  "STRABISMUS) BILATERAL; Strabismus Repair Right; Surgeon:MERCEDEZ CHAUHAN; Location:[facility]     Current Outpatient Medications   Medication Sig Dispense Refill     escitalopram (LEXAPRO) 20 MG tablet TAKE 1 TABLET DAILY 90 tablet 1       OTC products: None, except as noted above      Allergies   Allergen Reactions     No Clinical Screening - See Comments      Seasonal allergies      Latex Allergy: NO    Social History     Tobacco Use     Smoking status: Never Smoker     Smokeless tobacco: Never Used   Substance Use Topics     Alcohol use: Yes     Comment: occasional     History   Drug Use No         REVIEW OF SYSTEMS:   CONSTITUTIONAL: NEGATIVE for fever, chills, change in weight  INTEGUMENTARY/SKIN: NEGATIVE for worrisome rashes, moles or lesions  EYES: NEGATIVE for vision changes or irritation  ENT/MOUTH: NEGATIVE for ear, mouth and throat problems  RESP: NEGATIVE for significant cough or SOB  CV: NEGATIVE for chest pain, palpitations or peripheral edema  GI: NEGATIVE for nausea, abdominal pain, heartburn, or change in bowel habits  : NEGATIVE for frequency, dysuria, or hematuria  MUSCULOSKELETAL:Left knee pain  NEURO: NEGATIVE for weakness, dizziness or paresthesias  ENDOCRINE: NEGATIVE for temperature intolerance, skin/hair changes  HEME: NEGATIVE for bleeding problems  PSYCHIATRIC: NEGATIVE for changes in mood or affect      EXAM:   /78   Pulse 58   Temp 97.2  F (36.2  C)   Ht 1.778 m (5' 10\")   Wt 93.9 kg (207 lb)   SpO2 97%   BMI 29.70 kg/m           GENERAL APPEARANCE: healthy, alert and no distress     EYES: EOMI,  PERRL     HENT: ear canals and TM's normal and nose and mouth without ulcers or lesions     NECK: no adenopathy, no asymmetry, masses, or scars and thyroid normal to palpation     RESP: lungs clear to auscultation - no rales, rhonchi or wheezes     CV: regular rates and rhythm, normal S1 S2, no S3 or S4 and no murmur, click or rub     SKIN: no suspicious lesions or rashes     NEURO: " Normal strength and tone, sensory exam grossly normal, mentation intact and speech normal     PSYCH: mentation appears normal. and affect normal/bright     LYMPHATICS: No cervical adenopathy      DIAGNOSTICS:     EKG attached - NSR      COVID test completed, pending    Results for orders placed or performed in visit on 06/22/20   Lipid Profile (Chol, Trig, HDL, LDL calc)     Status: Abnormal   Result Value Ref Range    Cholesterol 188 <200 mg/dL    Triglycerides 234 (H) <150 mg/dL    HDL Cholesterol 42 >39 mg/dL    LDL Cholesterol Calculated 99 <100 mg/dL    Non HDL Cholesterol 146 (H) <130 mg/dL   CBC with platelets and differential     Status: Abnormal   Result Value Ref Range    WBC 5.6 4.0 - 11.0 10e9/L    RBC Count 4.97 4.4 - 5.9 10e12/L    Hemoglobin 13.8 13.3 - 17.7 g/dL    Hematocrit 40.3 40.0 - 53.0 %    MCV 81 78 - 100 fl    MCH 27.8 26.5 - 33.0 pg    MCHC 34.2 31.5 - 36.5 g/dL    RDW 14.6 10.0 - 15.0 %    Platelet Count 134 (L) 150 - 450 10e9/L    Diff Method Automated Method     % Neutrophils 61.5 %    % Lymphocytes 22.9 %    % Monocytes 11.2 %    % Eosinophils 2.8 %    % Basophils 0.5 %    % Immature Granulocytes 1.1 %    Nucleated RBCs 0 0 /100    Absolute Neutrophil 3.5 1.6 - 8.3 10e9/L    Absolute Lymphocytes 1.3 0.8 - 5.3 10e9/L    Absolute Monocytes 0.6 0.0 - 1.3 10e9/L    Absolute Eosinophils 0.2 0.0 - 0.7 10e9/L    Absolute Basophils 0.0 0.0 - 0.2 10e9/L    Abs Immature Granulocytes 0.1 0 - 0.4 10e9/L    Absolute Nucleated RBC 0.0    Comprehensive metabolic panel (BMP + Alb, Alk Phos, ALT, AST, Total. Bili, TP)     Status: Abnormal   Result Value Ref Range    Sodium 138 133 - 144 mmol/L    Potassium 4.0 3.4 - 5.3 mmol/L    Chloride 109 94 - 109 mmol/L    Carbon Dioxide 26 20 - 32 mmol/L    Anion Gap 3 3 - 14 mmol/L    Glucose 102 (H) 70 - 99 mg/dL    Urea Nitrogen 23 7 - 30 mg/dL    Creatinine 0.87 0.66 - 1.25 mg/dL    GFR Estimate >90 >60 mL/min/[1.73_m2]    GFR Estimate If Black >90 >60  mL/min/[1.73_m2]    Calcium 9.1 8.5 - 10.1 mg/dL    Bilirubin Total 0.7 0.2 - 1.3 mg/dL    Albumin 4.1 3.4 - 5.0 g/dL    Protein Total 7.6 6.8 - 8.8 g/dL    Alkaline Phosphatase 72 40 - 150 U/L    ALT 58 0 - 70 U/L    AST 40 0 - 45 U/L   PSA, screen     Status: None   Result Value Ref Range    PSA 2.40 0 - 4 ug/L         Recent Labs   Lab Test 05/22/19  1000 05/22/19  0932 12/17/14  0902   HGB  --  14.1 13.6   PLT  --  135* 148*   INR 0.98  --   --    NA  --  140  --    POTASSIUM  --  4.2  --    CR  --  1.02  --         IMPRESSION:   Reason for surgery/procedure: Osteoarthritis, Left Knee      The proposed surgical procedure is considered LOW risk.    REVISED CARDIAC RISK INDEX  The patient has the following serious cardiovascular risks for perioperative complications such as (MI, PE, VFib and 3  AV Block):  No serious cardiac risks  INTERPRETATION: 0 risks: Class I (very low risk - 0.4% complication rate)    The patient has the following additional risks for perioperative complications:  No identified additional risks      ICD-10-CM    1. Preop general physical exam  Z01.818 CBC with platelets and differential     Comprehensive metabolic panel (BMP + Alb, Alk Phos, ALT, AST, Total. Bili, TP)     EKG 12-lead complete w/read - (Clinic Performed)     Asymptomatic COVID-19 Virus (Coronavirus) by PCR   2. Primary osteoarthritis of left knee  M17.12 CBC with platelets and differential     Comprehensive metabolic panel (BMP + Alb, Alk Phos, ALT, AST, Total. Bili, TP)     EKG 12-lead complete w/read - (Clinic Performed)     Asymptomatic COVID-19 Virus (Coronavirus) by PCR   3. Lipid screening  Z13.220 Lipid Profile (Chol, Trig, HDL, LDL calc)     Comprehensive metabolic panel (BMP + Alb, Alk Phos, ALT, AST, Total. Bili, TP)   4. Screening for prostate cancer  Z12.5 PSA, screen   5. Anxiety  F41.9 escitalopram (LEXAPRO) 20 MG tablet           RECOMMENDATIONS:       APPROVAL GIVEN to proceed with proposed procedure, without  further diagnostic evaluation       Signed Electronically by: Laury Trivedi CNP    Copy of this evaluation report is provided to requesting physician.    [facility] Preop Guidelines    Revised Cardiac Risk Index

## 2020-06-22 ENCOUNTER — OFFICE VISIT (OUTPATIENT)
Dept: FAMILY MEDICINE | Facility: OTHER | Age: 55
End: 2020-06-22
Attending: NURSE PRACTITIONER
Payer: COMMERCIAL

## 2020-06-22 ENCOUNTER — TELEPHONE (OUTPATIENT)
Dept: FAMILY MEDICINE | Facility: OTHER | Age: 55
End: 2020-06-22

## 2020-06-22 VITALS
TEMPERATURE: 97.2 F | DIASTOLIC BLOOD PRESSURE: 78 MMHG | HEIGHT: 70 IN | OXYGEN SATURATION: 97 % | BODY MASS INDEX: 29.63 KG/M2 | HEART RATE: 58 BPM | WEIGHT: 207 LBS | SYSTOLIC BLOOD PRESSURE: 128 MMHG

## 2020-06-22 DIAGNOSIS — Z12.5 SCREENING FOR PROSTATE CANCER: ICD-10-CM

## 2020-06-22 DIAGNOSIS — F41.9 ANXIETY: ICD-10-CM

## 2020-06-22 DIAGNOSIS — Z01.818 PREOP GENERAL PHYSICAL EXAM: Primary | ICD-10-CM

## 2020-06-22 DIAGNOSIS — Z13.220 LIPID SCREENING: ICD-10-CM

## 2020-06-22 DIAGNOSIS — M17.12 PRIMARY OSTEOARTHRITIS OF LEFT KNEE: ICD-10-CM

## 2020-06-22 LAB
ALBUMIN SERPL-MCNC: 4.1 G/DL (ref 3.4–5)
ALP SERPL-CCNC: 72 U/L (ref 40–150)
ALT SERPL W P-5'-P-CCNC: 58 U/L (ref 0–70)
ANION GAP SERPL CALCULATED.3IONS-SCNC: 3 MMOL/L (ref 3–14)
AST SERPL W P-5'-P-CCNC: 40 U/L (ref 0–45)
BASOPHILS # BLD AUTO: 0 10E9/L (ref 0–0.2)
BASOPHILS NFR BLD AUTO: 0.5 %
BILIRUB SERPL-MCNC: 0.7 MG/DL (ref 0.2–1.3)
BUN SERPL-MCNC: 23 MG/DL (ref 7–30)
CALCIUM SERPL-MCNC: 9.1 MG/DL (ref 8.5–10.1)
CHLORIDE SERPL-SCNC: 109 MMOL/L (ref 94–109)
CHOLEST SERPL-MCNC: 188 MG/DL
CO2 SERPL-SCNC: 26 MMOL/L (ref 20–32)
CREAT SERPL-MCNC: 0.87 MG/DL (ref 0.66–1.25)
DIFFERENTIAL METHOD BLD: ABNORMAL
EOSINOPHIL # BLD AUTO: 0.2 10E9/L (ref 0–0.7)
EOSINOPHIL NFR BLD AUTO: 2.8 %
ERYTHROCYTE [DISTWIDTH] IN BLOOD BY AUTOMATED COUNT: 14.6 % (ref 10–15)
GFR SERPL CREATININE-BSD FRML MDRD: >90 ML/MIN/{1.73_M2}
GLUCOSE SERPL-MCNC: 102 MG/DL (ref 70–99)
HCT VFR BLD AUTO: 40.3 % (ref 40–53)
HDLC SERPL-MCNC: 42 MG/DL
HGB BLD-MCNC: 13.8 G/DL (ref 13.3–17.7)
IMM GRANULOCYTES # BLD: 0.1 10E9/L (ref 0–0.4)
IMM GRANULOCYTES NFR BLD: 1.1 %
LDLC SERPL CALC-MCNC: 99 MG/DL
LYMPHOCYTES # BLD AUTO: 1.3 10E9/L (ref 0.8–5.3)
LYMPHOCYTES NFR BLD AUTO: 22.9 %
MCH RBC QN AUTO: 27.8 PG (ref 26.5–33)
MCHC RBC AUTO-ENTMCNC: 34.2 G/DL (ref 31.5–36.5)
MCV RBC AUTO: 81 FL (ref 78–100)
MONOCYTES # BLD AUTO: 0.6 10E9/L (ref 0–1.3)
MONOCYTES NFR BLD AUTO: 11.2 %
NEUTROPHILS # BLD AUTO: 3.5 10E9/L (ref 1.6–8.3)
NEUTROPHILS NFR BLD AUTO: 61.5 %
NONHDLC SERPL-MCNC: 146 MG/DL
NRBC # BLD AUTO: 0 10*3/UL
NRBC BLD AUTO-RTO: 0 /100
PLATELET # BLD AUTO: 134 10E9/L (ref 150–450)
POTASSIUM SERPL-SCNC: 4 MMOL/L (ref 3.4–5.3)
PROT SERPL-MCNC: 7.6 G/DL (ref 6.8–8.8)
PSA SERPL-ACNC: 2.4 UG/L (ref 0–4)
RBC # BLD AUTO: 4.97 10E12/L (ref 4.4–5.9)
SODIUM SERPL-SCNC: 138 MMOL/L (ref 133–144)
TRIGL SERPL-MCNC: 234 MG/DL
WBC # BLD AUTO: 5.6 10E9/L (ref 4–11)

## 2020-06-22 PROCEDURE — 80061 LIPID PANEL: CPT | Performed by: NURSE PRACTITIONER

## 2020-06-22 PROCEDURE — 36415 COLL VENOUS BLD VENIPUNCTURE: CPT | Performed by: NURSE PRACTITIONER

## 2020-06-22 PROCEDURE — G0103 PSA SCREENING: HCPCS | Performed by: NURSE PRACTITIONER

## 2020-06-22 PROCEDURE — 99000 SPECIMEN HANDLING OFFICE-LAB: CPT | Performed by: NURSE PRACTITIONER

## 2020-06-22 PROCEDURE — 93000 ELECTROCARDIOGRAM COMPLETE: CPT | Performed by: INTERNAL MEDICINE

## 2020-06-22 PROCEDURE — 80053 COMPREHEN METABOLIC PANEL: CPT | Performed by: NURSE PRACTITIONER

## 2020-06-22 PROCEDURE — 99214 OFFICE O/P EST MOD 30 MIN: CPT | Mod: 25 | Performed by: NURSE PRACTITIONER

## 2020-06-22 PROCEDURE — 85025 COMPLETE CBC W/AUTO DIFF WBC: CPT | Performed by: NURSE PRACTITIONER

## 2020-06-22 PROCEDURE — U0003 INFECTIOUS AGENT DETECTION BY NUCLEIC ACID (DNA OR RNA); SEVERE ACUTE RESPIRATORY SYNDROME CORONAVIRUS 2 (SARS-COV-2) (CORONAVIRUS DISEASE [COVID-19]), AMPLIFIED PROBE TECHNIQUE, MAKING USE OF HIGH THROUGHPUT TECHNOLOGIES AS DESCRIBED BY CMS-2020-01-R: HCPCS | Mod: 90 | Performed by: NURSE PRACTITIONER

## 2020-06-22 RX ORDER — ESCITALOPRAM OXALATE 20 MG/1
TABLET ORAL
Qty: 90 TABLET | Refills: 1 | Status: SHIPPED | OUTPATIENT
Start: 2020-06-22 | End: 2021-07-09

## 2020-06-22 ASSESSMENT — ANXIETY QUESTIONNAIRES
2. NOT BEING ABLE TO STOP OR CONTROL WORRYING: NOT AT ALL
3. WORRYING TOO MUCH ABOUT DIFFERENT THINGS: NOT AT ALL
5. BEING SO RESTLESS THAT IT IS HARD TO SIT STILL: NOT AT ALL
1. FEELING NERVOUS, ANXIOUS, OR ON EDGE: NOT AT ALL
7. FEELING AFRAID AS IF SOMETHING AWFUL MIGHT HAPPEN: NOT AT ALL
4. TROUBLE RELAXING: NOT AT ALL
6. BECOMING EASILY ANNOYED OR IRRITABLE: NOT AT ALL
GAD7 TOTAL SCORE: 0

## 2020-06-22 ASSESSMENT — MIFFLIN-ST. JEOR: SCORE: 1775.2

## 2020-06-22 ASSESSMENT — PATIENT HEALTH QUESTIONNAIRE - PHQ9: SUM OF ALL RESPONSES TO PHQ QUESTIONS 1-9: 0

## 2020-06-22 ASSESSMENT — PAIN SCALES - GENERAL: PAINLEVEL: NO PAIN (0)

## 2020-06-22 NOTE — NURSING NOTE
"Chief Complaint   Patient presents with     Pre-Op Exam       Initial /78   Pulse 58   Temp 97.2  F (36.2  C)   Ht 1.778 m (5' 10\")   Wt 93.9 kg (207 lb)   SpO2 97%   BMI 29.70 kg/m   Estimated body mass index is 29.7 kg/m  as calculated from the following:    Height as of this encounter: 1.778 m (5' 10\").    Weight as of this encounter: 93.9 kg (207 lb).  Medication Reconciliation: complete  Jan Armando LPN  "

## 2020-06-22 NOTE — LETTER
June 26, 2020      Asif aKrimibabatunde  [address] [place] [phone]        Dear ,    We are writing to inform you of your test results.    normal    Resulted Orders   Lipid Profile (Chol, Trig, HDL, LDL calc)   Result Value Ref Range    Cholesterol 188 <200 mg/dL    Triglycerides 234 (H) <150 mg/dL      Comment:      Borderline high:  150-199 mg/dl  High:             200-499 mg/dl  Very high:       >499 mg/dl  Fasting specimen      HDL Cholesterol 42 >39 mg/dL    LDL Cholesterol Calculated 99 <100 mg/dL      Comment:      Desirable:       <100 mg/dl    Non HDL Cholesterol 146 (H) <130 mg/dL      Comment:      Above Desirable:  130-159 mg/dl  Borderline high:  160-189 mg/dl  High:             190-219 mg/dl  Very high:       >219 mg/dl     CBC with platelets and differential   Result Value Ref Range    WBC 5.6 4.0 - 11.0 10e9/L    RBC Count 4.97 4.4 - 5.9 10e12/L    Hemoglobin 13.8 13.3 - 17.7 g/dL    Hematocrit 40.3 40.0 - 53.0 %    MCV 81 78 - 100 fl    MCH 27.8 26.5 - 33.0 pg    MCHC 34.2 31.5 - 36.5 g/dL    RDW 14.6 10.0 - 15.0 %    Platelet Count 134 (L) 150 - 450 10e9/L    Diff Method Automated Method     % Neutrophils 61.5 %    % Lymphocytes 22.9 %    % Monocytes 11.2 %    % Eosinophils 2.8 %    % Basophils 0.5 %    % Immature Granulocytes 1.1 %    Nucleated RBCs 0 0 /100    Absolute Neutrophil 3.5 1.6 - 8.3 10e9/L    Absolute Lymphocytes 1.3 0.8 - 5.3 10e9/L    Absolute Monocytes 0.6 0.0 - 1.3 10e9/L    Absolute Eosinophils 0.2 0.0 - 0.7 10e9/L    Absolute Basophils 0.0 0.0 - 0.2 10e9/L    Abs Immature Granulocytes 0.1 0 - 0.4 10e9/L    Absolute Nucleated RBC 0.0    Comprehensive metabolic panel (BMP + Alb, Alk Phos, ALT, AST, Total. Bili, TP)   Result Value Ref Range    Sodium 138 133 - 144 mmol/L    Potassium 4.0 3.4 - 5.3 mmol/L    Chloride 109 94 - 109 mmol/L    Carbon Dioxide 26 20 - 32 mmol/L    Anion Gap 3 3 - 14 mmol/L    Glucose 102 (H) 70 - 99 mg/dL      Comment:      Fasting  specimen    Urea Nitrogen 23 7 - 30 mg/dL    Creatinine 0.87 0.66 - 1.25 mg/dL    GFR Estimate >90 >60 mL/min/[1.73_m2]      Comment:      Non  GFR Calc  Starting 12/18/2018, serum creatinine based estimated GFR (eGFR) will be   calculated using the Chronic Kidney Disease Epidemiology Collaboration   (CKD-EPI) equation.      GFR Estimate If Black >90 >60 mL/min/[1.73_m2]      Comment:       GFR Calc  Starting 12/18/2018, serum creatinine based estimated GFR (eGFR) will be   calculated using the Chronic Kidney Disease Epidemiology Collaboration   (CKD-EPI) equation.      Calcium 9.1 8.5 - 10.1 mg/dL    Bilirubin Total 0.7 0.2 - 1.3 mg/dL    Albumin 4.1 3.4 - 5.0 g/dL    Protein Total 7.6 6.8 - 8.8 g/dL    Alkaline Phosphatase 72 40 - 150 U/L    ALT 58 0 - 70 U/L    AST 40 0 - 45 U/L   Asymptomatic COVID-19 Virus (Coronavirus) by PCR   Result Value Ref Range    COVID-19 Virus PCR to [facility] - Source Nasopharyngeal     COVID-19 Virus PCR to [facility] - Result Not Detected       Comment:      Collection of multiple specimens from the same patient may be necessary to   detect the virus. The possibility of a false negative should be considered if   the patient's recent exposure or clinical presentation suggests 2019 nCOV   infection and diagnostic tests for other causes of illness are negative.   Repeat testing may be considered in this setting.  Viral RNA was extracted via a validated method and subsequently underwent   single step reverse transcriptase-real time polymerase chain reaction using   primers to the CDC specified N1,N2 gene targets of CoV2 and human RNP as an   internal control.  A negative result does not rule out the presence of real-time PCR inhibitors   in the specimen or COVID-19 RNA in concentrations below the limit of detection   of the assay. The possibility of a false negative should be considered if the   patients recent exposure or clinical presentation suggests  COVID-19.   Additional testing or repeat testing requires consultation with the   laborator  y.  Nasopharyngeal specimen is the preferred choice for swab-based SARS CoV2   testing. When collection of a nasopharyngeal swab is not possible the   following are acceptable alternatives:  an oropharyngeal (OP) specimen collected by a healthcare professional, or a   nasal mid-turbinate (NMT) swab collected by a healthcare professional or by   onsite self-collection (using a flocked tapered swab), or an anterior nares   specimen collected by a healthcare professional or by onsite self-collection   (using a round foam swab). (Centers for Disease Control)  Testing performed by [facility], Room [address]210, [address] [place] [address]. This test was developed and its   performance characteristics determined by the [facility]. It has not been cleared or approved by the FDA.  The laboratory is regulated under the Clinical Laboratory Improvement   Amendments of 1988 (CLIA-88) as qualified to perform high-complexity testin  g.   This test is used for clinical purposes. It should not be regarded as   investigational or for research.     PSA, screen   Result Value Ref Range    PSA 2.40 0 - 4 ug/L      Comment:      Assay Method:  Chemiluminescence using Siemens Vista analyzer       If you have any questions or concerns, please call the clinic at the number listed above.       Sincerely,        Laury Trivedi, CNP

## 2020-06-23 LAB
SARS-COV-2 RNA SPEC QL NAA+PROBE: NOT DETECTED
SPECIMEN SOURCE: NORMAL

## 2020-06-23 ASSESSMENT — ANXIETY QUESTIONNAIRES: GAD7 TOTAL SCORE: 0

## 2020-07-09 ENCOUNTER — TRANSFERRED RECORDS (OUTPATIENT)
Dept: HEALTH INFORMATION MANAGEMENT | Facility: CLINIC | Age: 55
End: 2020-07-09

## 2020-08-06 ENCOUNTER — TRANSFERRED RECORDS (OUTPATIENT)
Dept: HEALTH INFORMATION MANAGEMENT | Facility: CLINIC | Age: 55
End: 2020-08-06

## 2020-11-02 ENCOUNTER — OFFICE VISIT (OUTPATIENT)
Dept: FAMILY MEDICINE | Facility: OTHER | Age: 55
End: 2020-11-02
Attending: NURSE PRACTITIONER
Payer: COMMERCIAL

## 2020-11-02 ENCOUNTER — NURSE TRIAGE (OUTPATIENT)
Dept: FAMILY MEDICINE | Facility: OTHER | Age: 55
End: 2020-11-02

## 2020-11-02 DIAGNOSIS — Z20.822 COVID-19 RULED OUT: Primary | ICD-10-CM

## 2020-11-02 DIAGNOSIS — Z20.822 EXPOSURE TO 2019 NOVEL CORONAVIRUS: Primary | ICD-10-CM

## 2020-11-02 DIAGNOSIS — Z20.822 EXPOSURE TO 2019 NOVEL CORONAVIRUS: ICD-10-CM

## 2020-11-02 PROCEDURE — U0003 INFECTIOUS AGENT DETECTION BY NUCLEIC ACID (DNA OR RNA); SEVERE ACUTE RESPIRATORY SYNDROME CORONAVIRUS 2 (SARS-COV-2) (CORONAVIRUS DISEASE [COVID-19]), AMPLIFIED PROBE TECHNIQUE, MAKING USE OF HIGH THROUGHPUT TECHNOLOGIES AS DESCRIBED BY CMS-2020-01-R: HCPCS | Performed by: FAMILY MEDICINE

## 2020-11-02 NOTE — TELEPHONE ENCOUNTER
COVID 19 Nurse Triage Plan/Patient Instructions    Please be aware that novel coronavirus (COVID-19) may be circulating in the community. If you develop symptoms such as fever, cough, or SOB or if you have concerns about the presence of another infection including coronavirus (COVID-19), please contact your health care provider or visit [email or web address].     Disposition/Instructions    Home care recommended. Follow home care protocol based instructions.    Thank you for taking steps to prevent the spread of this virus.  o Limit your contact with others.  o Wear a simple mask to cover your cough.  o Wash your hands well and often.    Resources    [facility]: About COVID-19: [email or web address]    CDC: What to Do If You're Sick: www.cdc.gov/coronavirus/2019-ncov/about/steps-when-sick.html    CDC: Ending Home Isolation: www.cdc.gov/coronavirus/2019-ncov/hcp/disposition-in-home-patients.html     CDC: Caring for Someone: www.cdc.gov/coronavirus/2019-ncov/if-you-are-sick/care-for-someone.html     [facility]: Interim Guidance for Hospital Discharge to Home: www.[email or web address].[email or web address]/diseases/coronavirus/hcp/hospdischarge.pdf    [facility] clinical trials (COVID-19 research studies): clinicalaffairs.[facility]-clinical-trials     Below are the COVID-19 hotlines at the [place] Department of [facility] ([facility]). Interpreters are available.   o For health questions: Call 551-479-1863 or 1-637.479.1862 (7 a.m. to 7 p.m.)  o For questions about schools and childcare: Call 901-935-6809 or 1-705.316.8380 (7 a.m. to 7 p.m.)                     Reason for Disposition    [1] COVID-19 EXPOSURE (Close Contact) AND [2] within last 14 days BUT [3] NO symptoms    Additional Information    Negative: COVID-19 has been diagnosed by a healthcare provider (HCP)    Negative: COVID-19 lab test positive    Negative: [1] Symptoms of COVID-19 (e.g., cough, fever, SOB, or others) AND [2] lives in an area with community spread     "Negative: [1] Symptoms of COVID-19 (e.g., cough, fever, SOB, or others) AND [2] within 14 days of EXPOSURE (close contact) with diagnosed or suspected COVID-19 patient    Negative: [1] Symptoms of COVID-19 (e.g., cough, fever, SOB, or others) AND [2] within 14 days of travel from high-risk area for COVID-19 community spread (identified by CDC)    Negative: [1] Difficulty breathing (shortness of breath) occurs AND [2] onset > 14 days after COVID-19 EXPOSURE (Close Contact) AND [3] no community spread where patient lives    Negative: [1] Dry cough occurs AND [2] onset > 14 days after COVID-19 EXPOSURE AND [3] no community spread where patient lives    Negative: [1] Wet cough (i.e., white-yellow, yellow, green, or dianna colored sputum) AND [2] onset > 14 days after COVID-19 EXPOSURE AND [3] no community spread where patient lives    Negative: [1] Common cold symptoms AND [2] onset > 14 days after COVID-19 EXPOSURE AND [3] no community spread where patient lives    Negative: [1] COVID-19 EXPOSURE (Close Contact) within last 14 days AND [2] needs COVID-19 lab test to return to work AND [3] NO symptoms    Negative: [1] COVID-19 EXPOSURE (Close Contact) within last 14 days AND [2] exposed person is a healthcare worker who was NOT using all recommended personal protective equipment (i.e., a respirator-N95 mask, eye protection, gloves, and gown) AND [3] NO symptoms    Answer Assessment - Initial Assessment Questions  1. CLOSE CONTACT: \"Who is the person with the confirmed or suspected COVID-19 infection that you were exposed to?\"      coworker  2. PLACE of CONTACT: \"Where were you when you were exposed to COVID-19?\" (e.g., home, school, medical waiting room; which city?)      work  3. TYPE of CONTACT: \"How much contact was there?\" (e.g., sitting next to, live in same house, work in same office, same building)      Working equipment on together   4. DURATION of CONTACT: \"How long were you in contact with the COVID-19 " "patient?\" (e.g., a few seconds, passed by person, a few minutes, live with the patient)      3 days 6 hours a day  5. DATE of CONTACT: \"When did you have contact with a COVID-19 patient?\" (e.g., how many days ago)      10/28/20  6. TRAVEL: \"Have you traveled out of the country recently?\" If so, \"When and where?\"      * Also ask about out-of-state travel, since the [facility] has identified some high-risk cities for community spread in the .      * Note: Travel becomes less relevant if there is widespread community transmission where the patient lives.      no  7. COMMUNITY SPREAD: \"Are there lots of cases of COVID-19 (community spread) where you live?\" (See public health department website, if unsure)        yes  8. SYMPTOMS: \"Do you have any symptoms?\" (e.g., fever, cough, breathing difficulty)      no  9. PREGNANCY OR POSTPARTUM: \"Is there any chance you are pregnant?\" \"When was your last menstrual period?\" \"Did you deliver in the last 2 weeks?\"      n/a  10. HIGH RISK: \"Do you have any heart or lung problems? Do you have a weak immune system?\" (e.g., CHF, COPD, asthma, HIV positive, chemotherapy, renal failure, diabetes mellitus, sickle cell anemia)        no    Protocols used: CORONAVIRUS (COVID-19) EXPOSURE-A- 8.4.20      "

## 2020-11-03 LAB
SARS-COV-2 RNA SPEC QL NAA+PROBE: NOT DETECTED
SPECIMEN SOURCE: NORMAL

## 2021-02-23 ENCOUNTER — HOSPITAL ENCOUNTER (EMERGENCY)
Facility: HOSPITAL | Age: 56
Discharge: HOME | End: 2021-02-23
Attending: NURSE PRACTITIONER | Admitting: NURSE PRACTITIONER
Payer: COMMERCIAL

## 2021-02-23 VITALS
OXYGEN SATURATION: 97 % | SYSTOLIC BLOOD PRESSURE: 159 MMHG | HEART RATE: 98 BPM | DIASTOLIC BLOOD PRESSURE: 103 MMHG | TEMPERATURE: 97.8 F | RESPIRATION RATE: 16 BRPM

## 2021-02-23 DIAGNOSIS — L02.212 ABSCESS OF BACK: ICD-10-CM

## 2021-02-23 PROCEDURE — 10060 I&D ABSCESS SIMPLE/SINGLE: CPT | Performed by: NURSE PRACTITIONER

## 2021-02-23 PROCEDURE — 87070 CULTURE OTHR SPECIMN AEROBIC: CPT | Performed by: NURSE PRACTITIONER

## 2021-02-23 PROCEDURE — 87077 CULTURE AEROBIC IDENTIFY: CPT | Performed by: NURSE PRACTITIONER

## 2021-02-23 PROCEDURE — 999N000104 HC STATISTIC NO CHARGE

## 2021-02-23 PROCEDURE — 87205 SMEAR GRAM STAIN: CPT | Performed by: NURSE PRACTITIONER

## 2021-02-23 PROCEDURE — 10060 I&D ABSCESS SIMPLE/SINGLE: CPT

## 2021-02-23 RX ORDER — SULFAMETHOXAZOLE/TRIMETHOPRIM 800-160 MG
1 TABLET ORAL 2 TIMES DAILY
Qty: 14 TABLET | Refills: 0 | Status: SHIPPED | OUTPATIENT
Start: 2021-02-23 | End: 2021-03-02

## 2021-02-23 ASSESSMENT — ENCOUNTER SYMPTOMS
ACTIVITY CHANGE: 1
CHILLS: 0
LIGHT-HEADEDNESS: 0
FEVER: 0
DIZZINESS: 0
COLOR CHANGE: 1
SHORTNESS OF BREATH: 0
ROS SKIN COMMENTS: ABSCESS
HEADACHES: 0
NAUSEA: 0
WOUND: 1
VOMITING: 0

## 2021-02-23 NOTE — ED TRIAGE NOTES
"Patient presents with complaints of a cyst to the middle of his back.  Notes that it has been there for awhile; last few days it has gotten bigger; painful.  Today at work it \"popped\" and thick white stuff came out of cyst.  Patient notes he has artifical joints and thought he should come in for ABX.  "

## 2021-02-24 ENCOUNTER — TELEPHONE (OUTPATIENT)
Dept: FAMILY MEDICINE | Facility: OTHER | Age: 56
End: 2021-02-24

## 2021-02-24 LAB
GRAM STN SPEC: ABNORMAL
SPECIMEN SOURCE: ABNORMAL

## 2021-02-24 NOTE — TELEPHONE ENCOUNTER
Pt calling and was in UC for back abscess last night. He has taken two doses of Bactrim.    He read the  Side effects and says to consult MD if you have a certain blood disorder.    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    Recommendations?    Call back [phone]    Kenyatta Bolton RN

## 2021-02-24 NOTE — DISCHARGE INSTRUCTIONS
daily dressing changes for the next 48 to 72 hours. You may shower . May pull the packing out after 24 hours. If you have increased pain, redness at wound site, fevers, or abnormal drainage (purulent/pus) you need to see your primary care provider or return to Urgent Care/ER immediately. Acetaminophen/tylenol  or ibuprofen for pain. Complete all antibiotics even if feeling better.  Take antibiotics with food unless instructed otherwise. Yogurt or probiotics may help decrease stomach upset and diarrhea.

## 2021-02-24 NOTE — ED PROVIDER NOTES
History     Chief Complaint   Patient presents with     Cyst     HPI  Asif Leon is a 55 year old male who presents with complaints of a draining lump on his back that has been there for the past month. The  last two days it has become more tender and then broke open today is draining. It saturated his shirt and the drainage smells. History cysts removed from the right side of neck. Denies fevers, chills, nausea, vomiting, diarrhea, and shortness of breath.    Allergies:  Allergies   Allergen Reactions     No Clinical Screening - See Comments      Seasonal allergies       Problem List:    Patient Active Problem List    Diagnosis Date Noted     Acute pain of left shoulder 01/31/2019     Priority: Medium     Impingement syndrome of left shoulder 01/31/2019     Priority: Medium     Observed sleep apnea 12/10/2018     Priority: Medium     Sacroiliac joint dysfunction 12/10/2018     Priority: Medium     History of colonic polyps 12/10/2018     Priority: Medium     Vitamin D deficiency 04/25/2018     Priority: Medium     Anxiety 01/15/2018     Priority: Medium     Chronic midline low back pain without sciatica 01/15/2018     Priority: Medium     Primary insomnia 01/15/2018     Priority: Medium        Past Medical History:    Past Medical History:   Diagnosis Date     Depressive disorder      TGA (transient global amnesia) 01/04/2018       Past Surgical History:    Past Surgical History:   Procedure Laterality Date     AS COLONOSCOPY W BX FORCEP/CAUTERY REMOVAL KENYA/POLYP/LESION N/A 01/01/1999    stomache pain/family hx     COLONOSCOPY  01/01/2011    f/u polyectomy/ family HX crohns     COLONOSCOPY N/A 12/20/2018    Procedure: COLONOSCOPY with Polypectomy;  Surgeon: Yared Vilchis MD;  Location: [facility]     ORTHOPEDIC SURGERY      shoulder surgery     RECESSION RESECTION (REPAIR STRABISMUS) BILATERAL  10/18/2011    Procedure:RECESSION RESECTION (REPAIR STRABISMUS) BILATERAL; Strabismus Repair Right;  Surgeon:MERCDEEZ CHAUHAN; Location:[facility]       Family History:    Family History   Problem Relation Age of Onset     Cancer Mother         Brain - Cause of Death     Crohn's Disease Father         Cause of Death     Crohn's Disease Sister        Social History:  Marital Status:   [2]  Social History     Tobacco Use     Smoking status: Never Smoker     Smokeless tobacco: Never Used   Substance Use Topics     Alcohol use: Yes     Comment: occasional     Drug use: No        Medications:    sulfamethoxazole-trimethoprim (BACTRIM DS) 800-160 MG tablet  escitalopram (LEXAPRO) 20 MG tablet          Review of Systems   Constitutional: Positive for activity change. Negative for chills and fever.   Respiratory: Negative for shortness of breath.    Gastrointestinal: Negative for nausea and vomiting.   Skin: Positive for color change and wound.        abscess   Neurological: Negative for dizziness, light-headedness and headaches.       Physical Exam   BP: (!) 159/103  Pulse: 98  Temp: 97.8  F (36.6  C)  Resp: 16  SpO2: 97 %      Physical Exam  Vitals signs and nursing note reviewed.   Constitutional:       General: He is in acute distress.      Appearance: He is normal weight.   Cardiovascular:      Rate and Rhythm: Normal rate.   Pulmonary:      Effort: Pulmonary effort is normal.   Musculoskeletal:         General: Swelling and tenderness present.   Skin:     General: Skin is warm and dry.      Capillary Refill: Capillary refill takes less than 2 seconds.      Findings: Abscess and erythema present. No bruising.          Neurological:      Mental Status: He is alert and oriented to person, place, and time.   Psychiatric:         Behavior: Behavior normal.         ED Course        [facility]    PROCEDURE: -Incision/Drainage    Date/Time: 2/24/2021 4:10 PM  Performed by: Fatuma Loza CNP  Authorized by: Fatuma Loza CNP       LOCATION:      Type:  Abscess    Size:  2.5    Location:  Trunk    " Trunk location:  Back    PRE-PROCEDURE DETAILS:     Skin preparation:  Chloraprep    ANESTHESIA (see MAR for exact dosages):     Anesthesia method:  Local infiltration    Local anesthetic:  Lidocaine 2% WITH epi    PROCEDURE TYPE:     Complexity:  Simple    PROCEDURE DETAILS:     Needle aspiration: no      Incision types:  Single straight and stab incision    Incision depth:  Dermal    Scalpel blade:  11    Wound management:  Probed and deloculated and irrigated with saline    Drainage:  Purulent    Drainage amount:  Copious    Wound treatment:  Wound left open and drain placed    Packing materials:  1/4 in iodoform gauze and 1/4 in gauze    Amount 1/4\":  6  Post-procedure details:     PROCEDURE   Patient Tolerance:  Patient tolerated the procedure well with no immediate complications  Describe Procedure: Consent obtained, time out performed. Wound culture obtained and sent to lab. Wound was draining. wound cleaned with cloraprep and 4 ml of 2% w/o epi injected into the edges and center of the wound. The area was then prepped in sterile fashion. # 11 blade used to make 1/4th inch incision. Copious amounts of thick purulent secretions extracted from wound with gentle pressure. Wound probed for loculations. Irrigated with 20 ml NS. Six inches of 1/4th inch packing placed into wound. Bleeding controlled. Dressing applied. Neurovascular status intact before and after procedure.               No results found for this or any previous visit (from the past 24 hour(s)).    Medications - No data to display    Assessments & Plan (with Medical Decision Making)     I have reviewed the nursing notes.    I have reviewed the findings, diagnosis, plan and need for follow up with the patient.  (L02.976) Abscess of back  Comment: 55 year old male who presents with complaints of a draining lump on his back that has been there for the past month. The  last two days it has become more tender and then broke open today is draining. It " saturated his shirt and the drainage smells. History cysts removed from the right side of neck. Denies fevers, chills, nausea, vomiting, diarrhea, and shortness of breath.    MDM: 2.5 cm abscess center and left of spinal column, grossly enlarged and erythematous    See procedure note for incision and drainage of abscess.    Discussed possible development of spinal abscess (because of location) if he developed further abscess and does not have them treated for extended periods of time.    Plan: bactrim bid for seven days. Education provided and/or discussed for this/these medication and for I&D of abscess.    daily dressing changes for the next 48 to 72 hours. You may shower . May pull the packing out after 24 hours. If you have increased pain, redness at wound site, fevers, or abnormal drainage (purulent/pus) you need to see your primary care provider or return to Urgent Care/ER immediately. Acetaminophen/tylenol  or ibuprofen for pain. Complete all antibiotics even if feeling better.  Take antibiotics with food unless instructed otherwise. Yogurt or probiotics may help decrease stomach upset and diarrhea.    These discharge instructions and medications were reviewed with him and understanding verbalized.    Discharge Medication List as of 2/23/2021  6:38 PM      START taking these medications    Details   sulfamethoxazole-trimethoprim (BACTRIM DS) 800-160 MG tablet Take 1 tablet by mouth 2 times daily for 7 days, Disp-14 tablet, R-0, E-Prescribe             Final diagnoses:   Abscess of back       2/23/2021   [facility]       Fatuma Loza, CNP  02/24/21 [id number]

## 2021-02-24 NOTE — ED TRIAGE NOTES
Pt is here with c/o abscess on the left upper back   ongoing awhile   pt reported recently today started draining   Pt reports slightly tender, actively draining

## 2021-02-27 LAB
BACTERIA SPEC CULT: ABNORMAL
BACTERIA SPEC CULT: ABNORMAL
SPECIMEN SOURCE: ABNORMAL

## 2021-03-01 ENCOUNTER — TRANSFERRED RECORDS (OUTPATIENT)
Dept: HEALTH INFORMATION MANAGEMENT | Facility: CLINIC | Age: 56
End: 2021-03-01

## 2021-03-16 ENCOUNTER — TRANSFERRED RECORDS (OUTPATIENT)
Dept: HEALTH INFORMATION MANAGEMENT | Facility: CLINIC | Age: 56
End: 2021-03-16

## 2021-04-12 ENCOUNTER — TRANSFERRED RECORDS (OUTPATIENT)
Dept: HEALTH INFORMATION MANAGEMENT | Facility: CLINIC | Age: 56
End: 2021-04-12

## 2021-05-17 ENCOUNTER — OFFICE VISIT (OUTPATIENT)
Dept: FAMILY MEDICINE | Facility: OTHER | Age: 56
End: 2021-05-17
Attending: NURSE PRACTITIONER
Payer: COMMERCIAL

## 2021-05-17 ENCOUNTER — NURSE TRIAGE (OUTPATIENT)
Dept: FAMILY MEDICINE | Facility: OTHER | Age: 56
End: 2021-05-17

## 2021-05-17 VITALS
HEART RATE: 104 BPM | DIASTOLIC BLOOD PRESSURE: 92 MMHG | HEIGHT: 70 IN | BODY MASS INDEX: 30.84 KG/M2 | TEMPERATURE: 100.7 F | OXYGEN SATURATION: 96 % | SYSTOLIC BLOOD PRESSURE: 140 MMHG | WEIGHT: 215.4 LBS | RESPIRATION RATE: 14 BRPM

## 2021-05-17 DIAGNOSIS — R35.89 POLYURIA: ICD-10-CM

## 2021-05-17 DIAGNOSIS — Z20.822 SUSPECTED 2019 NOVEL CORONAVIRUS INFECTION: Primary | ICD-10-CM

## 2021-05-17 LAB
ALBUMIN UR-MCNC: NEGATIVE MG/DL
APPEARANCE UR: CLEAR
BACTERIA #/AREA URNS HPF: ABNORMAL /HPF
BASOPHILS # BLD AUTO: 0 10E9/L (ref 0–0.2)
BASOPHILS NFR BLD AUTO: 0.2 %
BILIRUB UR QL STRIP: NEGATIVE
COLOR UR AUTO: YELLOW
DIFFERENTIAL METHOD BLD: ABNORMAL
EOSINOPHIL # BLD AUTO: 0 10E9/L (ref 0–0.7)
EOSINOPHIL NFR BLD AUTO: 0.7 %
ERYTHROCYTE [DISTWIDTH] IN BLOOD BY AUTOMATED COUNT: 15.2 % (ref 10–15)
GLUCOSE UR STRIP-MCNC: NEGATIVE MG/DL
HCT VFR BLD AUTO: 40.6 % (ref 40–53)
HGB BLD-MCNC: 14 G/DL (ref 13.3–17.7)
HGB UR QL STRIP: ABNORMAL
KETONES UR STRIP-MCNC: NEGATIVE MG/DL
LEUKOCYTE ESTERASE UR QL STRIP: NEGATIVE
LYMPHOCYTES # BLD AUTO: 0.6 10E9/L (ref 0.8–5.3)
LYMPHOCYTES NFR BLD AUTO: 14.4 %
MCH RBC QN AUTO: 27.7 PG (ref 26.5–33)
MCHC RBC AUTO-ENTMCNC: 34.5 G/DL (ref 31.5–36.5)
MCV RBC AUTO: 80 FL (ref 78–100)
MONOCYTES # BLD AUTO: 0.9 10E9/L (ref 0–1.3)
MONOCYTES NFR BLD AUTO: 21.1 %
NEUTROPHILS # BLD AUTO: 2.7 10E9/L (ref 1.6–8.3)
NEUTROPHILS NFR BLD AUTO: 63.6 %
NITRATE UR QL: NEGATIVE
NON-SQ EPI CELLS #/AREA URNS LPF: ABNORMAL /LPF
PH UR STRIP: 5.5 PH (ref 5–7)
PLATELET # BLD AUTO: 123 10E9/L (ref 150–450)
RBC # BLD AUTO: 5.05 10E12/L (ref 4.4–5.9)
RBC #/AREA URNS AUTO: ABNORMAL /HPF
SOURCE: ABNORMAL
SP GR UR STRIP: >1.03 (ref 1–1.03)
UROBILINOGEN UR STRIP-ACNC: 0.2 EU/DL (ref 0.2–1)
WBC # BLD AUTO: 4.3 10E9/L (ref 4–11)
WBC #/AREA URNS AUTO: ABNORMAL /HPF

## 2021-05-17 PROCEDURE — 36415 COLL VENOUS BLD VENIPUNCTURE: CPT | Performed by: NURSE PRACTITIONER

## 2021-05-17 PROCEDURE — 81001 URINALYSIS AUTO W/SCOPE: CPT | Performed by: NURSE PRACTITIONER

## 2021-05-17 PROCEDURE — U0005 INFEC AGEN DETEC AMPLI PROBE: HCPCS | Performed by: NURSE PRACTITIONER

## 2021-05-17 PROCEDURE — 80053 COMPREHEN METABOLIC PANEL: CPT | Performed by: NURSE PRACTITIONER

## 2021-05-17 PROCEDURE — U0003 INFECTIOUS AGENT DETECTION BY NUCLEIC ACID (DNA OR RNA); SEVERE ACUTE RESPIRATORY SYNDROME CORONAVIRUS 2 (SARS-COV-2) (CORONAVIRUS DISEASE [COVID-19]), AMPLIFIED PROBE TECHNIQUE, MAKING USE OF HIGH THROUGHPUT TECHNOLOGIES AS DESCRIBED BY CMS-2020-01-R: HCPCS | Performed by: NURSE PRACTITIONER

## 2021-05-17 PROCEDURE — 99214 OFFICE O/P EST MOD 30 MIN: CPT | Performed by: NURSE PRACTITIONER

## 2021-05-17 PROCEDURE — 85025 COMPLETE CBC W/AUTO DIFF WBC: CPT | Performed by: NURSE PRACTITIONER

## 2021-05-17 RX ORDER — PSEUDOEPHED/ACETAMINOPH/DIPHEN 30MG-500MG
TABLET ORAL
COMMUNITY
Start: 2020-11-30

## 2021-05-17 ASSESSMENT — MIFFLIN-ST. JEOR: SCORE: 1808.3

## 2021-05-17 ASSESSMENT — PAIN SCALES - GENERAL: PAINLEVEL: SEVERE PAIN (7)

## 2021-05-17 NOTE — PATIENT INSTRUCTIONS
"Start Vitamin D3 5000 international unit(s) daily given your history of vitamin D deficiency.  May also start zinc and vitamin C (over the counter dosages). These are are optional.     Patient Education   After Your COVID-19 (Coronavirus) Test  You have been tested for COVID-19 (coronavirus).   If you'll have surgery in the next few days, we'll let you know ahead of time if you have the virus. Please call your surgeon's office with any questions.  For all other patients: Results are usually available in [employer] within 2 to 3 days.   If you do not have a [employer] account, you'll get a letter in the mail in about 7 to 10 days.   [employer] is often the fastest way to get test results. Please sign up if you do not already have a [employer] account. See the handout Getting COVID-19 Test Results in [employer] for help.  What if my test result is positive?  If your test is positive and you have not viewed your result in [employer], you'll get a phone call with your result. (A positive test means that you have the virus.)     Follow the tips under \"How do I self-isolate?\" below for 10 days (20 days if you have a weak immune system).    You don't need to be retested for COVID-19 before going back to school or work. As long as you're fever-free and feeling better, you can go back to school, work and other activities after waiting the 10 or 20 days.  What if I have questions after I get my results?  If you have questions about your results, please visit our testing website at [email or web address].   After 7 to 10 days, if you have not gotten your results:     Call [phone] ([phone]) and ask to speak with our COVID-19 results team.    If you're being treated at an infusion center: Call your infusion center directly.  What are the symptoms of COVID-19?  Cough, fever and trouble breathing are the most common signs of COVID-19.  Other symptoms can include new headaches, new muscle or body aches, new " "and unexplained fatigue (feeling very tired), chills, sore throat, congestion (stuffy or runny nose), diarrhea (loose poop), loss of taste or smell, belly pain, and nausea or vomiting (feeling sick to your stomach or throwing up).  You may already have symptoms of COVID-19, or they may show up later.  What should I do if I have symptoms?  If you're having surgery: Call your surgeon's office.  For all other patients: Stay home and away from others (self-isolate) until ...    You've had no fever--and no medicine that reduces fever--for 1 full day (24 hours), AND    Other symptoms have gotten better. For example, your cough or breathing has improved, AND    At least 10 days have passed since your symptoms first started.  How do I self-isolate?    Stay in your own room, even for meals. Use your own bathroom if you can.    Stay away from others in your home. No hugging, kissing or shaking hands. No visitors.    Don't go to work, school or anywhere else.    Clean \"high touch\" surfaces often (doorknobs, counters, handles). Use household cleaning spray or wipes. You'll find a full list of  on the EPA website: www.epa.gov/pesticide-registration/list-n-disinfectants-use-against-sars-cov-2.    Cover your mouth and nose with a mask or other face covering to avoid spreading germs.    Wash your hands and face often. Use soap and water.    Caregivers in these groups are at risk for severe illness due to COVID-19:  ? People 65 years and older  ? People who live in a nursing home or long-term care facility  ? People with chronic disease (lung, heart, cancer, diabetes, kidney, liver, immunologic)  ? People who have a weakened immune system, including those who:    Are in cancer treatment    Take medicine that weakens the immune system, such as corticosteroids    Had a bone marrow or organ transplant    Have an immune deficiency    Have poorly controlled HIV or AIDS    Are obese (body mass index of 40 or higher)    Smoke " regularly    Caregivers should wear gloves while washing dishes, handling laundry and cleaning bedrooms and bathrooms.    Use caution when washing and drying laundry: Don't shake dirty laundry and use the warmest water setting that you can.    For more tips on managing your health at home, go to www.cdc.gov/coronavirus/2019-ncov/downloads/10Things.pdf.  How can I take care of myself at home?  1. Get lots of rest. Drink extra fluids (unless a doctor has told you not to).  2. Take Tylenol (acetaminophen) for fever or pain. If you have liver or kidney problems, ask your family doctor if it's OK to take Tylenol.   Adults can take either:  ? 650 mg (two 325 mg pills) every 4 to 6 hours, or   ? 1,000 mg (two 500 mg pills) every 8 hours as needed.  ? Note: Don't take more than 3,000 mg in one day. Acetaminophen is found in many medicines (both prescribed and over-the-counter medicines). Read all labels to be sure you don't take too much.   For children, check the Tylenol bottle for the right dose. The dose is based on the child's age or weight.  3. If you have other health problems (like cancer, heart failure, an organ transplant or severe kidney disease): Call your specialty clinic if you don't feel better in the next 2 days.  4. Know when to call 911. Emergency warning signs include:  ? Trouble breathing or shortness of breath  ? Chest pain or pressure that doesn't go away  ? Feeling confused like you haven't felt before, or not being able to wake up  ? Bluish-colored lips or face  5. If your doctor prescribed a blood thinner medicine: Follow their instructions.  Where can I get more information?     [facility] - About COVID-19:   [email or web address]    CDC - If You're Sick: cdc.gov/coronavirus/2019-ncov/about/steps-when-sick.html    CDC - Ending Home Isolation: www.cdc.gov/coronavirus/2019-ncov/hcp/disposition-in-home-patients.html    CDC - Caring for Someone:  www.cdc.gov/coronavirus/2019-ncov/if-you-are-sick/care-for-someone.html    [facility] - Interim Guidance for Hospital Discharge to Home: www.health.[facility].[place]/diseases/coronavirus/hcp/hospdischarge.pdf    [facility] clinical trials (COVID-19 research studies): [email or web address]-clinical-trials    Below are the COVID-19 hotlines at the [place] [facility] ([facility]). Interpreters are available.  ? For health questions: Call 223-866-5538 or [phone] (7 a.m. to 7 p.m.)  ? For questions about schools and childcare: Call 425-204-8614 or [phone] (7 a.m. to 7 p.m.)    For informational purposes only. Not to replace the advice of your health care provider. Clinically reviewed by Infection Prevention and the [facility] COVID-19 Clinical Team. Copyright   2020 [facility] [facility]. All rights reserved. [employer] 320092 - Rev 11/11/20.

## 2021-05-17 NOTE — PROGRESS NOTES
"    Assessment & Plan   Problem List Items Addressed This Visit     None      Visit Diagnoses     Suspected 2019 novel coronavirus infection    -  Primary  I am highly suspicious of COVID with Asif's HPI. I do not find any evidence of bacterial infection at this time and do not find a need for antibiotics at this time. I do not see any dx of asthma or COPD on his chart and he denies both at this time also. I have advised quarantine and notification of close contacts. To ED/ER if any alarm features are noted (see AVS). CBC and urine reviewed same day of visit and PLT are within his trend.     Relevant Orders    Symptomatic COVID-19 Virus (Coronavirus) by PCR (Completed)    Comprehensive metabolic panel (BMP + Alb, Alk Phos, ALT, AST, Total. Bili, TP) (Completed)    CBC with platelets and differential (Completed)    Urine Microscopic (Completed)    SARS-CoV-2 COVID-19 Virus (Coronavirus) by PCR (Completed)    Polyuria        Relevant Orders    Comprehensive metabolic panel (BMP + Alb, Alk Phos, ALT, AST, Total. Bili, TP) (Completed)    *UA reflex to Microscopic and Culture - [facility] (Completed)              Return if symptoms worsen or fail to improve.    Michelle Zuniga, CNP  [facility] - [facility]    Subjective   Asif is a 56 year old who presents for the following health issues     HPI     Acute Illness  Acute illness concerns: First symptoms was a \"light cough Sat everning and then the fever, chills, and body aches started yesterday\" Cough - Fever  Onset/Duration: 2 days ago  Symptoms:  Fever: YES- 100.2- this morning   Chills/Sweats: YES  Headache (location?): YES  Sinus Pressure: YES  Conjunctivitis:  no  Ear Pain: no  Rhinorrhea: no  Congestion: YES  Heavy breathing   Sore Throat: no  Cough: YES- dry non-productive  Wheeze: YES  Decreased Appetite: YES  Nausea: no  Vomiting: no  Diarrhea: no  Dysuria/Freq.: no  Dysuria or Hematuria: no  Fatigue/Achiness: YES- and lethargic   And  Feels " dehydrated   Sick/Strep Exposure: no  Therapies tried and outcome: none    Patient reports that he has been urinating more often than normal. No pain or difficulty voiding. Denies FHx of DM.   Does report hx of intermittent diarrhea and current stool pattern is consistent with his normal pattern. Hx of vitamin D deficiency.     Concern for COVID-19  About how many days ago did these symptoms start? 2 days   Is this your first visit for this illness? Yes  In the 14 days before your symptoms started, have you had close contact with someone with COVID-19 (Coronavirus)? No  Do you have a fever or chills? Yes, the highest temperature was 100.7  Are you having new or worsening difficulty breathing? Yes   Please describe what kind of difficulty you are having breathing:Mild dyspnea (able to do ADLs without difficulty, mild shortness of breath with activities such as climbing one or two flights of stairs or walking briskly)  Do you have new or worsening cough? Yes, it's a dry cough.   Have you had any new or unexplained body aches? YES    Have you experienced any of the following NEW symptoms?    Headache: YES    Sore throat: No    Loss of taste or smell: No- unsure no appetite     Chest pain: feels heavy - little SOB     Diarrhea: No    Rash: No  What treatments have you tried? none  Who do you live with? 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 No  Do you have a way to get food/medications if quarantined? Yes, I have a friend or family member who can help me.      /105 last night  160/85 today   Has not been vaccinated for Covid    Review of Systems   Constitutional, HEENT, cardiovascular, pulmonary, gi and gu systems are negative, except as otherwise noted.      Objective    BP (!) 140/92 (BP Location: Right arm, Patient Position: Chair, Cuff Size: Adult Large)   Pulse 104   Temp 100.7  F  "(38.2  C) (Tympanic)   Resp 14   Ht 1.778 m (5' 10\")   Wt 97.7 kg (215 lb 6.4 oz)   SpO2 96%   BMI 30.91 kg/m    Body mass index is 30.91 kg/m .  Physical Exam   GENERAL: fatigued and ill appearing. Not under acute distress. Alert.   EYES: Eyes grossly normal to inspection, PERRL and conjunctivae and sclerae normal  HENT: ear canals and TM's normal, nose and mouth without ulcers or lesions  RESP: Talking in full sentences. Cough is dry and comes in jags triggered by deep breathing. Lungs are otherwise clear throughout. Air is moving well through all fields anterior and posterior.    CV: regular rate and rhythm, normal S1 S2, no S3 or S4, no murmur, click or rub, no peripheral edema and peripheral pulses strong  SKIN: no suspicious lesions or rashes. No jaundice. Skin turgor normal.   NEURO: Normal strength and tone, mentation intact and speech normal  PSYCH: mentation appears normal, affect normal/bright    Results for orders placed or performed in visit on 05/17/21   Symptomatic COVID-19 Virus (Coronavirus) by PCR     Status: None    Specimen: Nasopharyngeal   Result Value Ref Range    COVID-19 Virus PCR to [facility] - Source Nasopharyngeal     COVID-19 Virus PCR to [facility] - Result       Test received-See reflex to IDDL test SARS CoV2 (COVID-19) Virus RT-PCR   Comprehensive metabolic panel (BMP + Alb, Alk Phos, ALT, AST, Total. Bili, TP)     Status: None   Result Value Ref Range    Sodium 136 133 - 144 mmol/L    Potassium 3.7 3.4 - 5.3 mmol/L    Chloride 102 94 - 109 mmol/L    Carbon Dioxide 25 20 - 32 mmol/L    Anion Gap 9 3 - 14 mmol/L    Glucose 97 70 - 99 mg/dL    Urea Nitrogen 13 7 - 30 mg/dL    Creatinine 0.99 0.66 - 1.25 mg/dL    GFR Estimate 84 >60 mL/min/[1.73_m2]    GFR Estimate If Black >90 >60 mL/min/[1.73_m2]    Calcium 9.4 8.5 - 10.1 mg/dL    Bilirubin Total 0.5 0.2 - 1.3 mg/dL    Albumin 4.3 3.4 - 5.0 g/dL    Protein Total 7.7 6.8 - 8.8 g/dL    Alkaline Phosphatase 79 40 - 150 U/L    ALT 47 0 - " 70 U/L    AST 39 0 - 45 U/L   CBC with platelets and differential     Status: Abnormal   Result Value Ref Range    WBC 4.3 4.0 - 11.0 10e9/L    RBC Count 5.05 4.4 - 5.9 10e12/L    Hemoglobin 14.0 13.3 - 17.7 g/dL    Hematocrit 40.6 40.0 - 53.0 %    MCV 80 78 - 100 fl    MCH 27.7 26.5 - 33.0 pg    MCHC 34.5 31.5 - 36.5 g/dL    RDW 15.2 (H) 10.0 - 15.0 %    Platelet Count 123 (L) 150 - 450 10e9/L    % Neutrophils 63.6 %    % Lymphocytes 14.4 %    % Monocytes 21.1 %    % Eosinophils 0.7 %    % Basophils 0.2 %    Absolute Neutrophil 2.7 1.6 - 8.3 10e9/L    Absolute Lymphocytes 0.6 (L) 0.8 - 5.3 10e9/L    Absolute Monocytes 0.9 0.0 - 1.3 10e9/L    Absolute Eosinophils 0.0 0.0 - 0.7 10e9/L    Absolute Basophils 0.0 0.0 - 0.2 10e9/L    Diff Method Automated Method    *UA reflex to Microscopic and Culture - [facility]     Status: Abnormal    Specimen: Midstream Urine   Result Value Ref Range    Color Urine Yellow     Appearance Urine Clear     Glucose Urine Negative NEG^Negative mg/dL    Bilirubin Urine Negative NEG^Negative    Ketones Urine Negative NEG^Negative mg/dL    Specific Gravity Urine >1.030 1.003 - 1.035    Blood Urine Small (A) NEG^Negative    pH Urine 5.5 5.0 - 7.0 pH    Protein Albumin Urine Negative NEG^Negative mg/dL    Urobilinogen Urine 0.2 0.2 - 1.0 EU/dL    Nitrite Urine Negative NEG^Negative    Leukocyte Esterase Urine Negative NEG^Negative    Source Midstream Urine    Urine Microscopic     Status: Abnormal   Result Value Ref Range    WBC Urine 0 - 5 OTO5^0 - 5 /HPF    RBC Urine 2-5 (A) OTO2^O - 2 /HPF    Squamous Epithelial /LPF Urine RARE FEW^Few /LPF    Bacteria Urine RARE NEG^Negative /HPF     COVID result pending.    2

## 2021-05-17 NOTE — TELEPHONE ENCOUNTER
"/105 last night  160/85 today   Has not been vaccinated for Covid.  Patient want's to be seen    Reason for Disposition    Patient wants to be seen    Answer Assessment - Initial Assessment Questions  1. ONSET: \"When did the cough begin?\"        saturday  2. SEVERITY: \"How bad is the cough today?\"       Dry cough  3. RESPIRATORY DISTRESS: \"Describe your breathing.\"       Heavy breathing  4. FEVER: \"Do you have a fever?\" If so, ask: \"What is your temperature, how was it measured, and when did it start?\"      100.2  5. HEMOPTYSIS: \"Are you coughing up any blood?\" If so ask: \"How much?\" (flecks, streaks, tablespoons, etc.)      no  6. TREATMENT: \"What have you done so far to treat the cough?\" (e.g., meds, fluids, humidifier)      nothing  7. CARDIAC HISTORY: \"Do you have any history of heart disease?\" (e.g., heart attack, congestive heart failure)       no  8. LUNG HISTORY: \"Do you have any history of lung disease?\"  (e.g., pulmonary embolus, asthma, emphysema)      no  9. PE RISK FACTORS: \"Do you have a history of blood clots?\" (or: recent major surgery, recent prolonged travel, bedridden)      no  10. OTHER SYMPTOMS: \"Do you have any other symptoms? (e.g., runny nose, wheezing, chest pain)        sinus are congestion , headache,   11. PREGNANCY: \"Is there any chance you are pregnant?\" \"When was your last menstrual period?\"        no  12. TRAVEL: \"Have you traveled out of the country in the last month?\" (e.g., travel history, exposures)        no    Protocols used: COUGH-A-[place]      "

## 2021-05-18 LAB
ALBUMIN SERPL-MCNC: 4.3 G/DL (ref 3.4–5)
ALP SERPL-CCNC: 79 U/L (ref 40–150)
ALT SERPL W P-5'-P-CCNC: 47 U/L (ref 0–70)
ANION GAP SERPL CALCULATED.3IONS-SCNC: 9 MMOL/L (ref 3–14)
AST SERPL W P-5'-P-CCNC: 39 U/L (ref 0–45)
BILIRUB SERPL-MCNC: 0.5 MG/DL (ref 0.2–1.3)
BUN SERPL-MCNC: 13 MG/DL (ref 7–30)
CALCIUM SERPL-MCNC: 9.4 MG/DL (ref 8.5–10.1)
CHLORIDE SERPL-SCNC: 102 MMOL/L (ref 94–109)
CO2 SERPL-SCNC: 25 MMOL/L (ref 20–32)
CREAT SERPL-MCNC: 0.99 MG/DL (ref 0.66–1.25)
GFR SERPL CREATININE-BSD FRML MDRD: 84 ML/MIN/{1.73_M2}
GLUCOSE SERPL-MCNC: 97 MG/DL (ref 70–99)
POTASSIUM SERPL-SCNC: 3.7 MMOL/L (ref 3.4–5.3)
PROT SERPL-MCNC: 7.7 G/DL (ref 6.8–8.8)
SARS-COV-2 RNA RESP QL NAA+PROBE: NORMAL
SODIUM SERPL-SCNC: 136 MMOL/L (ref 133–144)
SPECIMEN SOURCE: NORMAL

## 2021-05-19 ENCOUNTER — TELEPHONE (OUTPATIENT)
Dept: FAMILY MEDICINE | Facility: OTHER | Age: 56
End: 2021-05-19

## 2021-05-19 LAB
LABORATORY COMMENT REPORT: ABNORMAL
SARS-COV-2 RNA RESP QL NAA+PROBE: POSITIVE
SPECIMEN SOURCE: ABNORMAL

## 2021-05-19 NOTE — TELEPHONE ENCOUNTER
Notified patient of COVID positive status. Patient reports that symptoms are improving. He continues to have sinus congestion, cough, and nasal drainage but denies moderate or severe SOB. Reviewed basics of CDC guidelines and advised he go the CDC web site for complete information. No indication for antibiotics at this time. He will call with any concerns.

## 2021-07-09 ENCOUNTER — OFFICE VISIT (OUTPATIENT)
Dept: FAMILY MEDICINE | Facility: OTHER | Age: 56
End: 2021-07-09
Attending: NURSE PRACTITIONER
Payer: COMMERCIAL

## 2021-07-09 VITALS
WEIGHT: 211 LBS | DIASTOLIC BLOOD PRESSURE: 80 MMHG | HEART RATE: 80 BPM | HEIGHT: 70 IN | OXYGEN SATURATION: 96 % | SYSTOLIC BLOOD PRESSURE: 143 MMHG | BODY MASS INDEX: 30.21 KG/M2 | TEMPERATURE: 97 F

## 2021-07-09 DIAGNOSIS — E55.9 VITAMIN D DEFICIENCY: ICD-10-CM

## 2021-07-09 DIAGNOSIS — I10 ESSENTIAL HYPERTENSION: ICD-10-CM

## 2021-07-09 DIAGNOSIS — Z12.5 SCREENING FOR PROSTATE CANCER: ICD-10-CM

## 2021-07-09 DIAGNOSIS — F41.9 ANXIETY: Primary | Chronic | ICD-10-CM

## 2021-07-09 DIAGNOSIS — Z00.00 ANNUAL PHYSICAL EXAM: ICD-10-CM

## 2021-07-09 DIAGNOSIS — E80.0 ERYTHROPOIETIC PROTOPORPHYRIA (H): ICD-10-CM

## 2021-07-09 PROBLEM — M25.512 ACUTE PAIN OF LEFT SHOULDER: Status: RESOLVED | Noted: 2019-01-31 | Resolved: 2021-07-09

## 2021-07-09 PROBLEM — Z86.16 HISTORY OF COVID-19: Status: ACTIVE | Noted: 2021-07-09

## 2021-07-09 LAB
ALBUMIN SERPL-MCNC: 4.1 G/DL (ref 3.4–5)
ALP SERPL-CCNC: 82 U/L (ref 40–150)
ALT SERPL W P-5'-P-CCNC: 54 U/L (ref 0–70)
ANION GAP SERPL CALCULATED.3IONS-SCNC: 9 MMOL/L (ref 3–14)
AST SERPL W P-5'-P-CCNC: 37 U/L (ref 0–45)
BILIRUB SERPL-MCNC: 0.4 MG/DL (ref 0.2–1.3)
BUN SERPL-MCNC: 24 MG/DL (ref 7–30)
CALCIUM SERPL-MCNC: 9.5 MG/DL (ref 8.5–10.1)
CHLORIDE SERPL-SCNC: 109 MMOL/L (ref 94–109)
CHOLEST SERPL-MCNC: 187 MG/DL
CO2 SERPL-SCNC: 23 MMOL/L (ref 20–32)
CREAT SERPL-MCNC: 0.95 MG/DL (ref 0.66–1.25)
GFR SERPL CREATININE-BSD FRML MDRD: 88 ML/MIN/{1.73_M2}
GLUCOSE SERPL-MCNC: 100 MG/DL (ref 70–99)
HDLC SERPL-MCNC: 39 MG/DL
LDLC SERPL CALC-MCNC: 93 MG/DL
NONHDLC SERPL-MCNC: 148 MG/DL
POTASSIUM SERPL-SCNC: 3.7 MMOL/L (ref 3.4–5.3)
PROT SERPL-MCNC: 7.4 G/DL (ref 6.8–8.8)
PSA SERPL-ACNC: 2.51 UG/L (ref 0–4)
SODIUM SERPL-SCNC: 141 MMOL/L (ref 133–144)
TRIGL SERPL-MCNC: 277 MG/DL

## 2021-07-09 PROCEDURE — 36415 COLL VENOUS BLD VENIPUNCTURE: CPT | Performed by: NURSE PRACTITIONER

## 2021-07-09 PROCEDURE — 80053 COMPREHEN METABOLIC PANEL: CPT | Performed by: NURSE PRACTITIONER

## 2021-07-09 PROCEDURE — 82306 VITAMIN D 25 HYDROXY: CPT | Performed by: NURSE PRACTITIONER

## 2021-07-09 PROCEDURE — 99214 OFFICE O/P EST MOD 30 MIN: CPT | Performed by: NURSE PRACTITIONER

## 2021-07-09 PROCEDURE — 84202 ASSAY RBC PROTOPORPHYRIN: CPT | Mod: 90 | Performed by: NURSE PRACTITIONER

## 2021-07-09 PROCEDURE — 80061 LIPID PANEL: CPT | Performed by: NURSE PRACTITIONER

## 2021-07-09 PROCEDURE — G0103 PSA SCREENING: HCPCS | Performed by: NURSE PRACTITIONER

## 2021-07-09 RX ORDER — LOSARTAN POTASSIUM 50 MG/1
50 TABLET ORAL DAILY
Qty: 90 TABLET | Refills: 1 | Status: SHIPPED | OUTPATIENT
Start: 2021-07-09 | End: 2022-01-17

## 2021-07-09 ASSESSMENT — ANXIETY QUESTIONNAIRES
4. TROUBLE RELAXING: NOT AT ALL
5. BEING SO RESTLESS THAT IT IS HARD TO SIT STILL: SEVERAL DAYS
GAD7 TOTAL SCORE: 1
IF YOU CHECKED OFF ANY PROBLEMS ON THIS QUESTIONNAIRE, HOW DIFFICULT HAVE THESE PROBLEMS MADE IT FOR YOU TO DO YOUR WORK, TAKE CARE OF THINGS AT HOME, OR GET ALONG WITH OTHER PEOPLE: NOT DIFFICULT AT ALL
7. FEELING AFRAID AS IF SOMETHING AWFUL MIGHT HAPPEN: NOT AT ALL
3. WORRYING TOO MUCH ABOUT DIFFERENT THINGS: NOT AT ALL
6. BECOMING EASILY ANNOYED OR IRRITABLE: NOT AT ALL
1. FEELING NERVOUS, ANXIOUS, OR ON EDGE: NOT AT ALL
2. NOT BEING ABLE TO STOP OR CONTROL WORRYING: NOT AT ALL

## 2021-07-09 ASSESSMENT — MIFFLIN-ST. JEOR: SCORE: 1788.34

## 2021-07-09 ASSESSMENT — PATIENT HEALTH QUESTIONNAIRE - PHQ9: SUM OF ALL RESPONSES TO PHQ QUESTIONS 1-9: 3

## 2021-07-09 ASSESSMENT — PAIN SCALES - GENERAL: PAINLEVEL: NO PAIN (0)

## 2021-07-09 NOTE — PATIENT INSTRUCTIONS
Assessment & Plan        Annual physical exam  - Lipid Profile (Chol, Trig, HDL, LDL calc)    Anxiety  - Follow-up as needed    Screening for prostate cancer  - PSA, screen    Vitamin D deficiency  - Vitamin D level  - D3 5000 U OTC    Erythropoietic protoporphyria (H)  - Testing ordered  - Will refer to [facility] when testing returns    HTN - Goal 130's/70's  - Losartan 50 mg daily  - Low salt diet  - Will recheck BP in August when you come in for your pre-op      Laury Trivedi CNP  [facility]

## 2021-07-09 NOTE — PROGRESS NOTES
Assessment & Plan        Annual physical exam  - Lipid Profile (Chol, Trig, HDL, LDL calc)    Anxiety  - Follow-up as needed    Screening for prostate cancer  - PSA, screen    Vitamin D deficiency  - Vitamin D level  - D3 5000 U OTC    Erythropoietic protoporphyria (H)  - Testing ordered  - Will refer to [facility] when testing returns    HTN - Goal 130's/70's  - Losartan 50 mg daily  - Low salt diet  - Will recheck BP in August when you come in for your pre-op      Laury Trivedi CNP  [facility] - RADHA Gold is a 56 year old who presents for the following health issues       Concern - dermatology  Erythropoietic Protoporphyria, 2nd degree  He had testing many years ago at the [facility] when he was a child  Onset: want a referral to [facility] for implant for genetic disorder.  [facility] places the implant.   Description: [facility] is requiring referral verifying blood disorder prior to implant replacement.           Anxiety Follow-Up     Has gone off his Lexapro    How are you doing with your anxiety since your last visit? No change    Are you having other symptoms that might be associated with anxiety? No    Have you had a significant life event? No     Are you feeling depressed? No    Do you have any concerns with your use of alcohol or other drugs? No       Social History     Tobacco Use     Smoking status: Never Smoker     Smokeless tobacco: Never Used   Substance Use Topics     Alcohol use: Yes     Comment: occasional     Drug use: No     GIOVANY-7 SCORE 12/10/2018 6/22/2020 7/9/2021   Total Score 5 0 1       PHQ 12/10/2018 6/22/2020 7/9/2021   PHQ-9 Total Score 9 0 3   Q9: Thoughts of better off dead/self-harm past 2 weeks Not at all Not at all Not at all         Patient Active Problem List   Diagnosis     Anxiety     Chronic midline low back pain without sciatica     Primary insomnia     Vitamin D deficiency     Observed sleep apnea     Sacroiliac joint dysfunction     History  of colonic polyps     Impingement syndrome of left shoulder     Past Surgical History:   Procedure Laterality Date     AS COLONOSCOPY W BX FORCEP/CAUTERY REMOVAL KENYA/POLYP/LESION N/A 01/01/1999    stomache pain/family hx     COLONOSCOPY  01/01/2011    f/u polyectomy/ family HX crohns     COLONOSCOPY N/A 12/20/2018    Procedure: COLONOSCOPY with Polypectomy;  Surgeon: Yared Vilchis MD;  Location: [facility] OR     ORTHOPEDIC SURGERY      shoulder surgery     RECESSION RESECTION (REPAIR STRABISMUS) BILATERAL  10/18/2011    Procedure:RECESSION RESECTION (REPAIR STRABISMUS) BILATERAL; Strabismus Repair Right; Surgeon:MERCEDEZ CHAUHAN; Location: OR       Social History     Tobacco Use     Smoking status: Never Smoker     Smokeless tobacco: Never Used   Substance Use Topics     Alcohol use: Yes     Comment: occasional     Family History   Problem Relation Age of Onset     Cancer Mother         Brain - Cause of Death     Crohn's Disease Father         Cause of Death     Crohn's Disease Sister            Current Outpatient Medications   Medication Sig Dispense Refill     ACETAMINOPHEN EXTRA STRENGTH 500 MG tablet        escitalopram (LEXAPRO) 20 MG tablet TAKE 1 TABLET DAILY (Patient not taking: Reported on 7/9/2021) 90 tablet 1         Allergies   Allergen Reactions     No Clinical Screening - See Comments      Seasonal allergies       Recent Labs   Lab Test 05/17/21  1609 06/22/20  0903 05/22/19  0932 01/16/18  0730 01/16/18  0730 12/17/14  0902   LDL  --  99  --   --  93  --    HDL  --  42  --   --  43  --    TRIG  --  234*  --   --  233*  --    ALT 47 58 42  --   --   --    CR 0.99 0.87 1.02   < >  --   --    GFRESTIMATED 84 >90 82   < >  --   --    GFRESTBLACK >90 >90 >90   < >  --   --    POTASSIUM 3.7 4.0 4.2   < >  --   --    TSH  --   --   --   --  2.80 2.90    < > = values in this interval not displayed.        BP Readings from Last 3 Encounters:   07/09/21 (!) 144/80   05/17/21 (!) 140/92   02/23/21 (!) 159/103    Wt  "Readings from Last 3 Encounters:   07/09/21 95.7 kg (211 lb)   05/17/21 97.7 kg (215 lb 6.4 oz)   06/22/20 93.9 kg (207 lb)               Review of Systems   Constitutional, HEENT, cardiovascular, pulmonary, GI, , neuro, skin, endocrine and psych systems are negative, except as otherwise noted.  Musculoskeletal: POSITIVE for bilateral knee pain, appointment for surgery scheduled           Objective    BP (!) 144/80 (BP Location: Left arm, Patient Position: Chair, Cuff Size: Adult Large)   Pulse 80   Temp 97  F (36.1  C) (Tympanic)   Ht 1.778 m (5' 10\")   Wt 95.7 kg (211 lb)   SpO2 96%   BMI 30.28 kg/m    Body mass index is 30.28 kg/m .     Repeat /80: left arm, adult regular       Physical Exam   GENERAL: healthy, alert and no distress  RESP: lungs clear to auscultation - no rales, rhonchi or wheezes  CV: regular rate and rhythm, normal S1 S2, no S3 or S4, no murmur, click or rub, no peripheral edema and peripheral pulses strong  MS: no gross musculoskeletal defects noted, no edema  NEURO: Normal strength and tone, mentation intact and speech normal  PSYCH: mentation appears normal, affect normal/bright          "

## 2021-07-09 NOTE — NURSING NOTE
"Chief Complaint   Patient presents with     Anxiety     Referral     dermatology to [facility]        Initial BP (!) 144/80 (BP Location: Left arm, Patient Position: Chair, Cuff Size: Adult Large)   Pulse 80   Temp 97  F (36.1  C) (Tympanic)   Ht 1.778 m (5' 10\")   Wt 95.7 kg (211 lb)   SpO2 96%   BMI 30.28 kg/m   Estimated body mass index is 30.28 kg/m  as calculated from the following:    Height as of this encounter: 1.778 m (5' 10\").    Weight as of this encounter: 95.7 kg (211 lb).  Medication Reconciliation: complete  Merlyn Bermeo LPN    "

## 2021-07-10 ASSESSMENT — ANXIETY QUESTIONNAIRES: GAD7 TOTAL SCORE: 1

## 2021-07-12 ENCOUNTER — TELEPHONE (OUTPATIENT)
Dept: FAMILY MEDICINE | Facility: OTHER | Age: 56
End: 2021-07-12

## 2021-07-12 DIAGNOSIS — E78.2 MIXED HYPERLIPIDEMIA: Primary | ICD-10-CM

## 2021-07-12 LAB — DEPRECATED CALCIDIOL+CALCIFEROL SERPL-MC: 24 UG/L (ref 20–75)

## 2021-07-12 RX ORDER — ATORVASTATIN CALCIUM 20 MG/1
20 TABLET, FILM COATED ORAL DAILY
Qty: 90 TABLET | Refills: 1 | Status: SHIPPED | OUTPATIENT
Start: 2021-07-12 | End: 2022-01-17

## 2021-07-12 NOTE — TELEPHONE ENCOUNTER
Called pt to schedule his covid swab for his upcoming procedure. I left a message to call back [phone].     DOS 8-12-21 @ [facility] [place]/ Dr. Hand  (draw date 5-7 prior to surgery)

## 2021-07-12 NOTE — RESULT ENCOUNTER NOTE
Low Vitamin D  Please have him take D3 5000 U daily    Lipids are elevated, risk score high  Please see if he will consider Lipitor 20 mg daily.  If willing, pls enter order    Low fat diet  Fish oil 3 per day      The 10-year ASCVD risk score (Elizabeth BROCK Jr., et al., 2013) is: 10.7%    Values used to calculate the score:      Age: 56 years      Sex: Male      Is Non- : No      Diabetic: No      Tobacco smoker: No      Systolic Blood Pressure: 143 mmHg      Is BP treated: Yes      HDL Cholesterol: 39 mg/dL      Total Cholesterol: 187 mg/dL

## 2021-07-14 DIAGNOSIS — E80.0 ERYTHROPOIETIC PROTOPORPHYRIA (H): Primary | ICD-10-CM

## 2021-07-14 LAB — PROTOPOR RBC-MCNC: 954 UG/DL (ref 0–35)

## 2021-07-20 NOTE — PROGRESS NOTES
[facility]  [address]  [facility] [address]  Phone: [phone]  Primary Provider: Laury Rico  Pre-op Performing Provider: LAURY RICO      PREOPERATIVE EVALUATION:  Today's date: 8/4/2021      Asif Leon is a 56 year old male who presents for a preoperative evaluation.    Surgical Information:  Surgery/Procedure: Left Total Knee Revision  Surgery Location: [facility]  Surgeon: Dr. Hand  Surgery Date: 08/12/2021  Time of Surgery: TBD  Where patient plans to recover: At home with family  Fax number for surgical facility: On File      Type of Anesthesia Anticipated: to be determined      Subjective     HPI related to upcoming procedure:   Arthropathy of Left Knee  History of Left TKA      Preop Questions 8/4/2021   1. Have you ever had a heart attack or stroke? No   2. Have you ever had surgery on your heart or blood vessels, such as a stent placement, a coronary artery bypass, or surgery on an artery in your head, neck, heart, or legs? No   3. Do you have chest pain with activity? No   4. Do you have a history of  heart failure? No   5. Do you currently have a cold, bronchitis or symptoms of other infection? No   6. Do you have a cough, shortness of breath, or wheezing? No   7. Do you or anyone in your family have previous history of blood clots? No   8. Do you or does anyone in your family have a serious bleeding problem such as prolonged bleeding following surgeries or cuts? No   9. Have you ever had problems with anemia or been told to take iron pills? No   10. Have you had any abnormal blood loss such as black, tarry or bloody stools? No   11. Have you ever had a blood transfusion? No   12. Are you willing to have a blood transfusion if it is medically needed before, during, or after your surgery? Yes   13. Have you or any of your relatives ever had problems with anesthesia? No   14. Do you have sleep apnea, excessive snoring or  daytime drowsiness? YES -not officially diagnosed, does not work CPAP   14a. Do you have a CPAP machine? No   15. Do you have any artifical heart valves or other implanted medical devices like a pacemaker, defibrillator, or continuous glucose monitor? No   16. Do you have artificial joints? YES - left knee, right hip    17. Are you allergic to latex? No       Health Care Directive:  Patient does not have a Health Care Directive or Living Will: Discussed advance care planning with patient; information given to patient to review.      Status of Chronic Conditions:  See problem list for active medical problems.  Problems all longstanding and stable, except as noted/documented.  See ROS for pertinent symptoms related to these conditions.      Review of Systems  Review Of Systems  Skin: positive for dry skin, rash to left hand   Eyes: negative for visual blurring, double vision POSITIVE for glasses  Ears/Nose/Throat: POSITIVE for nasal congestion, postnasal drainage, tinnitus. Negative for vertigo, sinus trouble, persistent sore throat  Respiratory: No shortness of breath, dyspnea on exertion, cough, or hemoptysis  Cardiovascular: negative for palpitations, tachycardia, chest pain, orthopnea and syncope or near-syncope  Gastrointestinal: POSITIVE for occasional reflux. Negative for heartburn, reflux, abdominal pain, melena and hematochezia  Genitourinary: negative, dysuria, urgency and retention  Musculoskeletal: POSITIVE for bilateral knee arthritis, joint pain, joint swelling and joint stiffness  Neurologic: POSITIVE for sinus headaches, Negative for numbness or tingling of hands, numbness or tingling of feet, memory problems and behavior changes  Psychiatric: negative for excessive stress, anxiety, depression and thoughts of self-harm  Hematologic/Lymphatic/Immunologic: negative, chills, fever, night sweats and swollen nodes      Patient Active Problem List    Diagnosis Date Noted     History of COVID-19 07/09/2021      Priority: Medium     Essential hypertension 07/09/2021     Priority: Medium     Impingement syndrome of left shoulder 01/31/2019     Priority: Medium     Observed sleep apnea 12/10/2018     Priority: Medium     Sacroiliac joint dysfunction 12/10/2018     Priority: Medium     History of colonic polyps 12/10/2018     Priority: Medium     Vitamin D deficiency 04/25/2018     Priority: Medium     Anxiety 01/15/2018     Priority: Medium     Chronic midline low back pain without sciatica 01/15/2018     Priority: Medium     Primary insomnia 01/15/2018     Priority: Medium        Past Medical History:   Diagnosis Date     Depressive disorder      History of COVID-19 7/9/2021     TGA (transient global amnesia) 01/04/2018    admitted with a 6 hour episode of confusion and anterograde and retrograde amnesia. CT head, CTA head and neck, MRI brain and EEG were negative. Neurology was consulted and felt this to have been an episode of transient global amnesia that can be followed by his PCP with referral to neurology if recurs.        Past Surgical History:   Procedure Laterality Date     AS COLONOSCOPY W BX FORCEP/CAUTERY REMOVAL KENYA/POLYP/LESION N/A 01/01/1999    stomache pain/family hx     COLONOSCOPY  01/01/2011    f/u polyectomy/ family HX crohns     COLONOSCOPY N/A 12/20/2018    Procedure: COLONOSCOPY with Polypectomy;  Surgeon: Yared Vilchis MD;  Location: [facility] OR     ORTHOPEDIC SURGERY      shoulder surgery     RECESSION RESECTION (REPAIR STRABISMUS) BILATERAL  10/18/2011    Procedure:RECESSION RESECTION (REPAIR STRABISMUS) BILATERAL; Strabismus Repair Right; Surgeon:MERCEDEZ CHAUHAN; Location:[facility]       Current Outpatient Medications   Medication Sig Dispense Refill     ACETAMINOPHEN EXTRA STRENGTH 500 MG tablet        atorvastatin (LIPITOR) 20 MG tablet Take 1 tablet (20 mg) by mouth daily 90 tablet 1     losartan (COZAAR) 50 MG tablet Take 1 tablet (50 mg) by mouth daily 90 tablet 1       Allergies   Allergen  Reactions     No Clinical Screening - See Comments      Seasonal allergies        Social History     Tobacco Use     Smoking status: Never Smoker     Smokeless tobacco: Never Used   Substance Use Topics     Alcohol use: Yes     Comment: occasional     Family History   Problem Relation Age of Onset     Cancer Mother         Brain - Cause of Death     Crohn's Disease Father         Cause of Death     Crohn's Disease Sister      History   Drug Use No         Objective     /78 (BP Location: Right arm, Patient Position: Sitting, Cuff Size: Adult Large)   Pulse 84   Temp (!) 96.5  F (35.8  C) (Tympanic)   Resp 18   Wt 94.3 kg (208 lb)   SpO2 96%   BMI 29.84 kg/m      Physical Exam    GENERAL APPEARANCE: healthy, alert and no distress     EYES: EOMI,  PERRL     NECK: no adenopathy, no asymmetry, masses, or scars and thyroid normal to palpation     RESP: lungs clear to auscultation - no rales, rhonchi or wheezes     CV: regular rates and rhythm, normal S1 S2, no S3 or S4 and no murmur, click or rub     MS:  no gross deformities noted,  inflammation in left knee, slightly limited ROM in left knee, full ROM in right knee      SKIN: no suspicious lesions or rashes     NEURO: Normal strength and tone, sensory exam grossly normal, mentation intact and speech normal     PSYCH: mentation appears normal. and affect normal/bright     LYMPHATICS: No cervical adenopathy      Recent Labs   Lab Test 07/09/21  1356 05/17/21  1609 06/22/20  0903   HGB  --  14.0 13.8   PLT  --  123* 134*    136 138   POTASSIUM 3.7 3.7 4.0   CR 0.95 0.99 0.87          Diagnostics:  Recent Results (from the past 24 hour(s))   Comprehensive metabolic panel    Collection Time: 08/04/21  2:12 PM   Result Value Ref Range    Sodium 139 133 - 144 mmol/L    Potassium 3.7 3.4 - 5.3 mmol/L    Chloride 106 94 - 109 mmol/L    Carbon Dioxide (CO2) 27 20 - 32 mmol/L    Anion Gap 6 3 - 14 mmol/L    Urea Nitrogen 18 7 - 30 mg/dL    Creatinine 1.05 0.66 -  1.25 mg/dL    Calcium 9.7 8.5 - 10.1 mg/dL    Glucose 88 70 - 99 mg/dL    Alkaline Phosphatase 79 40 - 150 U/L    AST 35 0 - 45 U/L    ALT 52 0 - 70 U/L    Protein Total 7.7 6.8 - 8.8 g/dL    Albumin 4.4 3.4 - 5.0 g/dL    Bilirubin Total 0.9 0.2 - 1.3 mg/dL    GFR Estimate 78 >60 mL/min/1.73m2   CBC with platelets and differential    Collection Time: 08/04/21  2:12 PM   Result Value Ref Range    WBC Count 6.8 4.0 - 11.0 10e3/uL    RBC Count 4.75 4.40 - 5.90 10e6/uL    Hemoglobin 13.5 13.3 - 17.7 g/dL    Hematocrit 39.4 (L) 40.0 - 53.0 %    MCV 83 78 - 100 fL    MCH 28.4 26.5 - 33.0 pg    MCHC 34.3 31.5 - 36.5 g/dL    RDW 15.1 (H) 10.0 - 15.0 %    Platelet Count 160 150 - 450 10e3/uL    % Neutrophils 63 %    % Lymphocytes 25 %    % Monocytes 10 %    % Eosinophils 2 %    % Basophils 0 %    Absolute Neutrophils 4.3 1.6 - 8.3 10e3/uL    Absolute Lymphocytes 1.7 0.8 - 5.3 10e3/uL    Absolute Monocytes 0.7 0.0 - 1.3 10e3/uL    Absolute Eosinophils 0.1 0.0 - 0.7 10e3/uL    Absolute Basophils 0.0 0.0 - 0.2 10e3/uL   Extra Serum Separator Tube (SST)    Collection Time: 08/04/21  2:12 PM   Result Value Ref Range    Hold Specimen JI           EKG: appears normal, NSR, normal axis, normal intervals, no acute ST/T changes c/w ischemia, no LVH by voltage criteria      Revised Cardiac Risk Index (RCRI):  The patient has the following serious cardiovascular risks for perioperative complications:   - No serious cardiac risks = 0 points       RCRI Interpretation: 0 points: Class I (very low risk - 0.4% complication rate)         Assessment & Plan     The proposed surgical procedure is considered LOW risk.    Preop general physical exam  - EKG 12-lead complete w/read - (Clinic Performed)  - CBC with platelets and differential  - Comprehensive metabolic panel    Arthropathy of left knee  - See above    History of total knee arthroplasty, left  - See above        Risks and Recommendations:  The patient has the following additional  risks and recommendations for perioperative complications:   - No identified additional risk factors other than previously addressed        RECOMMENDATION:  APPROVAL GIVEN to proceed with proposed procedure, without further diagnostic evaluation.      Signed Electronically by: Laury Trivedi CNP  Copy of this evaluation report is provided to requesting physician.

## 2021-07-20 NOTE — PATIENT INSTRUCTIONS

## 2021-08-04 ENCOUNTER — OFFICE VISIT (OUTPATIENT)
Dept: FAMILY MEDICINE | Facility: OTHER | Age: 56
End: 2021-08-04
Attending: NURSE PRACTITIONER
Payer: COMMERCIAL

## 2021-08-04 VITALS
TEMPERATURE: 96.5 F | OXYGEN SATURATION: 96 % | WEIGHT: 208 LBS | HEART RATE: 84 BPM | SYSTOLIC BLOOD PRESSURE: 136 MMHG | BODY MASS INDEX: 29.84 KG/M2 | DIASTOLIC BLOOD PRESSURE: 78 MMHG | RESPIRATION RATE: 18 BRPM

## 2021-08-04 DIAGNOSIS — Z01.818 PREOP GENERAL PHYSICAL EXAM: Primary | ICD-10-CM

## 2021-08-04 DIAGNOSIS — U07.1 LAB TEST POSITIVE FOR DETECTION OF COVID-19 VIRUS: ICD-10-CM

## 2021-08-04 DIAGNOSIS — M17.12 ARTHROPATHY OF LEFT KNEE: ICD-10-CM

## 2021-08-04 DIAGNOSIS — Z96.652 HISTORY OF TOTAL KNEE ARTHROPLASTY, LEFT: ICD-10-CM

## 2021-08-04 LAB
ALBUMIN SERPL-MCNC: 4.4 G/DL (ref 3.4–5)
ALP SERPL-CCNC: 79 U/L (ref 40–150)
ALT SERPL W P-5'-P-CCNC: 52 U/L (ref 0–70)
ANION GAP SERPL CALCULATED.3IONS-SCNC: 6 MMOL/L (ref 3–14)
AST SERPL W P-5'-P-CCNC: 35 U/L (ref 0–45)
BASOPHILS # BLD AUTO: 0 10E3/UL (ref 0–0.2)
BASOPHILS NFR BLD AUTO: 0 %
BILIRUB SERPL-MCNC: 0.9 MG/DL (ref 0.2–1.3)
BUN SERPL-MCNC: 18 MG/DL (ref 7–30)
CALCIUM SERPL-MCNC: 9.7 MG/DL (ref 8.5–10.1)
CHLORIDE BLD-SCNC: 106 MMOL/L (ref 94–109)
CO2 SERPL-SCNC: 27 MMOL/L (ref 20–32)
CREAT SERPL-MCNC: 1.05 MG/DL (ref 0.66–1.25)
EOSINOPHIL # BLD AUTO: 0.1 10E3/UL (ref 0–0.7)
EOSINOPHIL NFR BLD AUTO: 2 %
ERYTHROCYTE [DISTWIDTH] IN BLOOD BY AUTOMATED COUNT: 15.1 % (ref 10–15)
GFR SERPL CREATININE-BSD FRML MDRD: 78 ML/MIN/1.73M2
GLUCOSE BLD-MCNC: 88 MG/DL (ref 70–99)
HCT VFR BLD AUTO: 39.4 % (ref 40–53)
HGB BLD-MCNC: 13.5 G/DL (ref 13.3–17.7)
HOLD SPECIMEN: NORMAL
LYMPHOCYTES # BLD AUTO: 1.7 10E3/UL (ref 0.8–5.3)
LYMPHOCYTES NFR BLD AUTO: 25 %
MCH RBC QN AUTO: 28.4 PG (ref 26.5–33)
MCHC RBC AUTO-ENTMCNC: 34.3 G/DL (ref 31.5–36.5)
MCV RBC AUTO: 83 FL (ref 78–100)
MONOCYTES # BLD AUTO: 0.7 10E3/UL (ref 0–1.3)
MONOCYTES NFR BLD AUTO: 10 %
NEUTROPHILS # BLD AUTO: 4.3 10E3/UL (ref 1.6–8.3)
NEUTROPHILS NFR BLD AUTO: 63 %
PLATELET # BLD AUTO: 160 10E3/UL (ref 150–450)
POTASSIUM BLD-SCNC: 3.7 MMOL/L (ref 3.4–5.3)
PROT SERPL-MCNC: 7.7 G/DL (ref 6.8–8.8)
RBC # BLD AUTO: 4.75 10E6/UL (ref 4.4–5.9)
SODIUM SERPL-SCNC: 139 MMOL/L (ref 133–144)
WBC # BLD AUTO: 6.8 10E3/UL (ref 4–11)

## 2021-08-04 PROCEDURE — 93000 ELECTROCARDIOGRAM COMPLETE: CPT | Mod: 77 | Performed by: INTERNAL MEDICINE

## 2021-08-04 PROCEDURE — 86769 SARS-COV-2 COVID-19 ANTIBODY: CPT | Performed by: NURSE PRACTITIONER

## 2021-08-04 PROCEDURE — 36415 COLL VENOUS BLD VENIPUNCTURE: CPT | Performed by: NURSE PRACTITIONER

## 2021-08-04 PROCEDURE — 99214 OFFICE O/P EST MOD 30 MIN: CPT | Performed by: NURSE PRACTITIONER

## 2021-08-04 PROCEDURE — 80053 COMPREHEN METABOLIC PANEL: CPT | Performed by: NURSE PRACTITIONER

## 2021-08-04 PROCEDURE — 85025 COMPLETE CBC W/AUTO DIFF WBC: CPT | Performed by: NURSE PRACTITIONER

## 2021-08-04 ASSESSMENT — PAIN SCALES - GENERAL: PAINLEVEL: MODERATE PAIN (5)

## 2021-08-04 NOTE — NURSING NOTE
"Chief Complaint   Patient presents with     Pre-Op Exam       Initial /78 (BP Location: Right arm, Patient Position: Sitting, Cuff Size: Adult Large)   Pulse 84   Temp (!) 96.5  F (35.8  C) (Tympanic)   Resp 18   Wt 94.3 kg (208 lb)   SpO2 96%   BMI 29.84 kg/m   Estimated body mass index is 29.84 kg/m  as calculated from the following:    Height as of 7/9/21: 1.778 m (5' 10\").    Weight as of this encounter: 94.3 kg (208 lb).  Medication Reconciliation: complete  Lashell Waite LPN  "

## 2021-08-05 ENCOUNTER — OFFICE VISIT (OUTPATIENT)
Dept: FAMILY MEDICINE | Facility: OTHER | Age: 56
End: 2021-08-05
Attending: SPECIALIST
Payer: COMMERCIAL

## 2021-08-05 ENCOUNTER — TELEPHONE (OUTPATIENT)
Dept: FAMILY MEDICINE | Facility: OTHER | Age: 56
End: 2021-08-05

## 2021-08-05 DIAGNOSIS — U07.1 LAB TEST POSITIVE FOR DETECTION OF COVID-19 VIRUS: Primary | ICD-10-CM

## 2021-08-05 DIAGNOSIS — Z01.818 PREOPERATIVE EXAMINATION: Primary | ICD-10-CM

## 2021-08-05 LAB — SARS-COV-2 RNA RESP QL NAA+PROBE: POSITIVE

## 2021-08-05 PROCEDURE — U0003 INFECTIOUS AGENT DETECTION BY NUCLEIC ACID (DNA OR RNA); SEVERE ACUTE RESPIRATORY SYNDROME CORONAVIRUS 2 (SARS-COV-2) (CORONAVIRUS DISEASE [COVID-19]), AMPLIFIED PROBE TECHNIQUE, MAKING USE OF HIGH THROUGHPUT TECHNOLOGIES AS DESCRIBED BY CMS-2020-01-R: HCPCS | Performed by: FAMILY MEDICINE

## 2021-08-05 PROCEDURE — U0005 INFEC AGEN DETEC AMPLI PROBE: HCPCS | Performed by: FAMILY MEDICINE

## 2021-08-05 NOTE — TELEPHONE ENCOUNTER
Coronavirus (COVID-19) Notification    Reason for call  Notify of POSITIVE  COVID-19 lab result, assess symptoms,  review [facility] recommendations    Lab Result   Lab test for 2019-nCoV rRt-PCR or SARS-COV-2 PCR  Oropharyngeal AND/OR nasopharyngeal swabs were POSITIVE for 2019-nCoV RNA [OR] SARS-COV-2 RNA (COVID-19) RNA     We have been unable to reach Patient by phone at this time to notify of their Positive COVID-19 result.  Left voicemail message requesting a call back to [phone] [facility] for results.        POSITIVE COVID-19 Letter sent.    Radha Parikh

## 2021-08-05 NOTE — TELEPHONE ENCOUNTER
DATE:  8/5/2021   TIME OF RECEIPT FROM LAB:  12:42  LAB TEST:  Covid   LAB VALUE:  positive  RESULTS GIVEN WITH READ-BACK TO (PROVIDER):    Dr. Mendenhall  TIME LAB VALUE REPORTED TO PROVIDER:   12:42

## 2021-08-05 NOTE — TELEPHONE ENCOUNTER
Patient was positive in May, not sure if this is a true positive or residual positive test.  Could consider antibody testing (if antibodies present, not an acute infection), will need to discuss with surgeon/surgery center

## 2021-08-09 ENCOUNTER — TELEPHONE (OUTPATIENT)
Dept: FAMILY MEDICINE | Facility: OTHER | Age: 56
End: 2021-08-09

## 2021-08-09 ENCOUNTER — OFFICE VISIT (OUTPATIENT)
Dept: FAMILY MEDICINE | Facility: OTHER | Age: 56
End: 2021-08-09
Attending: SPECIALIST
Payer: COMMERCIAL

## 2021-08-09 ENCOUNTER — DOCUMENTATION ONLY (OUTPATIENT)
Dept: FAMILY MEDICINE | Facility: OTHER | Age: 56
End: 2021-08-09

## 2021-08-09 DIAGNOSIS — Z01.818 PREOPERATIVE EXAMINATION: Primary | ICD-10-CM

## 2021-08-09 LAB
SARS-COV-2 AB SERPL QL IA: POSITIVE
SARS-COV-2 RNA RESP QL NAA+PROBE: POSITIVE

## 2021-08-09 PROCEDURE — U0005 INFEC AGEN DETEC AMPLI PROBE: HCPCS | Performed by: FAMILY MEDICINE

## 2021-08-09 PROCEDURE — U0003 INFECTIOUS AGENT DETECTION BY NUCLEIC ACID (DNA OR RNA); SEVERE ACUTE RESPIRATORY SYNDROME CORONAVIRUS 2 (SARS-COV-2) (CORONAVIRUS DISEASE [COVID-19]), AMPLIFIED PROBE TECHNIQUE, MAKING USE OF HIGH THROUGHPUT TECHNOLOGIES AS DESCRIBED BY CMS-2020-01-R: HCPCS | Performed by: FAMILY MEDICINE

## 2021-08-10 NOTE — PROGRESS NOTES
COVID 19 positive.  Discussed with Jesús on August 9, 2021 8:12 PM   He was inquiring if his COVID antibody test was in process or complete.  Review of his chart does not show the lab.  He is able to quarantine.  Scheduled for left total knee revision on 8/12/2021. [facility] to sent positive result to Dr. Hand, Orthopedic Associate's office    Amrita Macedo MD

## 2021-09-12 ENCOUNTER — HEALTH MAINTENANCE LETTER (OUTPATIENT)
Age: 56
End: 2021-09-12

## 2021-12-15 ENCOUNTER — APPOINTMENT (OUTPATIENT)
Dept: FAMILY MEDICINE | Facility: OTHER | Age: 56
End: 2021-12-15
Attending: CHIROPRACTOR

## 2021-12-15 PROCEDURE — 92551 PURE TONE HEARING TEST AIR: CPT | Performed by: CHIROPRACTOR

## 2022-01-15 DIAGNOSIS — E78.2 MIXED HYPERLIPIDEMIA: ICD-10-CM

## 2022-01-15 DIAGNOSIS — I10 ESSENTIAL HYPERTENSION: ICD-10-CM

## 2022-01-17 RX ORDER — LOSARTAN POTASSIUM 50 MG/1
TABLET ORAL
Qty: 30 TABLET | Refills: 5 | Status: SHIPPED | OUTPATIENT
Start: 2022-01-17 | End: 2022-06-07

## 2022-01-17 RX ORDER — ATORVASTATIN CALCIUM 20 MG/1
TABLET, FILM COATED ORAL
Qty: 30 TABLET | Refills: 5 | Status: SHIPPED | OUTPATIENT
Start: 2022-01-17 | End: 2022-06-07

## 2022-02-02 ENCOUNTER — OFFICE VISIT (OUTPATIENT)
Dept: FAMILY MEDICINE | Facility: OTHER | Age: 57
End: 2022-02-02
Attending: NURSE PRACTITIONER
Payer: COMMERCIAL

## 2022-02-02 VITALS
SYSTOLIC BLOOD PRESSURE: 120 MMHG | BODY MASS INDEX: 31.57 KG/M2 | OXYGEN SATURATION: 97 % | HEART RATE: 83 BPM | DIASTOLIC BLOOD PRESSURE: 80 MMHG | WEIGHT: 220 LBS | TEMPERATURE: 97.7 F

## 2022-02-02 DIAGNOSIS — F51.01 PRIMARY INSOMNIA: ICD-10-CM

## 2022-02-02 DIAGNOSIS — E55.9 VITAMIN D DEFICIENCY: ICD-10-CM

## 2022-02-02 DIAGNOSIS — E78.5 HYPERLIPIDEMIA LDL GOAL <100: ICD-10-CM

## 2022-02-02 DIAGNOSIS — I10 ESSENTIAL HYPERTENSION: Primary | ICD-10-CM

## 2022-02-02 DIAGNOSIS — F41.9 ANXIETY: Chronic | ICD-10-CM

## 2022-02-02 DIAGNOSIS — J01.00 SUBACUTE MAXILLARY SINUSITIS: ICD-10-CM

## 2022-02-02 DIAGNOSIS — R07.0 THROAT PAIN: ICD-10-CM

## 2022-02-02 LAB
ALBUMIN SERPL-MCNC: 4.3 G/DL (ref 3.4–5)
ALP SERPL-CCNC: 82 U/L (ref 40–150)
ALT SERPL W P-5'-P-CCNC: 60 U/L (ref 0–70)
ANION GAP SERPL CALCULATED.3IONS-SCNC: 8 MMOL/L (ref 3–14)
AST SERPL W P-5'-P-CCNC: 51 U/L (ref 0–45)
BASOPHILS # BLD AUTO: 0 10E3/UL (ref 0–0.2)
BASOPHILS NFR BLD AUTO: 0 %
BILIRUB SERPL-MCNC: 0.6 MG/DL (ref 0.2–1.3)
BUN SERPL-MCNC: 19 MG/DL (ref 7–30)
CALCIUM SERPL-MCNC: 9.4 MG/DL (ref 8.5–10.1)
CHLORIDE BLD-SCNC: 106 MMOL/L (ref 94–109)
CHOLEST SERPL-MCNC: 139 MG/DL
CO2 SERPL-SCNC: 23 MMOL/L (ref 20–32)
CREAT SERPL-MCNC: 1.03 MG/DL (ref 0.66–1.25)
EOSINOPHIL # BLD AUTO: 0.2 10E3/UL (ref 0–0.7)
EOSINOPHIL NFR BLD AUTO: 3 %
ERYTHROCYTE [DISTWIDTH] IN BLOOD BY AUTOMATED COUNT: 15.2 % (ref 10–15)
FASTING STATUS PATIENT QL REPORTED: NO
FLUAV RNA SPEC QL NAA+PROBE: NEGATIVE
FLUBV RNA RESP QL NAA+PROBE: NEGATIVE
GFR SERPL CREATININE-BSD FRML MDRD: 85 ML/MIN/1.73M2
GLUCOSE BLD-MCNC: 95 MG/DL (ref 70–99)
HCT VFR BLD AUTO: 39.5 % (ref 40–53)
HDLC SERPL-MCNC: 49 MG/DL
HGB BLD-MCNC: 13.2 G/DL (ref 13.3–17.7)
LDLC SERPL CALC-MCNC: 60 MG/DL
LYMPHOCYTES # BLD AUTO: 1.7 10E3/UL (ref 0.8–5.3)
LYMPHOCYTES NFR BLD AUTO: 25 %
MCH RBC QN AUTO: 27.2 PG (ref 26.5–33)
MCHC RBC AUTO-ENTMCNC: 33.4 G/DL (ref 31.5–36.5)
MCV RBC AUTO: 81 FL (ref 78–100)
MONOCYTES # BLD AUTO: 0.8 10E3/UL (ref 0–1.3)
MONOCYTES NFR BLD AUTO: 12 %
NEUTROPHILS # BLD AUTO: 4.1 10E3/UL (ref 1.6–8.3)
NEUTROPHILS NFR BLD AUTO: 60 %
NONHDLC SERPL-MCNC: 90 MG/DL
PLATELET # BLD AUTO: 146 10E3/UL (ref 150–450)
POTASSIUM BLD-SCNC: 3.5 MMOL/L (ref 3.4–5.3)
PROT SERPL-MCNC: 7.1 G/DL (ref 6.8–8.8)
RBC # BLD AUTO: 4.86 10E6/UL (ref 4.4–5.9)
RSV RNA SPEC NAA+PROBE: NEGATIVE
SARS-COV-2 RNA RESP QL NAA+PROBE: NEGATIVE
SODIUM SERPL-SCNC: 137 MMOL/L (ref 133–144)
TRIGL SERPL-MCNC: 151 MG/DL
TSH SERPL DL<=0.005 MIU/L-ACNC: 2.31 MU/L (ref 0.4–4)
WBC # BLD AUTO: 6.8 10E3/UL (ref 4–11)

## 2022-02-02 PROCEDURE — 36415 COLL VENOUS BLD VENIPUNCTURE: CPT | Performed by: NURSE PRACTITIONER

## 2022-02-02 PROCEDURE — 99214 OFFICE O/P EST MOD 30 MIN: CPT | Performed by: NURSE PRACTITIONER

## 2022-02-02 PROCEDURE — 80050 GENERAL HEALTH PANEL: CPT | Performed by: NURSE PRACTITIONER

## 2022-02-02 PROCEDURE — 87637 SARSCOV2&INF A&B&RSV AMP PRB: CPT | Performed by: NURSE PRACTITIONER

## 2022-02-02 PROCEDURE — 82306 VITAMIN D 25 HYDROXY: CPT | Performed by: NURSE PRACTITIONER

## 2022-02-02 PROCEDURE — 80061 LIPID PANEL: CPT | Performed by: NURSE PRACTITIONER

## 2022-02-02 RX ORDER — AZITHROMYCIN 250 MG/1
TABLET, FILM COATED ORAL
Qty: 11 TABLET | Refills: 0 | Status: SHIPPED | OUTPATIENT
Start: 2022-02-02 | End: 2022-02-12

## 2022-02-02 ASSESSMENT — PATIENT HEALTH QUESTIONNAIRE - PHQ9
5. POOR APPETITE OR OVEREATING: NOT AT ALL
SUM OF ALL RESPONSES TO PHQ QUESTIONS 1-9: 0

## 2022-02-02 ASSESSMENT — PAIN SCALES - GENERAL: PAINLEVEL: NO PAIN (0)

## 2022-02-02 ASSESSMENT — ANXIETY QUESTIONNAIRES
2. NOT BEING ABLE TO STOP OR CONTROL WORRYING: NOT AT ALL
2. NOT BEING ABLE TO STOP OR CONTROL WORRYING: NOT AT ALL
5. BEING SO RESTLESS THAT IT IS HARD TO SIT STILL: NOT AT ALL
GAD7 TOTAL SCORE: 0
7. FEELING AFRAID AS IF SOMETHING AWFUL MIGHT HAPPEN: NOT AT ALL
4. TROUBLE RELAXING: NOT AT ALL
1. FEELING NERVOUS, ANXIOUS, OR ON EDGE: NOT AT ALL
7. FEELING AFRAID AS IF SOMETHING AWFUL MIGHT HAPPEN: NOT AT ALL
1. FEELING NERVOUS, ANXIOUS, OR ON EDGE: NOT AT ALL
3. WORRYING TOO MUCH ABOUT DIFFERENT THINGS: NOT AT ALL
5. BEING SO RESTLESS THAT IT IS HARD TO SIT STILL: NOT AT ALL
IF YOU CHECKED OFF ANY PROBLEMS ON THIS QUESTIONNAIRE, HOW DIFFICULT HAVE THESE PROBLEMS MADE IT FOR YOU TO DO YOUR WORK, TAKE CARE OF THINGS AT HOME, OR GET ALONG WITH OTHER PEOPLE: NOT DIFFICULT AT ALL
3. WORRYING TOO MUCH ABOUT DIFFERENT THINGS: NOT AT ALL
6. BECOMING EASILY ANNOYED OR IRRITABLE: NOT AT ALL
GAD7 TOTAL SCORE: 0
6. BECOMING EASILY ANNOYED OR IRRITABLE: NOT AT ALL

## 2022-02-02 NOTE — NURSING NOTE
"Chief Complaint   Patient presents with     Sinus Problem       Initial /80 (BP Location: Right arm, Patient Position: Sitting, Cuff Size: Adult Large)   Pulse 83   Temp 97.7  F (36.5  C) (Tympanic)   Wt 99.8 kg (220 lb)   SpO2 97%   BMI 31.57 kg/m   Estimated body mass index is 31.57 kg/m  as calculated from the following:    Height as of 7/9/21: 1.778 m (5' 10\").    Weight as of this encounter: 99.8 kg (220 lb).  Medication Reconciliation: complete  Lashell Waite LPN  "

## 2022-02-02 NOTE — PROGRESS NOTES
Assessment & Plan        Essential hypertension  - TSH with free T4 reflex; Future    Hyperlipidemia LDL goal <100  - Lipid Profile (Chol, Trig, HDL, LDL calc); Future  - Comprehensive metabolic panel; Future    Anxiety  - Follow-up as needed    Primary insomnia  - Follow-up as needed    Throat pain  - Symptomatic; Unknown Influenza A/B & SARS-CoV2 (COVID-19) Virus PCR Multiplex Nose; Future    Subacute maxillary sinusitis  - Symptomatic; Unknown Influenza A/B & SARS-CoV2 (COVID-19) Virus PCR Multiplex Nose; Future  - azithromycin (ZITHROMAX) 250 MG tablet; Take 2 tablets (500 mg) by mouth daily for 1 day, THEN 1 tablet (250 mg) daily for 9 days.    Vitamin D deficiency  - Vitamin D level  - D3 5000 U OTC      Self quarantine until test results return  Insure adequate fluid intake  Get plenty of rest  Monitor for temp at home, treat with OTC Tylenol or Ibuprofen per package instruction.  Humidity at home (add bacteriostatic solution to humidifier)  Please return in you do not improve  To UC or ER with persistent, worsening, or concerning symptoms        Return in about 6 months (around 8/2/2022).      Laury Trivedi CNP  [facility] - RADHA CULP is a 56 year old who presents for the following health issues       Acute Illness  Acute illness concerns: sinus problem  Onset/Duration: 3 weeks  Symptoms:  Fever: no  Chills/Sweats: no  Headache (location?): no  Sinus Pressure: YES  Conjunctivitis:  no  Ear Pain: no  Rhinorrhea: no  Congestion: YES  Sore Throat: YES  Cough: YES  Wheeze: no  Decreased Appetite: no  Nausea: no  Vomiting: no  Diarrhea: no  Dysuria/Freq.: no  Dysuria or Hematuria: no  Fatigue/Achiness: no  Sick/Strep Exposure: no  Therapies tried and outcome: none        Hyperlipidemia Follow-Up    Are you regularly taking any medication or supplement to lower your cholesterol?   Yes- statin    Are you having muscle aches or other side effects that you think could be caused by your  cholesterol lowering medication?  No        Hypertension Follow-up    Do you check your blood pressure regularly outside of the clinic? No     Are you following a low salt diet? No    Are your blood pressures ever more than 140 on the top number (systolic) OR more   than 90 on the bottom number (diastolic), for example 140/90? No        Anxiety Follow-Up    How are you doing with your anxiety since your last visit? Improved     Are you having other symptoms that might be associated with anxiety? No    Have you had a significant life event? No     Are you feeling depressed? No    Do you have any concerns with your use of alcohol or other drugs? No      Insomnia    Duration: years    Description  Frequency of insomnia:  occasionally  Time to fall asleep: 15 minutes  Middle of night awakening:  occasionally  Early morning awakening:  occasionally    Accompanying signs and symptoms:  none    History  Similar episodes in past:  YES  Previous evaluation/sleep study:  YES    Precipitating or alleviating factors:  New stressful situation: no   Caffeine intake after lunchtime: no   OTC decongestants: no   Any new medications: no     Therapies tried and outcome: none         Social History     Tobacco Use     Smoking status: Never Smoker     Smokeless tobacco: Never Used   Substance Use Topics     Alcohol use: Yes     Comment: occasional     Drug use: No         GIOVANY-7 SCORE 7/9/2021 2/2/2022 2/2/2022   Total Score 1 0 0       PHQ 6/22/2020 7/9/2021 2/2/2022   PHQ-9 Total Score 0 3 0   Q9: Thoughts of better off dead/self-harm past 2 weeks Not at all Not at all Not at all           Patient Active Problem List   Diagnosis     Anxiety     Chronic midline low back pain without sciatica     Primary insomnia     Vitamin D deficiency     Observed sleep apnea     Sacroiliac joint dysfunction     History of colonic polyps     Impingement syndrome of left shoulder     History of COVID-19     Essential hypertension     Hyperlipidemia  LDL goal <100     Past Surgical History:   Procedure Laterality Date     AS COLONOSCOPY W BX FORCEP/CAUTERY REMOVAL KENYA/POLYP/LESION N/A 01/01/1999    stomache pain/family hx     COLONOSCOPY  01/01/2011    f/u polyectomy/ family HX crohns     COLONOSCOPY N/A 12/20/2018    Procedure: COLONOSCOPY with Polypectomy;  Surgeon: Yared Vilchis MD;  Location: [facility] OR     ORTHOPEDIC SURGERY      shoulder surgery     RECESSION RESECTION (REPAIR STRABISMUS) BILATERAL  10/18/2011    Procedure:RECESSION RESECTION (REPAIR STRABISMUS) BILATERAL; Strabismus Repair Right; Surgeon:MERCEDEZ CHAUHAN; Location: OR       Social History     Tobacco Use     Smoking status: Never Smoker     Smokeless tobacco: Never Used   Substance Use Topics     Alcohol use: Yes     Comment: occasional     Family History   Problem Relation Age of Onset     Cancer Mother         Brain - Cause of Death     Crohn's Disease Father         Cause of Death     Crohn's Disease Sister              Current Outpatient Medications   Medication Sig Dispense Refill     azithromycin (ZITHROMAX) 250 MG tablet Take 2 tablets (500 mg) by mouth daily for 1 day, THEN 1 tablet (250 mg) daily for 9 days. 11 tablet 0     ACETAMINOPHEN EXTRA STRENGTH 500 MG tablet        atorvastatin (LIPITOR) 20 MG tablet TAKE 1 TABLET BY MOUTH EVERY DAY 30 tablet 5     losartan (COZAAR) 50 MG tablet TAKE 1 TABLET BY MOUTH EVERY DAY 30 tablet 5         Allergies   Allergen Reactions     No Clinical Screening - See Comments      Seasonal allergies         Recent Labs   Lab Test 08/04/21  1412 07/09/21  1356 05/17/21  1609 06/22/20  0903 05/22/19  0932 01/16/18  0730 12/17/14  0902   LDL  --  93  --  99  --  93  --    HDL  --  39*  --  42  --  43  --    TRIG  --  277*  --  234*  --  233*  --    ALT 52 54 47 58   < >  --   --    CR 1.05 0.95 0.99 0.87   < >  --   --    GFRESTIMATED 78 88 84 >90   < >  --   --    GFRESTBLACK  --  >90 >90 >90   < >  --   --    POTASSIUM 3.7 3.7 3.7 4.0   < >  --    --    TSH  --   --   --   --   --  2.80 2.90    < > = values in this interval not displayed.          BP Readings from Last 3 Encounters:   02/02/22 120/80   08/04/21 136/78   07/09/21 (!) 143/80    Wt Readings from Last 3 Encounters:   02/02/22 99.8 kg (220 lb)   08/04/21 94.3 kg (208 lb)   07/09/21 95.7 kg (211 lb)                 Review of Systems   Constitutional, HEENT, cardiovascular, pulmonary, GI, , musculoskeletal, neuro, skin, endocrine and psych systems are negative, except as otherwise noted.          Objective    /80 (BP Location: Right arm, Patient Position: Sitting, Cuff Size: Adult Large)   Pulse 83   Temp 97.7  F (36.5  C) (Tympanic)   Wt 99.8 kg (220 lb)   SpO2 97%   BMI 31.57 kg/m    Body mass index is 31.57 kg/m .         Physical Exam   GENERAL: healthy, alert and no distress  EYES: Eyes grossly normal to inspection, PERRL and conjunctivae and sclerae normal  HENT: tonsillar erythema and sinuses: maxillary, frontal tenderness on bilateral, PND  NECK: no adenopathy, no asymmetry, masses, or scars and thyroid normal to palpation  RESP: lungs clear to auscultation - no rales, rhonchi or wheezes  CV: regular rate and rhythm, normal S1 S2, no S3 or S4, no murmur, click or rub, no peripheral edema and peripheral pulses strong  MS: no gross musculoskeletal defects noted, no edema  SKIN: no suspicious lesions or rashes  PSYCH: mentation appears normal, affect normal/bright

## 2022-02-02 NOTE — PATIENT INSTRUCTIONS
Assessment & Plan        Essential hypertension  - TSH with free T4 reflex; Future    Hyperlipidemia LDL goal <100  - Lipid Profile (Chol, Trig, HDL, LDL calc); Future  - Comprehensive metabolic panel; Future    Anxiety  - Follow-up as needed    Primary insomnia  - Follow-up as needed    Throat pain  - Symptomatic; Unknown Influenza A/B & SARS-CoV2 (COVID-19) Virus PCR Multiplex Nose; Future    Subacute maxillary sinusitis  - Symptomatic; Unknown Influenza A/B & SARS-CoV2 (COVID-19) Virus PCR Multiplex Nose; Future  - azithromycin (ZITHROMAX) 250 MG tablet; Take 2 tablets (500 mg) by mouth daily for 1 day, THEN 1 tablet (250 mg) daily for 9 days.    Vitamin D deficiency  - Vitamin D level  - D3 5000 U OTC      Self quarantine until test results return  Insure adequate fluid intake  Get plenty of rest  Monitor for temp at home, treat with OTC Tylenol or Ibuprofen per package instruction.  Humidity at home (add bacteriostatic solution to humidifier)  Please return in you do not improve  To UC or ER with persistent, worsening, or concerning symptoms        Return in about 6 months (around 8/2/2022).      Laury Trivedi, DEVANTE  [facility]

## 2022-02-03 ASSESSMENT — ANXIETY QUESTIONNAIRES: GAD7 TOTAL SCORE: 0

## 2022-02-04 LAB — DEPRECATED CALCIDIOL+CALCIFEROL SERPL-MC: 44 UG/L (ref 20–75)

## 2022-05-04 ENCOUNTER — HOSPITAL ENCOUNTER (EMERGENCY)
Facility: HOSPITAL | Age: 57
Discharge: HOME | End: 2022-05-04
Attending: NURSE PRACTITIONER | Admitting: NURSE PRACTITIONER
Payer: COMMERCIAL

## 2022-05-04 VITALS
SYSTOLIC BLOOD PRESSURE: 176 MMHG | RESPIRATION RATE: 16 BRPM | OXYGEN SATURATION: 97 % | HEART RATE: 77 BPM | TEMPERATURE: 98.1 F | DIASTOLIC BLOOD PRESSURE: 74 MMHG

## 2022-05-04 DIAGNOSIS — L02.212 ABSCESS OF BACK: ICD-10-CM

## 2022-05-04 PROCEDURE — 87070 CULTURE OTHR SPECIMN AEROBIC: CPT | Performed by: NURSE PRACTITIONER

## 2022-05-04 PROCEDURE — 10061 I&D ABSCESS COMP/MULTIPLE: CPT

## 2022-05-04 PROCEDURE — 10061 I&D ABSCESS COMP/MULTIPLE: CPT | Performed by: NURSE PRACTITIONER

## 2022-05-04 PROCEDURE — G0463 HOSPITAL OUTPT CLINIC VISIT: HCPCS | Mod: 25

## 2022-05-04 PROCEDURE — 99213 OFFICE O/P EST LOW 20 MIN: CPT | Mod: 25 | Performed by: NURSE PRACTITIONER

## 2022-05-04 RX ORDER — SULFAMETHOXAZOLE/TRIMETHOPRIM 800-160 MG
1 TABLET ORAL 2 TIMES DAILY
Qty: 14 TABLET | Refills: 0 | Status: SHIPPED | OUTPATIENT
Start: 2022-05-04 | End: 2022-08-02

## 2022-05-04 ASSESSMENT — ENCOUNTER SYMPTOMS
ACTIVITY CHANGE: 1
CHILLS: 0
ROS SKIN COMMENTS: CYST ON BACK
VOMITING: 0
COLOR CHANGE: 1
FEVER: 0
NAUSEA: 0

## 2022-05-04 NOTE — ED PROVIDER NOTES
History     Chief Complaint   Patient presents with     Cyst     HPI  Asif Leon is a 57 year old male who presents with abscess on his left upper back that has been hurting for the past 2 days.  No OTC medications have been taken or home interventions applied.  History of hypertension.  Did not take his medications today. Denies fevers, chills, nausea, vomiting, diarrhea, and shortness of breath.    Allergies:  Allergies   Allergen Reactions     No Clinical Screening - See Comments      Seasonal allergies       Problem List:    Patient Active Problem List    Diagnosis Date Noted     Hyperlipidemia LDL goal <100 02/02/2022     Priority: Medium     History of COVID-19 07/09/2021     Priority: Medium     Essential hypertension 07/09/2021     Priority: Medium     Impingement syndrome of left shoulder 01/31/2019     Priority: Medium     Observed sleep apnea 12/10/2018     Priority: Medium     Sacroiliac joint dysfunction 12/10/2018     Priority: Medium     History of colonic polyps 12/10/2018     Priority: Medium     Vitamin D deficiency 04/25/2018     Priority: Medium     Anxiety 01/15/2018     Priority: Medium     Chronic midline low back pain without sciatica 01/15/2018     Priority: Medium     Primary insomnia 01/15/2018     Priority: Medium        Past Medical History:    Past Medical History:   Diagnosis Date     Depressive disorder      History of COVID-19 7/9/2021     Hyperlipidemia LDL goal <100 2/2/2022     TGA (transient global amnesia) 01/04/2018       Past Surgical History:    Past Surgical History:   Procedure Laterality Date     AS COLONOSCOPY W BX FORCEP/CAUTERY REMOVAL KENYA/POLYP/LESION N/A 01/01/1999    stomache pain/family hx     COLONOSCOPY  01/01/2011    f/u polyectomy/ family HX crohns     COLONOSCOPY N/A 12/20/2018    Procedure: COLONOSCOPY with Polypectomy;  Surgeon: Yared Vilchis MD;  Location: [facility]     ORTHOPEDIC SURGERY      shoulder surgery     RECESSION RESECTION (REPAIR  STRABISMUS) BILATERAL  10/18/2011    Procedure:RECESSION RESECTION (REPAIR STRABISMUS) BILATERAL; Strabismus Repair Right; Surgeon:MERCEDEZ CHAUHAN; Location:[facility]       Family History:    Family History   Problem Relation Age of Onset     Cancer Mother         Brain - Cause of Death     Crohn's Disease Father         Cause of Death     Crohn's Disease Sister        Social History:  Marital Status:   [2]  Social History     Tobacco Use     Smoking status: Never Smoker     Smokeless tobacco: Never Used   Substance Use Topics     Alcohol use: Yes     Comment: occasional     Drug use: No        Medications:    ACETAMINOPHEN EXTRA STRENGTH 500 MG tablet  atorvastatin (LIPITOR) 20 MG tablet  losartan (COZAAR) 50 MG tablet  sulfamethoxazole-trimethoprim (BACTRIM DS) 800-160 MG tablet          Review of Systems   Constitutional: Positive for activity change. Negative for chills and fever.   Gastrointestinal: Negative for nausea and vomiting.   Skin: Positive for color change.        Cyst on back       Physical Exam   BP: 176/74  Pulse: 77  Temp: 98.1  F (36.7  C)  Resp: 16  SpO2: 97 %      Physical Exam  Vitals and nursing note reviewed.   Constitutional:       General: He is in acute distress (mild to moderate).   HENT:      Head: Normocephalic.   Cardiovascular:      Rate and Rhythm: Normal rate.   Pulmonary:      Effort: Pulmonary effort is normal.       Musculoskeletal:         General: Tenderness present.   Skin:     General: Skin is warm and dry.      Capillary Refill: Capillary refill takes less than 2 seconds.      Findings: Erythema present.   Neurological:      Mental Status: He is alert and oriented to person, place, and time.   Psychiatric:         Behavior: Behavior normal.         ED Course                 [facility]    PROCEDURE: -Incision/Drainage    Date/Time: 5/4/2022 7:30 PM  Performed by: Fatuma Loza CNP  Authorized by: Fatuma Loza CNP     Risks, benefits and  "alternatives discussed.      LOCATION:      Type:  Abscess    Size:  1 cm    Location:  Trunk    Trunk location:  Back    PRE-PROCEDURE DETAILS:     Skin preparation:  Chloraprep    PROCEDURE TYPE:     Complexity:  Simple    ANESTHESIA (see MAR for exact dosages):     Anesthesia method:  Local infiltration    Local anesthetic:  Lidocaine 2% w/o epi (5 ml)    PROCEDURE DETAILS:     Needle aspiration: no      Incision types:  Stab incision    Incision depth:  Dermal    Scalpel blade:  11    Wound management:  Probed and deloculated and irrigated with saline    Drainage:  Purulent and bloody    Drainage amount:  Copious    Wound treatment:  Drain placed    Packing materials:  1/2 in gauze    Amount 1/2\":  1    PROCEDURE  Describe Procedure: Consent obtained. Time out completed. back Region cleaned with chloraprep. Stab incision 1/8th inch made  with #11 blade and area probed and deloculated. copious amount of purulent/bloody secretions obtained. Culture obtained. Irrigated with 20 ml NS. One inch of 1/2th inch packing placed in wound and dry dressing placed.                  No results found for this or any previous visit (from the past 24 hour(s)).    Medications - No data to display    Assessments & Plan (with Medical Decision Making)     I have reviewed the nursing notes.    I have reviewed the findings, diagnosis, plan and need for follow up with the patient.  (L02.816) Abscess of back  Comment: 57 year old male who presents with abscess on his left upper back that has been hurting for the past 2 days.  No OTC medications have been taken or home interventions applied.  History of hypertension.  Did not take his medications today. Denies fevers, chills, nausea, vomiting, diarrhea, and shortness of breath.    MDM: 1 cm abscess draining purulent secretions left upper back  See procedure note.    Wound culture sent    Plan: Bactrim bid for seven days. Education provided and/or discussed for this/these medication and " abscess.  wice daily dressing changes for the next 48 to 72 hours. You may shower. If you have increased pain, redness at wound site, fevers, or abnormal drainage (purulent/pus) you need to see your primary care provider or return to Urgent Care/ER immediately. Acetaminophen/tylenol  or ibuprofen for pain. Complete all antibiotics even if feeling better.  Take antibiotics with food unless instructed otherwise. Yogurt or probiotics may help decrease stomach upset and diarrhea.  May remove packing in 24 to 48 hours.  These discharge instructions and medications were reviewed with him and understanding verbalized.    This document was prepared using a combination of typing and voice generated software.  While every attempt was made for accuracy, spelling and grammatical errors may exist.    New Prescriptions    SULFAMETHOXAZOLE-TRIMETHOPRIM (BACTRIM DS) 800-160 MG TABLET    Take 1 tablet by mouth 2 times daily       Final diagnoses:   Abscess of back       5/4/2022   [facility]       Fatuma Loza, CNP  05/04/22 2050

## 2022-05-04 NOTE — ED TRIAGE NOTES
"Pt presents with cyst on back that has reappeared for the second time. Pt states it opened up on him today. Pt states it is a little \"tender\".       "

## 2022-05-05 NOTE — DISCHARGE INSTRUCTIONS
do twice daily dressing changes for the next 48 to 72 hours. You may shower. If you have increased pain, redness at wound site, fevers, or abnormal drainage (purulent/pus) you need to see your primary care provider or return to Urgent Care/ER immediately. Acetaminophen/tylenol  or ibuprofen for pain. Complete all antibiotics even if feeling better.  Take antibiotics with food unless instructed otherwise. Yogurt or probiotics may help decrease stomach upset and diarrhea.  May remove packing in 24 to 48 hours.

## 2022-05-09 LAB
BACTERIA ABSC ANAEROBE+AEROBE CULT: ABNORMAL
GRAM STAIN RESULT: ABNORMAL
GRAM STAIN RESULT: ABNORMAL

## 2022-06-05 DIAGNOSIS — I10 ESSENTIAL HYPERTENSION: ICD-10-CM

## 2022-06-05 DIAGNOSIS — E78.2 MIXED HYPERLIPIDEMIA: ICD-10-CM

## 2022-06-07 RX ORDER — LOSARTAN POTASSIUM 50 MG/1
TABLET ORAL
Qty: 30 TABLET | Refills: 0 | Status: SHIPPED | OUTPATIENT
Start: 2022-06-07 | End: 2022-07-08

## 2022-06-07 RX ORDER — ATORVASTATIN CALCIUM 20 MG/1
TABLET, FILM COATED ORAL
Qty: 30 TABLET | Refills: 7 | Status: SHIPPED | OUTPATIENT
Start: 2022-06-07 | End: 2023-05-03

## 2022-06-07 NOTE — TELEPHONE ENCOUNTER
Cozaar      Last Written Prescription Date:  4.28.22  Last Fill Quantity: #30,   # refills: 0  Last Office Visit: 2.2.22  Future Office visit:    Next 5 appointments (look out 90 days)    Aug 02, 2022  3:30 PM  (Arrive by 3:15 PM)  SHORT with Laury Trivedi CNP  [facility] ([facility] ) [address]  [facility] [address]  [phone]           Routing refill request to provider for review/approval because:

## 2022-07-06 DIAGNOSIS — I10 ESSENTIAL HYPERTENSION: ICD-10-CM

## 2022-07-08 RX ORDER — LOSARTAN POTASSIUM 50 MG/1
TABLET ORAL
Qty: 30 TABLET | Refills: 0 | Status: SHIPPED | OUTPATIENT
Start: 2022-07-08 | End: 2022-08-04

## 2022-07-08 NOTE — TELEPHONE ENCOUNTER
losartan      Last Written Prescription Date:  6/7/22  Last Fill Quantity: 30,   # refills: 0  Last Office Visit: 2/2/22  Future Office visit:    Next 5 appointments (look out 90 days)    Aug 02, 2022  3:30 PM  (Arrive by 3:15 PM)  SHORT with Laury Trivedi CNP  [facility] ([facility] ) [address]  [facility] [address]  [phone]

## 2022-08-02 ENCOUNTER — OFFICE VISIT (OUTPATIENT)
Dept: FAMILY MEDICINE | Facility: OTHER | Age: 57
End: 2022-08-02
Attending: NURSE PRACTITIONER
Payer: COMMERCIAL

## 2022-08-02 VITALS
RESPIRATION RATE: 16 BRPM | WEIGHT: 213.4 LBS | HEART RATE: 63 BPM | OXYGEN SATURATION: 96 % | TEMPERATURE: 97 F | DIASTOLIC BLOOD PRESSURE: 70 MMHG | SYSTOLIC BLOOD PRESSURE: 118 MMHG | BODY MASS INDEX: 30.62 KG/M2

## 2022-08-02 DIAGNOSIS — Z12.5 SCREENING FOR PROSTATE CANCER: ICD-10-CM

## 2022-08-02 DIAGNOSIS — R06.83 SNORING: ICD-10-CM

## 2022-08-02 DIAGNOSIS — I10 ESSENTIAL HYPERTENSION: Primary | ICD-10-CM

## 2022-08-02 DIAGNOSIS — F41.9 ANXIETY: ICD-10-CM

## 2022-08-02 DIAGNOSIS — G47.30 OBSERVED SLEEP APNEA: ICD-10-CM

## 2022-08-02 DIAGNOSIS — R53.83 FATIGUE, UNSPECIFIED TYPE: ICD-10-CM

## 2022-08-02 DIAGNOSIS — E78.5 HYPERLIPIDEMIA LDL GOAL <100: ICD-10-CM

## 2022-08-02 LAB
ALBUMIN SERPL-MCNC: 4.2 G/DL (ref 3.4–5)
ALP SERPL-CCNC: 95 U/L (ref 40–150)
ALT SERPL W P-5'-P-CCNC: 48 U/L (ref 0–70)
ANION GAP SERPL CALCULATED.3IONS-SCNC: 5 MMOL/L (ref 3–14)
AST SERPL W P-5'-P-CCNC: 40 U/L (ref 0–45)
BASOPHILS # BLD AUTO: 0 10E3/UL (ref 0–0.2)
BASOPHILS NFR BLD AUTO: 0 %
BILIRUB SERPL-MCNC: 0.5 MG/DL (ref 0.2–1.3)
BUN SERPL-MCNC: 20 MG/DL (ref 7–30)
CALCIUM SERPL-MCNC: 9.4 MG/DL (ref 8.5–10.1)
CHLORIDE BLD-SCNC: 108 MMOL/L (ref 94–109)
CHOLEST SERPL-MCNC: 133 MG/DL
CO2 SERPL-SCNC: 24 MMOL/L (ref 20–32)
CREAT SERPL-MCNC: 0.99 MG/DL (ref 0.66–1.25)
EOSINOPHIL # BLD AUTO: 0.2 10E3/UL (ref 0–0.7)
EOSINOPHIL NFR BLD AUTO: 2 %
ERYTHROCYTE [DISTWIDTH] IN BLOOD BY AUTOMATED COUNT: 14.8 % (ref 10–15)
FASTING STATUS PATIENT QL REPORTED: NO
GFR SERPL CREATININE-BSD FRML MDRD: 88 ML/MIN/1.73M2
GLUCOSE BLD-MCNC: 99 MG/DL (ref 70–99)
HCT VFR BLD AUTO: 38.6 % (ref 40–53)
HDLC SERPL-MCNC: 47 MG/DL
HGB BLD-MCNC: 13.5 G/DL (ref 13.3–17.7)
LDLC SERPL CALC-MCNC: 64 MG/DL
LYMPHOCYTES # BLD AUTO: 1.8 10E3/UL (ref 0.8–5.3)
LYMPHOCYTES NFR BLD AUTO: 27 %
MCH RBC QN AUTO: 28.7 PG (ref 26.5–33)
MCHC RBC AUTO-ENTMCNC: 35 G/DL (ref 31.5–36.5)
MCV RBC AUTO: 82 FL (ref 78–100)
MONOCYTES # BLD AUTO: 0.7 10E3/UL (ref 0–1.3)
MONOCYTES NFR BLD AUTO: 10 %
NEUTROPHILS # BLD AUTO: 4 10E3/UL (ref 1.6–8.3)
NEUTROPHILS NFR BLD AUTO: 61 %
NONHDLC SERPL-MCNC: 86 MG/DL
PLATELET # BLD AUTO: 160 10E3/UL (ref 150–450)
POTASSIUM BLD-SCNC: 4 MMOL/L (ref 3.4–5.3)
PROT SERPL-MCNC: 7.5 G/DL (ref 6.8–8.8)
PSA SERPL-MCNC: 4 UG/L (ref 0–4)
RBC # BLD AUTO: 4.7 10E6/UL (ref 4.4–5.9)
SODIUM SERPL-SCNC: 137 MMOL/L (ref 133–144)
TRIGL SERPL-MCNC: 112 MG/DL
WBC # BLD AUTO: 6.6 10E3/UL (ref 4–11)

## 2022-08-02 PROCEDURE — 36415 COLL VENOUS BLD VENIPUNCTURE: CPT | Performed by: NURSE PRACTITIONER

## 2022-08-02 PROCEDURE — 99214 OFFICE O/P EST MOD 30 MIN: CPT | Performed by: NURSE PRACTITIONER

## 2022-08-02 PROCEDURE — 85025 COMPLETE CBC W/AUTO DIFF WBC: CPT | Performed by: NURSE PRACTITIONER

## 2022-08-02 PROCEDURE — 80061 LIPID PANEL: CPT | Performed by: NURSE PRACTITIONER

## 2022-08-02 PROCEDURE — G0103 PSA SCREENING: HCPCS | Performed by: NURSE PRACTITIONER

## 2022-08-02 PROCEDURE — 80053 COMPREHEN METABOLIC PANEL: CPT | Performed by: NURSE PRACTITIONER

## 2022-08-02 ASSESSMENT — ANXIETY QUESTIONNAIRES
3. WORRYING TOO MUCH ABOUT DIFFERENT THINGS: NOT AT ALL
GAD7 TOTAL SCORE: 0
1. FEELING NERVOUS, ANXIOUS, OR ON EDGE: NOT AT ALL
2. NOT BEING ABLE TO STOP OR CONTROL WORRYING: NOT AT ALL
4. TROUBLE RELAXING: NOT AT ALL
7. FEELING AFRAID AS IF SOMETHING AWFUL MIGHT HAPPEN: NOT AT ALL
5. BEING SO RESTLESS THAT IT IS HARD TO SIT STILL: NOT AT ALL
GAD7 TOTAL SCORE: 0
6. BECOMING EASILY ANNOYED OR IRRITABLE: NOT AT ALL

## 2022-08-02 ASSESSMENT — PAIN SCALES - GENERAL: PAINLEVEL: NO PAIN (0)

## 2022-08-02 ASSESSMENT — PATIENT HEALTH QUESTIONNAIRE - PHQ9: SUM OF ALL RESPONSES TO PHQ QUESTIONS 1-9: 2

## 2022-08-02 NOTE — LETTER
September 14, 2022      ROBBI Leon  [address] [place] [id number]        Dear ,    We are writing to inform you of your test results.      Labs look great     Resulted Orders   PSA, screen   Result Value Ref Range    Prostate Specific Antigen Screen 4.00 0.00 - 4.00 ug/L    Narrative    Assay Method:  Chemiluminescence using Siemens   Vista analyzer.   Comprehensive metabolic panel   Result Value Ref Range    Sodium 137 133 - 144 mmol/L    Potassium 4.0 3.4 - 5.3 mmol/L    Chloride 108 94 - 109 mmol/L    Carbon Dioxide (CO2) 24 20 - 32 mmol/L    Anion Gap 5 3 - 14 mmol/L    Urea Nitrogen 20 7 - 30 mg/dL    Creatinine 0.99 0.66 - 1.25 mg/dL    Calcium 9.4 8.5 - 10.1 mg/dL    Glucose 99 70 - 99 mg/dL    Alkaline Phosphatase 95 40 - 150 U/L    AST 40 0 - 45 U/L    ALT 48 0 - 70 U/L    Protein Total 7.5 6.8 - 8.8 g/dL    Albumin 4.2 3.4 - 5.0 g/dL    Bilirubin Total 0.5 0.2 - 1.3 mg/dL    GFR Estimate 88 >60 mL/min/1.73m2      Comment:      Effective December 21, 2021 eGFRcr in adults is calculated using the 2021 CKD-EPI creatinine equation which includes age and gender (Glenny lim al., NEJM, DOI: 10.1056/RBIEih5834113)   Lipid Profile (Chol, Trig, HDL, LDL calc)   Result Value Ref Range    Cholesterol 133 <200 mg/dL    Triglycerides 112 <150 mg/dL    Direct Measure HDL 47 >=40 mg/dL    LDL Cholesterol Calculated 64 <=100 mg/dL    Non HDL Cholesterol 86 <130 mg/dL    Patient Fasting > 8hrs? No     Narrative    Cholesterol  Desirable:  <200 mg/dL    Triglycerides  Normal:  Less than 150 mg/dL  Borderline High:  150-199 mg/dL  High:  200-499 mg/dL  Very High:  Greater than or equal to 500 mg/dL    Direct Measure HDL  Female:  Greater than or equal to 50 mg/dL   Male:  Greater than or equal to 40 mg/dL    LDL Cholesterol  Desirable:  <100mg/dL  Above Desirable:  100-129 mg/dL   Borderline High:  130-159 mg/dL   High:  160-189 mg/dL   Very High:  >= 190 mg/dL    Non HDL Cholesterol  Desirable:  130  mg/dL  Above Desirable:  130-159 mg/dL  Borderline High:  160-189 mg/dL  High:  190-219 mg/dL  Very High:  Greater than or equal to 220 mg/dL   CBC with platelets and differential   Result Value Ref Range    WBC Count 6.6 4.0 - 11.0 10e3/uL    RBC Count 4.70 4.40 - 5.90 10e6/uL    Hemoglobin 13.5 13.3 - 17.7 g/dL    Hematocrit 38.6 (L) 40.0 - 53.0 %    MCV 82 78 - 100 fL    MCH 28.7 26.5 - 33.0 pg    MCHC 35.0 31.5 - 36.5 g/dL    RDW 14.8 10.0 - 15.0 %    Platelet Count 160 150 - 450 10e3/uL    % Neutrophils 61 %    % Lymphocytes 27 %    % Monocytes 10 %    % Eosinophils 2 %    % Basophils 0 %    Absolute Neutrophils 4.0 1.6 - 8.3 10e3/uL    Absolute Lymphocytes 1.8 0.8 - 5.3 10e3/uL    Absolute Monocytes 0.7 0.0 - 1.3 10e3/uL    Absolute Eosinophils 0.2 0.0 - 0.7 10e3/uL    Absolute Basophils 0.0 0.0 - 0.2 10e3/uL       If you have any questions or concerns, please call the clinic at the number listed above.       Sincerely,      Laury Trivedi, CNP

## 2022-08-02 NOTE — PROGRESS NOTES
Assessment & Plan       Essential hypertension  - Comprehensive metabolic panel    Hyperlipidemia LDL goal <100  - Comprehensive metabolic panel  - Lipid Profile (Chol, Trig, HDL, LDL calc)    Anxiety  - Follow-up as needed    Screening for prostate cancer  - PSA, screen    Fatigue  - CBC with platelets and differential    Snoring, sleep apnea  - Sleep study ordered      Add Vitamin D3 2000 U daily  Multiple vitamin daily        Return in about 6 months (around 2/2/2023).      Laury Trivedi, DEVANTE  [facility] - RADHA CULP is a 57 year old presenting for the following health issues:  Chronic Disease Management        Hyperlipidemia Follow-Up    Are you regularly taking any medication or supplement to lower your cholesterol?   Yes- Atorvastatin 20 mg daily    Are you having muscle aches or other side effects that you think could be caused by your cholesterol lowering medication?  No      Hypertension Follow-up    Do you check your blood pressure regularly outside of the clinic? No     Are you following a low salt diet? No    Are your blood pressures ever more than 140 on the top number (systolic) OR more   than 90 on the bottom number (diastolic), for example 140/90? Yes      Anxiety Follow-Up    How are you doing with your anxiety since your last visit? Improved     Are you having other symptoms that might be associated with anxiety? No    Have you had a significant life event? No     Are you feeling depressed? No    Do you have any concerns with your use of alcohol or other drugs? No         Social History     Tobacco Use     Smoking status: Never Smoker     Smokeless tobacco: Never Used   Substance Use Topics     Alcohol use: Yes     Comment: occasional     Drug use: No         GIOVANY-7 SCORE 2/2/2022 2/2/2022 8/2/2022   Total Score 0 0 0         PHQ 7/9/2021 2/2/2022 8/2/2022   PHQ-9 Total Score 3 0 2   Q9: Thoughts of better off dead/self-harm past 2 weeks Not at all Not at all Not at all            Patient Active Problem List   Diagnosis     Anxiety     Chronic midline low back pain without sciatica     Primary insomnia     Vitamin D deficiency     Observed sleep apnea     Sacroiliac joint dysfunction     History of colonic polyps     Impingement syndrome of left shoulder     History of COVID-19     Essential hypertension     Hyperlipidemia LDL goal <100     Past Surgical History:   Procedure Laterality Date     AS COLONOSCOPY W BX FORCEP/CAUTERY REMOVAL KENYA/POLYP/LESION N/A 01/01/1999    stomache pain/family hx     COLONOSCOPY  01/01/2011    f/u polyectomy/ family HX crohns     COLONOSCOPY N/A 12/20/2018    Procedure: COLONOSCOPY with Polypectomy;  Surgeon: Yared Vilchis MD;  Location[facility]     ORTHOPEDIC SURGERY      shoulder surgery     RECESSION RESECTION (REPAIR STRABISMUS) BILATERAL  10/18/2011    Procedure:RECESSION RESECTION (REPAIR STRABISMUS) BILATERAL; Strabismus Repair Right; Surgeon:MERCEDEZ CHAUHAN; Location: OR       Social History     Tobacco Use     Smoking status: Never Smoker     Smokeless tobacco: Never Used   Substance Use Topics     Alcohol use: Yes     Comment: occasional     Family History   Problem Relation Age of Onset     Cancer Mother         Brain - Cause of Death     Crohn's Disease Father         Cause of Death     Crohn's Disease Sister            Current Outpatient Medications   Medication Sig Dispense Refill     ACETAMINOPHEN EXTRA STRENGTH 500 MG tablet        atorvastatin (LIPITOR) 20 MG tablet TAKE 1 TABLET BY MOUTH EVERY DAY 30 tablet 7     losartan (COZAAR) 50 MG tablet TAKE 1 TABLET BY MOUTH EVERY DAY 30 tablet 0       Allergies   Allergen Reactions     No Clinical Screening - See Comments      Seasonal allergies       Recent Labs   Lab Test 02/02/22  1419 08/04/21  1412 07/09/21  1356 05/17/21  1609 06/22/20  0903 05/22/19  0932 01/16/18  0730   LDL 60  --  93  --  99  --  93   HDL 49  --  39*  --  42  --  43   TRIG 151*  --  277*  --  234*  --  233*    ALT 60 52 54 47 58   < >  --    CR 1.03 1.05 0.95 0.99 0.87   < >  --    GFRESTIMATED 85 78 88 84 >90   < >  --    GFRESTBLACK  --   --  >90 >90 >90   < >  --    POTASSIUM 3.5 3.7 3.7 3.7 4.0   < >  --    TSH 2.31  --   --   --   --   --  2.80    < > = values in this interval not displayed.        BP Readings from Last 3 Encounters:   08/02/22 118/70   05/04/22 176/74   02/02/22 120/80    Wt Readings from Last 3 Encounters:   08/02/22 96.8 kg (213 lb 6.4 oz)   02/02/22 99.8 kg (220 lb)   08/04/21 94.3 kg (208 lb)                Review of Systems   Constitutional, HEENT, cardiovascular, pulmonary, GI, , musculoskeletal, neuro, skin, endocrine and psych systems are negative, except as otherwise noted.          Objective    /70 (BP Location: Left arm, Patient Position: Sitting, Cuff Size: Adult Large)   Pulse 63   Temp 97  F (36.1  C) (Tympanic)   Resp 16   Wt 96.8 kg (213 lb 6.4 oz)   SpO2 96%   BMI 30.62 kg/m    Body mass index is 30.62 kg/m .         Physical Exam   GENERAL: healthy, alert and no distress  HENT: ear canals and TM's normal, nose and mouth without ulcers or lesions  NECK: no adenopathy, no asymmetry, masses, or scars and thyroid normal to palpation  RESP: lungs clear to auscultation - no rales, rhonchi or wheezes  CV: regular rate and rhythm, normal S1 S2, no S3 or S4, no murmur, click or rub, no peripheral edema and peripheral pulses strong  SKIN: no suspicious lesions or rashes  PSYCH: mentation appears normal, affect normal/bright

## 2022-08-02 NOTE — PATIENT INSTRUCTIONS
Assessment & Plan       Essential hypertension  - Comprehensive metabolic panel    Hyperlipidemia LDL goal <100  - Comprehensive metabolic panel  - Lipid Profile (Chol, Trig, HDL, LDL calc)    Anxiety  - Follow-up as needed    Screening for prostate cancer  - PSA, screen    Fatigue  - CBC with platelets and differential    Snoring, sleep apnea  - Sleep study ordered      Add Vitamin D3 2000 U daily  Multiple vitamin daily        Return in about 6 months (around 2/2/2023).      Laury Trivedi CNP  [facility]

## 2022-08-03 DIAGNOSIS — I10 ESSENTIAL HYPERTENSION: ICD-10-CM

## 2022-08-04 RX ORDER — LOSARTAN POTASSIUM 50 MG/1
TABLET ORAL
Qty: 30 TABLET | Refills: 4 | Status: SHIPPED | OUTPATIENT
Start: 2022-08-04 | End: 2023-02-27

## 2022-09-29 ENCOUNTER — OFFICE VISIT (OUTPATIENT)
Dept: FAMILY MEDICINE | Facility: OTHER | Age: 57
End: 2022-09-29
Attending: NURSE PRACTITIONER
Payer: COMMERCIAL

## 2022-09-29 VITALS
OXYGEN SATURATION: 95 % | WEIGHT: 217 LBS | TEMPERATURE: 98 F | SYSTOLIC BLOOD PRESSURE: 128 MMHG | HEART RATE: 64 BPM | BODY MASS INDEX: 31.14 KG/M2 | DIASTOLIC BLOOD PRESSURE: 78 MMHG

## 2022-09-29 DIAGNOSIS — G89.29 CHRONIC JOINT PAIN: Primary | ICD-10-CM

## 2022-09-29 DIAGNOSIS — M25.50 CHRONIC JOINT PAIN: Primary | ICD-10-CM

## 2022-09-29 DIAGNOSIS — M72.2 PLANTAR FASCIITIS: ICD-10-CM

## 2022-09-29 DIAGNOSIS — M79.671 PAIN IN BOTH FEET: ICD-10-CM

## 2022-09-29 DIAGNOSIS — M79.672 PAIN IN BOTH FEET: ICD-10-CM

## 2022-09-29 LAB
CRP SERPL-MCNC: <2.9 MG/L (ref 0–8)
EST. AVERAGE GLUCOSE BLD GHB EST-MCNC: 114 MG/DL
HBA1C MFR BLD: 5.6 % (ref 0–5.6)
HOLD SPECIMEN: NORMAL

## 2022-09-29 PROCEDURE — 86038 ANTINUCLEAR ANTIBODIES: CPT | Performed by: NURSE PRACTITIONER

## 2022-09-29 PROCEDURE — 86140 C-REACTIVE PROTEIN: CPT | Performed by: NURSE PRACTITIONER

## 2022-09-29 PROCEDURE — 83036 HEMOGLOBIN GLYCOSYLATED A1C: CPT | Performed by: NURSE PRACTITIONER

## 2022-09-29 PROCEDURE — 36415 COLL VENOUS BLD VENIPUNCTURE: CPT | Performed by: NURSE PRACTITIONER

## 2022-09-29 PROCEDURE — 86431 RHEUMATOID FACTOR QUANT: CPT | Performed by: NURSE PRACTITIONER

## 2022-09-29 PROCEDURE — 99214 OFFICE O/P EST MOD 30 MIN: CPT | Performed by: NURSE PRACTITIONER

## 2022-09-29 RX ORDER — DICLOFENAC SODIUM 100 MG/1
100 TABLET, FILM COATED, EXTENDED RELEASE ORAL DAILY
Qty: 90 TABLET | Refills: 0 | Status: SHIPPED | OUTPATIENT
Start: 2022-09-29 | End: 2023-04-05

## 2022-09-29 ASSESSMENT — PAIN SCALES - GENERAL: PAINLEVEL: SEVERE PAIN (7)

## 2022-09-29 NOTE — PATIENT INSTRUCTIONS
I ordered a 3 month supply for copay purposes. Follow up with with your primary to determine duration.     ICE 2-3 times a day with frozen water bottle  Consider night brace sock (commercially available)  Stretching calf and heel against a wall  NSAID (ordered). Do not take additional  NSAIDS with Voltaren    If no improvement, or worsening - follow up with podiatry.       Chronic joint pain- lab today.

## 2022-09-29 NOTE — NURSING NOTE
"No chief complaint on file.      Initial /78 (BP Location: Left arm, Patient Position: Sitting, Cuff Size: Adult Large)   Pulse 64   Temp 98  F (36.7  C) (Tympanic)   Wt 98.4 kg (217 lb)   SpO2 95%   BMI 31.14 kg/m   Estimated body mass index is 31.14 kg/m  as calculated from the following:    Height as of 7/9/21: 1.778 m (5' 10\").    Weight as of this encounter: 98.4 kg (217 lb).  Medication Reconciliation: complete  Janell Alvarado LPN      "

## 2022-09-29 NOTE — PROGRESS NOTES
Assessment & Plan     Chronic joint pain  - Rheumatoid factor; Future  - Anti Nuclear Mini IgG by IFA with Reflex; Future  - CRP, inflammation; Future  - diclofenac (VOLTAREN XR) 100 MG 24 hr tablet; Take 1 tablet (100 mg) by mouth daily for 90 days   I ordered a 3 month supply for copay purposes. Follow up with with your primary to determine duration.   - Rheumatoid factor  - Anti Nuclear Mini IgG by IFA with Reflex  - CRP, inflammation    Plantar fasciitis  ICE 2-3 times a day with frozen water bottle  Consider night brace sock (commercially available)  Stretching calf and heel against a wall  NSAID (ordered). Do not take additional  NSAIDS with Voltaren    If no improvement, or worsening - follow up with podiatry.   - Orthopedic  Referral; Future  - Hemoglobin A1c; Future  - Hemoglobin A1c    Pain in both feet  Due to the pain in Jesús's feet having some cross over with neuropathic pain (burning sensation) I will check an A1C. Lipids done recently and normal as a result of statin therapy. Advised against alcohol use as well. See recommendations for working diagnosis of plantar fasciitis.   - Hemoglobin A1c; Future  - Hemoglobin A1c    Ordering of each unique test  Prescription drug management  No LOS data to display   Time spent doing chart review, history and exam, documentation and further activities per the note       No follow-ups on file.    Michelle Zuniga, CNP  [facility] [place] [facility]NI[place] CULP is a 57 year old, presenting for the following health issues:  No chief complaint on file.      HPI     Pain History:  When did you first notice your pain? - Acute Pain   Have you seen anyone else for your pain? No  Where in your body do you have pain? Feet   Ongoing for 3 months  Denies diabetes. Minimal, occasional ETOH use but not often.         Joint pain- Chronic  Taking 1000 mg ibuprofen in AM (OTC). Takes most morning.USes this for back back, shoulders, and knees. Chronic joint  pain in hands as well.       Dad had neuropathy. Concerned about this.   Negative: Diabetes, elevated cholesterol (does have elevated trigs)           Review of Systems   Constitutional, HEENT, cardiovascular, pulmonary, gi and gu systems are negative, except as otherwise noted.      Objective    /78 (BP Location: Left arm, Patient Position: Sitting, Cuff Size: Adult Large)   Pulse 64   Temp 98  F (36.7  C) (Tympanic)   Wt 98.4 kg (217 lb)   SpO2 95%   BMI 31.14 kg/m    Body mass index is 31.14 kg/m .     Physical Exam   GENERAL: healthy, alert and no distress  MS: Foot exam: Normal appearance without discoloration or deformity.Normal DP and PT pulses, no trophic changes or ulcerative lesions, normal sensory exam and normal monofilament exam. There is tenderness to the plantar fascia more so towards the MTP joints than the heel. No erythema, edema, or lesions.  All other aspects of extremity exam grossly normal.  NEURO: Normal strength and tone, mentation intact and speech normal  PSYCH: mentation appears normal, affect normal/bright    Results for orders placed or performed in visit on 09/29/22   Rheumatoid factor     Status: Normal   Result Value Ref Range    Rheumatoid Factor 11 <12 IU/mL   Anti Nuclear Mini IgG by IFA with Reflex     Status: Normal   Result Value Ref Range    FARHEEN interpretation Negative Negative   CRP, inflammation     Status: Normal   Result Value Ref Range    CRP Inflammation <2.9 0.0 - 8.0 mg/L   Extra Tube     Status: None    Narrative    The following orders were created for panel order Extra Tube.  Procedure                               Abnormality         Status                     ---------                               -----------         ------                     Extra Purple Top Tube[[id number]]                            Final result                 Please view results for these tests on the individual orders.   Hemoglobin A1c     Status: None   Result Value Ref Range     Estimated Average Glucose 114 mg/dL    Hemoglobin A1C 5.6 0.0 - 5.6 %   Extra Purple Top Tube     Status: None   Result Value Ref Range    Hold Specimen JIC

## 2022-10-03 LAB
ANA SER QL IF: NEGATIVE
RHEUMATOID FACT SER NEPH-ACNC: 11 IU/ML

## 2022-10-03 NOTE — RESULT ENCOUNTER NOTE
Normal RF and FARHEEN which are tests related to autoimmune conditions including rheumatoid arthritis.

## 2022-10-04 ENCOUNTER — DOCUMENTATION ONLY (OUTPATIENT)
Dept: SLEEP MEDICINE | Facility: HOSPITAL | Age: 57
End: 2022-10-04
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
## 2022-10-04 NOTE — PROGRESS NOTES
STOP BANG       Name: Asif Leon MRN# [id number]   Age: 57 year old YOB: 1965     Stop Bang questionnaire completed with a score of >3 to allow for HST     Have you been told you snore loudly (louder than talking or loud enough to be heard through doors)? YES    Do you often feel tired, fatigued, or sleepy during the daytime? YES    Has anyone observed you stop breathing during your sleep? YES    Do you have or are you being treated for high blood pressure? YES    Is your BMI greater than 35? NO    Is your neck size circumference 16 inches or greater? NO    Are you over 50 years old? YES    Stop Bang Score (# of yes): 6

## 2022-10-04 NOTE — PROGRESS NOTES
"Chart review prior to sleep testing.    Patient Summary:  57 year old yo male who is referred for concern for sleep-disordered breathing.    Patient Active Problem List    Diagnosis Date Noted     Hyperlipidemia LDL goal <100 2022     Priority: Medium     History of COVID-19 2021     Priority: Medium     Essential hypertension 2021     Priority: Medium     Impingement syndrome of left shoulder 2019     Priority: Medium     Observed sleep apnea 12/10/2018     Priority: Medium     Sacroiliac joint dysfunction 12/10/2018     Priority: Medium     History of colonic polyps 12/10/2018     Priority: Medium     Vitamin D deficiency 2018     Priority: Medium     Anxiety 01/15/2018     Priority: Medium     Chronic midline low back pain without sciatica 01/15/2018     Priority: Medium     Primary insomnia 01/15/2018     Priority: Medium       Current Outpatient Medications   Medication     ACETAMINOPHEN EXTRA STRENGTH 500 MG tablet     atorvastatin (LIPITOR) 20 MG tablet     diclofenac (VOLTAREN XR) 100 MG 24 hr tablet     losartan (COZAAR) 50 MG tablet     No current facility-administered medications for this visit.       Pertinent PMHx of HLD, HTN, anxiety, vitamin D deficiency, insomnia.    Prior Sleep Testin2003 - PSG with PDS.  Weight 210 lbs.  TST 5.9 hours, sleep efficiency 82%.  Mean SpO2 92%, roxanne 88%.  AHI 1.7.  No PLM's reported.    STOP-BANG score of 6, with unknown neck circumference.  [place] score of 11.  BMI of Estimated body mass index is 31.14 kg/m  as calculated from the following:    Height as of 21: 1.778 m (5' 10\").    Weight as of 22: 98.4 kg (217 lb).     Per questionnaire: \"Poor quality of sleep. Pretty certain I have sleep apnea. Almost always wake up with dry or congested sinuses \"    Sxs for 20 years.    Caffeine use:  Yes for 3+ per day.  Yes for within 6 hours of bed.    Tobacco use: No    Sleep pattern:  Workdays.  10pm - 4am, total sleep time " 4-5 hours.  Weekends.  10pm to 5-6am, total sleep time 5-6 hours.  Time to fall asleep: ~10-20 minutes.  Awakenings: 5-6 times per night, 5 minutes to return to sleep, awake for total of 1 hours per night.  Napping.  1-2 days per week, 10-15 minutes per nap.    Yes for RLS screen.  No for sleep walking.  No for dream enactment behavior.  No for bruxism.    Yes for morning headaches.  Yes for snoring.  Yes for observed apnea.  Yes for FHx of SE.    SHx:  , lives with wife.    A/P:    1.)  High likelihood of SE with STOP-BANG score of 6.  - Would appear to be candidate for either home sleep testing or in-lab PSG.  - Preference is in-lab PSG given that weight similar to prior PSG in 2003 where AHI was reported < 2.    ---  This note was written with the assistance of the Dragon voice-dictation technology software. The final document, although reviewed, may contain errors. For corrections, please contact the office.    Ricky Doherty MD    Sleep Medicine    [facility] [place], [place]  o Main Office: [phone]    [facility] [place], [place]  o [address], [address]  o Schedule visits: [phone]  o Main Office: [phone]  o Fax: [phone]

## 2022-10-06 ENCOUNTER — TELEPHONE (OUTPATIENT)
Dept: PULMONOLOGY | Facility: OTHER | Age: 57
End: 2022-10-06

## 2022-10-17 ENCOUNTER — HOSPITAL ENCOUNTER (EMERGENCY)
Facility: HOSPITAL | Age: 57
Discharge: HOME | End: 2022-10-17
Attending: NURSE PRACTITIONER | Admitting: NURSE PRACTITIONER
Payer: COMMERCIAL

## 2022-10-17 VITALS
TEMPERATURE: 98.7 F | HEART RATE: 80 BPM | DIASTOLIC BLOOD PRESSURE: 91 MMHG | OXYGEN SATURATION: 95 % | BODY MASS INDEX: 30.85 KG/M2 | SYSTOLIC BLOOD PRESSURE: 153 MMHG | RESPIRATION RATE: 18 BRPM | WEIGHT: 215 LBS

## 2022-10-17 DIAGNOSIS — L02.419 CELLULITIS AND ABSCESS OF UPPER EXTREMITY: Primary | ICD-10-CM

## 2022-10-17 DIAGNOSIS — L03.119 CELLULITIS AND ABSCESS OF UPPER EXTREMITY: Primary | ICD-10-CM

## 2022-10-17 PROCEDURE — 10060 I&D ABSCESS SIMPLE/SINGLE: CPT

## 2022-10-17 PROCEDURE — 999N000104 HC STATISTIC NO CHARGE

## 2022-10-17 PROCEDURE — 10060 I&D ABSCESS SIMPLE/SINGLE: CPT | Performed by: NURSE PRACTITIONER

## 2022-10-17 PROCEDURE — 87070 CULTURE OTHR SPECIMN AEROBIC: CPT | Performed by: NURSE PRACTITIONER

## 2022-10-17 PROCEDURE — 87205 SMEAR GRAM STAIN: CPT | Performed by: NURSE PRACTITIONER

## 2022-10-17 RX ORDER — SULFAMETHOXAZOLE/TRIMETHOPRIM 800-160 MG
1 TABLET ORAL 2 TIMES DAILY
Qty: 14 TABLET | Refills: 0 | Status: SHIPPED | OUTPATIENT
Start: 2022-10-17 | End: 2022-10-24

## 2022-10-17 ASSESSMENT — ENCOUNTER SYMPTOMS
COLOR CHANGE: 1
MYALGIAS: 1
WOUND: 1
FEVER: 0
APPETITE CHANGE: 0

## 2022-10-17 ASSESSMENT — ACTIVITIES OF DAILY LIVING (ADL): ADLS_ACUITY_SCORE: 35

## 2022-10-17 NOTE — ED TRIAGE NOTES
Pt presents with cyst on top of left shoulder onset 2 weeks ago. Denies drainage. Denies fever.

## 2022-10-18 NOTE — DISCHARGE INSTRUCTIONS
Take the antibiotic as prescribed until finished.    Keep the packing in place for 2 days.  After that you can do daily dressing changes, removing the packing each time.  Once you do not notice any drainage you can stop packing and let the wound heal on its own.    Return to emergency department for any worsening or concerning symptoms.

## 2022-10-18 NOTE — ED PROVIDER NOTES
History     Chief Complaint   Patient presents with     Cyst     HPI  Asif Leon is a 57 year old male who presents to urgent care for evaluation of a lump to his left shoulder.  Patient tells me that it started off as a small pimple that his gradually increased in size.  It has also become more painful and interfering with his daily activities.  No fevers or chills at home.  He has not noticed any drainage.  Patient notes that he has had a couple of them to his upper back in the past that have had to be drained.  No known history of MRSA.    Allergies:  Allergies   Allergen Reactions     No Clinical Screening - See Comments      Seasonal allergies       Problem List:    Patient Active Problem List    Diagnosis Date Noted     Hyperlipidemia LDL goal <100 02/02/2022     Priority: Medium     History of COVID-19 07/09/2021     Priority: Medium     Essential hypertension 07/09/2021     Priority: Medium     Impingement syndrome of left shoulder 01/31/2019     Priority: Medium     Observed sleep apnea 12/10/2018     Priority: Medium     Sacroiliac joint dysfunction 12/10/2018     Priority: Medium     History of colonic polyps 12/10/2018     Priority: Medium     Vitamin D deficiency 04/25/2018     Priority: Medium     Anxiety 01/15/2018     Priority: Medium     Chronic midline low back pain without sciatica 01/15/2018     Priority: Medium     Primary insomnia 01/15/2018     Priority: Medium        Past Medical History:    Past Medical History:   Diagnosis Date     Depressive disorder      History of COVID-19 7/9/2021     Hyperlipidemia LDL goal <100 2/2/2022     TGA (transient global amnesia) 01/04/2018       Past Surgical History:    Past Surgical History:   Procedure Laterality Date     AS COLONOSCOPY W BX FORCEP/CAUTERY REMOVAL KENYA/POLYP/LESION N/A 01/01/1999    stomache pain/family hx     COLONOSCOPY  01/01/2011    f/u polyectomy/ family HX crohns     COLONOSCOPY N/A 12/20/2018    Procedure: COLONOSCOPY with  Polypectomy;  Surgeon: Yaerd Vilchis MD;  Location: [facility]     ORTHOPEDIC SURGERY      shoulder surgery     RECESSION RESECTION (REPAIR STRABISMUS) BILATERAL  10/18/2011    Procedure:RECESSION RESECTION (REPAIR STRABISMUS) BILATERAL; Strabismus Repair Right; Surgeon:MERCEDEZ CHAUHAN; Location: OR       Family History:    Family History   Problem Relation Age of Onset     Cancer Mother         Brain - Cause of Death     Crohn's Disease Father         Cause of Death     Crohn's Disease Sister        Social History:  Marital Status:   [2]  Social History     Tobacco Use     Smoking status: Never     Smokeless tobacco: Never   Substance Use Topics     Alcohol use: Yes     Comment: occasional     Drug use: No        Medications:    sulfamethoxazole-trimethoprim (BACTRIM DS) 800-160 MG tablet  ACETAMINOPHEN EXTRA STRENGTH 500 MG tablet  atorvastatin (LIPITOR) 20 MG tablet  diclofenac (VOLTAREN XR) 100 MG 24 hr tablet  losartan (COZAAR) 50 MG tablet          Review of Systems   Constitutional: Negative for appetite change and fever.   Musculoskeletal: Positive for myalgias.   Skin: Positive for color change and wound.   All other systems reviewed and are negative.      Physical Exam   BP: 153/91  Pulse: 80  Temp: 98.7  F (37.1  C)  Resp: 18  Weight: 97.5 kg (215 lb) (stated)  SpO2: 95 %      Physical Exam  Vitals and nursing note reviewed.   Constitutional:       Appearance: Normal appearance. He is not ill-appearing or toxic-appearing.   HENT:      Head: Atraumatic.      Left Ear: Ear canal normal.   Eyes:      Pupils: Pupils are equal, round, and reactive to light.   Cardiovascular:      Rate and Rhythm: Normal rate.   Pulmonary:      Effort: Pulmonary effort is normal.   Musculoskeletal:         General: Normal range of motion.      Cervical back: Neck supple.   Skin:     General: Skin is warm and dry.      Capillary Refill: Capillary refill takes less than 2 seconds.      Findings: Erythema present.            "  Comments: Approximately 1 inch lump to left shoulder with surrounding erythema of upto 2.2 inches. Area is tender to palpation with fluctuance appreciated.    Neurological:      Mental Status: He is alert and oriented to person, place, and time.         ED Course                 [facility]    PROCEDURE: -Incision/Drainage    Date/Time: 10/17/2022 7:47 PM  Performed by: Thania Conner CNP  Authorized by: Thania Conner CNP     Risks, benefits and alternatives discussed.      UNIVERSAL PROTOCOL   Site Marked: NA  Prior Images Obtained and Reviewed:  NA  Required items: Required blood products, implants, devices and special equipment available    Patient identity confirmed:  Verbally with patient and arm band  NA - No sedation, light sedation, or local anesthesia  Confirmation Checklist:  Patient's identity using two indicators, relevant allergies, procedure was appropriate and matched the consent or emergent situation and correct equipment/implants were available  Time out: Immediately prior to the procedure a time out was called    Universal Protocol: the Joint Commission Universal Protocol was followed    Preparation: Patient was prepped and draped in usual sterile fashion      LOCATION:      Type:  Abscess    Size:  1  x 1 inch    Location:  Upper extremity    Upper extremity location:  Shoulder    Shoulder location:  L shoulder    PRE-PROCEDURE DETAILS:     Skin preparation:  Chloraprep    PROCEDURE TYPE:     Complexity:  Simple    ANESTHESIA (see MAR for exact dosages):     Anesthesia method:  Local infiltration    Local anesthetic:  Lidocaine 1% WITH epi    PROCEDURE DETAILS:     Incision types:  Single straight    Incision depth:  Subcutaneous    Scalpel blade:  11    Wound management:  Probed and deloculated and irrigated with saline    Drainage:  Purulent    Drainage amount:  Moderate    Wound treatment:  Wound left open    Packing materials:  1/2 in iodoform gauze    Amount 1/2\" iodoform:  " 1.5cm    PROCEDURE    Patient Tolerance:  Patient tolerated the procedure well with no immediate complications                No results found for this or any previous visit (from the past 24 hour(s)).    Medications - No data to display    Assessments & Plan (with Medical Decision Making)     I have reviewed the nursing notes.    57-year-old male that presented for evaluation of an abscess to his left shoulder.  I&D done (see patient note above).  Patient tolerated well.  Wound culture obtained.  Packing was placed today.  Patient was advised to keep packing in place for 2 days.  After that may change the packing and dressing daily.  Tylenol or ibuprofen as needed for pain.  Bactrim as prescribed.  Return to ED/UC for any worsening or concerning symptoms.    I have reviewed the findings, diagnosis, plan and need for follow up with the patient.  This document was prepared using a combination of typing and voice generated software.  While every attempt was made for accuracy, spelling and grammatical errors may exist.    Discharge Medication List as of 10/17/2022  7:33 PM      START taking these medications    Details   sulfamethoxazole-trimethoprim (BACTRIM DS) 800-160 MG tablet Take 1 tablet by mouth 2 times daily for 7 days, Disp-14 tablet, R-0, [facility]             Final diagnoses:   Cellulitis and abscess of upper extremity       10/17/2022   [facility]     Mpofu, Prudence, CNP  10/17/22 1952

## 2022-10-19 ENCOUNTER — OFFICE VISIT (OUTPATIENT)
Dept: PODIATRY | Facility: OTHER | Age: 57
End: 2022-10-19
Attending: NURSE PRACTITIONER
Payer: COMMERCIAL

## 2022-10-19 VITALS
SYSTOLIC BLOOD PRESSURE: 111 MMHG | HEART RATE: 76 BPM | OXYGEN SATURATION: 97 % | TEMPERATURE: 98.1 F | DIASTOLIC BLOOD PRESSURE: 65 MMHG

## 2022-10-19 DIAGNOSIS — M77.42 METATARSALGIA OF LEFT FOOT: ICD-10-CM

## 2022-10-19 DIAGNOSIS — G57.61 LESION OF RIGHT PLANTAR NERVE: ICD-10-CM

## 2022-10-19 DIAGNOSIS — M77.41 METATARSALGIA OF RIGHT FOOT: ICD-10-CM

## 2022-10-19 DIAGNOSIS — G57.62 LESION OF LEFT PLANTAR NERVE: ICD-10-CM

## 2022-10-19 DIAGNOSIS — M60.862 OTHER MYOSITIS OF LEFT LOWER EXTREMITY: ICD-10-CM

## 2022-10-19 DIAGNOSIS — M60.861 OTHER MYOSITIS OF RIGHT LOWER EXTREMITY: ICD-10-CM

## 2022-10-19 DIAGNOSIS — M62.462 GASTROCNEMIUS EQUINUS OF LEFT LOWER EXTREMITY: ICD-10-CM

## 2022-10-19 DIAGNOSIS — M72.2 PLANTAR FASCIITIS: Primary | ICD-10-CM

## 2022-10-19 DIAGNOSIS — M62.461 GASTROCNEMIUS EQUINUS OF RIGHT LOWER EXTREMITY: ICD-10-CM

## 2022-10-19 PROCEDURE — G0463 HOSPITAL OUTPT CLINIC VISIT: HCPCS

## 2022-10-19 PROCEDURE — 99213 OFFICE O/P EST LOW 20 MIN: CPT | Performed by: PODIATRIST

## 2022-10-19 ASSESSMENT — PAIN SCALES - GENERAL: PAINLEVEL: MILD PAIN (2)

## 2022-10-19 NOTE — PATIENT INSTRUCTIONS
-Stability Shoe Gear: This involves wearing a solid tennis shoe that bends at the toe, but has a solid midfoot portion of the shoe that does not bend or twist in half, and a rigid heel contour.   -----Hathaway, Asics, and New Balance are a few brands that have several types of stability tennis shoes. However, these brands also carry lightweight shoes that do not meet the above criteria, so look for a stability tennis shoe.  -----Hathaway tend to have a wider toe box if you have difficulty finding wide enough shoes for your feet.   -----Any brand of can be worn as long as it meets the above three criteria.  -Compression socks: Advised to wear compression socks all day (especially when working) to decrease LOWER EXTREMITY swelling in both feet and legs. Apply the socks first thing in the morning before getting out of bed and remove them at night when the feet are elevated in bed. Consider looking for 15-20mmHg.  -Stretching: Stretch the calf muscles to increase flexibility of the calf muscles. If possible, aim to stretch the calf muscles for a combined total of one hour per day.  -Icing: Ice the painful area of the foot minimally once a day for ten minutes per foot (can be a frozen water bottle if pain is on the bottom surface of the foot). Ice after any extended amount of time on your feet.  -Consider supportive sandals for around the house (such as [place], Vionics, Birkenstocks, Keens, Merrells, or Oofos sandals)    -Custom inserts will be ordered today -- you will be called to make an appointment with the orthotist, Yessenia Duque, through [facility].

## 2022-10-19 NOTE — PROGRESS NOTES
Chief complaint: Patient presents with:  Musculoskeletal Problem: Plantar fasciitis        History of Present Illness: This 57 year old male is seen at the request of Michelle Zuniga NP, for evaluation and suggestions of management of bilateral foot pain (LEFT more than RIGHT).    The LEFT foot started to hurt around June, 2022, then it progressed to the RIGHT. The pain initially felt like he stepped on a pencil with a sharp pain in his arch. It also sometimes felt like a sock bunched up in the ball of the foot and the central arch. The pain is now also on the RIGHT foot. Pain is usually the worst when he gest up in the morning. He has an hour drive to work in [employer], and getting out of the vehicle also causes a lot of pain. As the day goes on, it takes less rest pain before the pain worsens when he stands.    He was not noticing pain when resting, but the last few weeks, he has noticed more of this pain at night.    He had a LEFT knee surgery followed by a full revision in August, 2021 and he questions if this contributed to the foot pain.    His father had neuropathy and he is not sure if he is also developing this. He sometimes gets a burning sensation in his feet. Historically, he has DJD of the back. He was told by his surgeon to go as long as he could before having back surgery. He then went to [facility] [place] and he advised and he was advised not to have surgery. He does notice a correlation between his back pain and foot pain.    He was recently seen by Michelle Zuniga NP, who ordered a HbA1C, FARHEEN and RF which were all within normal limits. He was given some stretches of the calves and he has been doing this, but he has not noticed a difference. He has not yet looked into a night splint. He has iced with a frozen water bottle, but it only helps temporarily.    Additionally, he has had a lot of cramping in his legs and pain along his legs. He is wondering if this is neuropathy and how he can treat  this as well.    No further pedal complaints today.         /65 (BP Location: Left arm, Patient Position: Sitting, Cuff Size: Adult Regular)   Pulse 76   Temp 98.1  F (36.7  C) (Tympanic)   SpO2 97%     Patient Active Problem List   Diagnosis     Anxiety     Chronic midline low back pain without sciatica     Primary insomnia     Vitamin D deficiency     Observed sleep apnea     Sacroiliac joint dysfunction     History of colonic polyps     Impingement syndrome of left shoulder     History of COVID-19     Essential hypertension     Hyperlipidemia LDL goal <100       Past Surgical History:   Procedure Laterality Date     AS COLONOSCOPY W BX FORCEP/CAUTERY REMOVAL KENYA/POLYP/LESION N/A 01/01/1999    stomache pain/family hx     COLONOSCOPY  01/01/2011    f/u polyectomy/ family HX crohns     COLONOSCOPY N/A 12/20/2018    Procedure: COLONOSCOPY with Polypectomy;  Surgeon: Yared Vilchis MD;  Location: [facility]     ORTHOPEDIC SURGERY      shoulder surgery     RECESSION RESECTION (REPAIR STRABISMUS) BILATERAL  10/18/2011    Procedure:RECESSION RESECTION (REPAIR STRABISMUS) BILATERAL; Strabismus Repair Right; Surgeon:MERCEDEZ CHAUHAN; Location: OR       Current Outpatient Medications   Medication     ACETAMINOPHEN EXTRA STRENGTH 500 MG tablet     atorvastatin (LIPITOR) 20 MG tablet     losartan (COZAAR) 50 MG tablet     sulfamethoxazole-trimethoprim (BACTRIM DS) 800-160 MG tablet     diclofenac (VOLTAREN XR) 100 MG 24 hr tablet     No current facility-administered medications for this visit.          Allergies   Allergen Reactions     No Clinical Screening - See Comments      Seasonal allergies       Family History   Problem Relation Age of Onset     Cancer Mother         Brain - Cause of Death     Crohn's Disease Father         Cause of Death     Crohn's Disease Sister        Social History     Socioeconomic History     Marital status:    Occupational History     Occupation: MAINTENANCE     Employer:  [employer]     Occupation: machin[employer]   Tobacco Use     Smoking status: Never     Smokeless tobacco: Never   Substance and Sexual Activity     Alcohol use: Yes     Comment: occasional     Drug use: No   Other Topics Concern     Blood Transfusions Yes     Comment: PERMITS     Exercise Yes     Comment: WALKING - DAILY       ROS: 10 point ROS neg other than the symptoms noted above in the HPI.  EXAM  Constitutional: healthy, alert and no distress    Psychiatric: mentation appears normal and affect normal/bright    VASCULAR:  -Dorsalis pedis pulse +2/4 b/l  -Posterior tibial pulse +2/4 b/l  -Capillary refill time < 3 seconds to b/l hallux  -Hair growth Present to b/l anterior legs and ankles  NEURO:  -Light touch sensation intact to b/l plantar forefoot  DERM:  -Skin temperature, texture and turgor WNL b/l  -Toenails elongated, thickened, dystrophic and discolored x 10  MSK:  -Pain on palpation to the medial tubercle of the calcaneus and along the medial plantar fascia band to the midfoot bilaterally   -Moderate decrease in arch height while patient is NWB, bilaterally   -Pain on palpation to the intermetatarsal interspaces  -Pain on palpation to multiple areas along the bilateral medial, lateral and posterior leg starting at the level of the ankle and extending to just distal to the knee.  ---Positive jump sign to all areas of palpation  ---Pain is noted to multiple locations on the sartorius (just distal to the knee along the insertion of the sartorius), gastrocnemius, soleus, and popliteus.  -Pain on palpation to the plantar metatarsal heads 2-4 bilaterally     -Muscle strength of ankles +5/5 for dorsiflexion, plantarflexion, ABDUction and ADDuction b/l    -Ankle joint passive ROM within normal limits except for dorsiflexion:    Dorsiflexion, RIGHT Straight knee 0 degrees    Dorsiflexion, LEFT Straight knee 0 degrees    ============================================================    ASSESSMENT:  (M72.2)  Plantar fasciitis  (primary encounter diagnosis)    (G57.61) Lesion of right plantar nerve    (G57.62) Lesion of left plantar nerve    (M77.41) Metatarsalgia of right foot    (M77.42) Metatarsalgia of left foot    (M21.861) Gastrocnemius equinus of right lower extremity    (M21.862) Gastrocnemius equinus of left lower extremity    (M60.862) Other myositis of left lower extremity    (M60.861) Other myositis of right lower extremity      PLAN:  -Patient evaluated and examined. Treatment options discussed with no educational barriers noted.    -Plantar Fascia / metatarsal head / lesion of plantar nerve pain:  -Discussed plantar fascia pain including potential etiologies and treatment options. Patient's pain was likely started due to a combination of factors that can include but are not limited to worn or improper shoe gear, sudden change in shoe gear, change in activity, imbalanced biomechanics (such as the patient's bilateral equinus deformity of the calf muscles). Patient also has a bilateral pes planus which additionally adds to increased plantar forefoot pressure. The tight calf muscles cause an extensor pull at the lesser digits which pushes more pressure on the metatarsal heads. As the metatarsal heads are pushed into the ground, they push closer together and pinch the intermetatarsal nerve that runs between the metatarsal heads.    ---Discussed conservative treatment options including compression socks, icing, elevating, resting, injections, PT, change in shoe gear (including proper shoe gear around the house), night splints. At this time, patient would like to proceed with the below treatment options:    -Stability Shoe Gear: This involves wearing a solid tennis shoe that bends at the toe, but has a solid midfoot portion of the shoe that does not bend or twist in half, and a rigid heel contour.   -----Hathaway, Webvantas, and New Balance are a few brands that have several types of stability tennis shoes. However,  these brands also carry lightweight shoes that do not meet the above criteria, so look for a stability tennis shoe.  -----Hathaway tend to have a wider toe box if you have difficulty finding wide enough shoes for your feet.   -----Any brand of can be worn as long as it meets the above three criteria.  -Compression socks: Advised to wear compression socks all day (especially when working) to decrease LOWER EXTREMITY swelling in both feet and legs. Apply the socks first thing in the morning before getting out of bed and remove them at night when the feet are elevated in bed. Consider looking for 15-20mmHg.  -Stretching: Stretch the calf muscles to increase flexibility of the calf muscles. If possible, aim to stretch the calf muscles for a combined total of one hour per day.  -Icing: Ice the painful area of the foot minimally once a day for ten minutes per foot (can be a frozen water bottle if pain is on the bottom surface of the foot). Ice after any extended amount of time on your feet.  -Consider supportive sandals for around the house (such as [place], Vionics, Birkenstocks, Keens, Merrells, or Oofos sandals)    -Custom inserts will be ordered today -- you will be called to make an appointment with the orthotist, Yessenia Duque, through [facility].     Trigger Points:   -Discussed trigger point pain in the legs and how these can sometimes refer pain to the heel and can send pain up and down the leg. It is not guaranteed that the trigger point pain will fully resolve, but the pain may become more controlled with proper treatment. Physical therapy can work on dry needling if the trigger points worsen and deep tissue massage.  ---At this time, patient is advised to start massaging the trigger points on his own at home to work on releasing the trigger points.     ---------------------    -Will wait on physical therapy and a plantar fascia injection today. Patient is advised to start with the above treatment regimen. PT and  orthotics will be considered if pain does not improve.    -Patient in agreement with the above treatment plan and all of patient's questions were answered.      Return to clinic three months to evaluate bilateral foot pain        Margot Benavides DPM, NAOMI

## 2022-10-19 NOTE — NURSING NOTE
"Chief Complaint   Patient presents with     Musculoskeletal Problem     Plantar fasciitis       Initial /65 (BP Location: Left arm, Patient Position: Sitting, Cuff Size: Adult Regular)   Pulse 76   Temp 98.1  F (36.7  C) (Tympanic)   SpO2 97%  Estimated body mass index is 30.85 kg/m  as calculated from the following:    Height as of 7/9/21: 1.778 m (5' 10\").    Weight as of 10/17/22: 97.5 kg (215 lb).  Medication Reconciliation: cindy Enrique  "

## 2022-10-19 NOTE — LETTER
10/19/2022         RE: Asif Leon  [address] [facility] [id number]        Dear Colleague,    Thank you for referring your patient, Asif Leon, to the [facility]. Please see a copy of my visit note below.    Chief complaint: Patient presents with:  Musculoskeletal Problem: Plantar fasciitis        History of Present Illness: This 57 year old male is seen at the request of Michelle Zuniga NP, for evaluation and suggestions of management of bilateral foot pain (LEFT more than RIGHT).    The LEFT foot started to hurt around June, 2022, then it progressed to the RIGHT. The pain initially felt like he stepped on a pencil with a sharp pain in his arch. It also sometimes felt like a sock bunched up in the ball of the foot and the central arch. The pain is now also on the RIGHT foot. Pain is usually the worst when he gest up in the morning. He has an hour drive to work in [employer], and getting out of the vehicle also causes a lot of pain. As the day goes on, it takes less rest pain before the pain worsens when he stands.    He was not noticing pain when resting, but the last few weeks, he has noticed more of this pain at night.    He had a LEFT knee surgery followed by a full revision in August, 2021 and he questions if this contributed to the foot pain.    His father had neuropathy and he is not sure if he is also developing this. He sometimes gets a burning sensation in his feet. Historically, he has DJD of the back. He was told by his surgeon to go as long as he could before having back surgery. He then went to [facility] and he advised and he was advised not to have surgery. He does notice a correlation between his back pain and foot pain.    He was recently seen by Michelle Zungia NP, who ordered a HbA1C, FARHEEN and RF which were all within normal limits. He was given some stretches of the calves and he has been doing this, but he has not noticed a difference. He  has not yet looked into a night splint. He has iced with a frozen water bottle, but it only helps temporarily.    Additionally, he has had a lot of cramping in his legs and pain along his legs. He is wondering if this is neuropathy and how he can treat this as well.    No further pedal complaints today.         /65 (BP Location: Left arm, Patient Position: Sitting, Cuff Size: Adult Regular)   Pulse 76   Temp 98.1  F (36.7  C) (Tympanic)   SpO2 97%     Patient Active Problem List   Diagnosis     Anxiety     Chronic midline low back pain without sciatica     Primary insomnia     Vitamin D deficiency     Observed sleep apnea     Sacroiliac joint dysfunction     History of colonic polyps     Impingement syndrome of left shoulder     History of COVID-19     Essential hypertension     Hyperlipidemia LDL goal <100       Past Surgical History:   Procedure Laterality Date     AS COLONOSCOPY W BX FORCEP/CAUTERY REMOVAL KENYA/POLYP/LESION N/A 01/01/1999    stomache pain/family hx     COLONOSCOPY  01/01/2011    f/u polyectomy/ family HX crohns     COLONOSCOPY N/A 12/20/2018    Procedure: COLONOSCOPY with Polypectomy;  Surgeon: Yared Vilchis MD;  Location: [facility]     ORTHOPEDIC SURGERY      shoulder surgery     RECESSION RESECTION (REPAIR STRABISMUS) BILATERAL  10/18/2011    Procedure:RECESSION RESECTION (REPAIR STRABISMUS) BILATERAL; Strabismus Repair Right; Surgeon:MERCEDEZ CHAUHAN; Location: OR       Current Outpatient Medications   Medication     ACETAMINOPHEN EXTRA STRENGTH 500 MG tablet     atorvastatin (LIPITOR) 20 MG tablet     losartan (COZAAR) 50 MG tablet     sulfamethoxazole-trimethoprim (BACTRIM DS) 800-160 MG tablet     diclofenac (VOLTAREN XR) 100 MG 24 hr tablet     No current facility-administered medications for this visit.          Allergies   Allergen Reactions     No Clinical Screening - See Comments      Seasonal allergies       Family History   Problem Relation Age of Onset     Cancer Mother          Brain - Cause of Death     Crohn's Disease Father         Cause of Death     Crohn's Disease Sister        Social History     Socioeconomic History     Marital status:    Occupational History     Occupation: MAINTENANCE     Employer: [employer]     Occupation: [employer]   Tobacco Use     Smoking status: Never     Smokeless tobacco: Never   Substance and Sexual Activity     Alcohol use: Yes     Comment: occasional     Drug use: No   Other Topics Concern     Blood Transfusions Yes     Comment: PERMITS     Exercise Yes     Comment: WALKING - DAILY       ROS: 10 point ROS neg other than the symptoms noted above in the HPI.  EXAM  Constitutional: healthy, alert and no distress    Psychiatric: mentation appears normal and affect normal/bright    VASCULAR:  -Dorsalis pedis pulse +2/4 b/l  -Posterior tibial pulse +2/4 b/l  -Capillary refill time < 3 seconds to b/l hallux  -Hair growth Present to b/l anterior legs and ankles  NEURO:  -Light touch sensation intact to b/l plantar forefoot  DERM:  -Skin temperature, texture and turgor WNL b/l  -Toenails elongated, thickened, dystrophic and discolored x 10  MSK:  -Pain on palpation to the medial tubercle of the calcaneus and along the medial plantar fascia band to the midfoot bilaterally   -Moderate decrease in arch height while patient is NWB, bilaterally   -Pain on palpation to the intermetatarsal interspaces  -Pain on palpation to multiple areas along the bilateral medial, lateral and posterior leg starting at the level of the ankle and extending to just distal to the knee.  ---Positive jump sign to all areas of palpation  ---Pain is noted to multiple locations on the sartorius (just distal to the knee along the insertion of the sartorius), gastrocnemius, soleus, and popliteus.  -Pain on palpation to the plantar metatarsal heads 2-4 bilaterally     -Muscle strength of ankles +5/5 for dorsiflexion, plantarflexion, ABDUction and ADDuction b/l    -Ankle  joint passive ROM within normal limits except for dorsiflexion:    Dorsiflexion, RIGHT Straight knee 0 degrees    Dorsiflexion, LEFT Straight knee 0 degrees    ============================================================    ASSESSMENT:  (M72.2) Plantar fasciitis  (primary encounter diagnosis)    (G57.61) Lesion of right plantar nerve    (G57.62) Lesion of left plantar nerve    (M77.41) Metatarsalgia of right foot    (M77.42) Metatarsalgia of left foot    (M21.861) Gastrocnemius equinus of right lower extremity    (M21.862) Gastrocnemius equinus of left lower extremity    (M60.862) Other myositis of left lower extremity    (M60.861) Other myositis of right lower extremity      PLAN:  -Patient evaluated and examined. Treatment options discussed with no educational barriers noted.    -Plantar Fascia / metatarsal head / lesion of plantar nerve pain:  -Discussed plantar fascia pain including potential etiologies and treatment options. Patient's pain was likely started due to a combination of factors that can include but are not limited to worn or improper shoe gear, sudden change in shoe gear, change in activity, imbalanced biomechanics (such as the patient's bilateral equinus deformity of the calf muscles). Patient also has a bilateral pes planus which additionally adds to increased plantar forefoot pressure. The tight calf muscles cause an extensor pull at the lesser digits which pushes more pressure on the metatarsal heads. As the metatarsal heads are pushed into the ground, they push closer together and pinch the intermetatarsal nerve that runs between the metatarsal heads.    ---Discussed conservative treatment options including compression socks, icing, elevating, resting, injections, PT, change in shoe gear (including proper shoe gear around the house), night splints. At this time, patient would like to proceed with the below treatment options:    -Stability Shoe Gear: This involves wearing a solid tennis shoe  that bends at the toe, but has a solid midfoot portion of the shoe that does not bend or twist in half, and a rigid heel contour.   -----Hathaway, Asics, and New Balance are a few brands that have several types of stability tennis shoes. However, these brands also carry lightweight shoes that do not meet the above criteria, so look for a stability tennis shoe.  -----Hathaway tend to have a wider toe box if you have difficulty finding wide enough shoes for your feet.   -----Any brand of can be worn as long as it meets the above three criteria.  -Compression socks: Advised to wear compression socks all day (especially when working) to decrease LOWER EXTREMITY swelling in both feet and legs. Apply the socks first thing in the morning before getting out of bed and remove them at night when the feet are elevated in bed. Consider looking for 15-20mmHg.  -Stretching: Stretch the calf muscles to increase flexibility of the calf muscles. If possible, aim to stretch the calf muscles for a combined total of one hour per day.  -Icing: Ice the painful area of the foot minimally once a day for ten minutes per foot (can be a frozen water bottle if pain is on the bottom surface of the foot). Ice after any extended amount of time on your feet.  -Consider supportive sandals for around the house (such as [place], Vionics, Birkenstocks, Keens, Merrells, or Oofos sandals)    -Custom inserts will be ordered today -- you will be called to make an appointment with the orthotist, Yessenia Duque, through [facility].     Trigger Points:   -Discussed trigger point pain in the legs and how these can sometimes refer pain to the heel and can send pain up and down the leg. It is not guaranteed that the trigger point pain will fully resolve, but the pain may become more controlled with proper treatment. Physical therapy can work on dry needling if the trigger points worsen and deep tissue massage.  ---At this time, patient is advised to start massaging the  trigger points on his own at home to work on releasing the trigger points.     ---------------------    -Will wait on physical therapy and a plantar fascia injection today. Patient is advised to start with the above treatment regimen. PT and orthotics will be considered if pain does not improve.    -Patient in agreement with the above treatment plan and all of patient's questions were answered.      Return to clinic three months to evaluate bilateral foot pain        Margot Benavides DPM, NAOMI      Again, thank you for allowing me to participate in the care of your patient.        Sincerely,        Margot Benavides DPM

## 2022-11-19 ENCOUNTER — HEALTH MAINTENANCE LETTER (OUTPATIENT)
Age: 57
End: 2022-11-19

## 2022-11-29 ENCOUNTER — TRANSFERRED RECORDS (OUTPATIENT)
Dept: HEALTH INFORMATION MANAGEMENT | Facility: CLINIC | Age: 57
End: 2022-11-29

## 2023-01-10 ENCOUNTER — TRANSFERRED RECORDS (OUTPATIENT)
Dept: HEALTH INFORMATION MANAGEMENT | Facility: CLINIC | Age: 58
End: 2023-01-10

## 2023-01-16 DIAGNOSIS — G47.33 OSA (OBSTRUCTIVE SLEEP APNEA): Primary | ICD-10-CM

## 2023-01-18 ENCOUNTER — OFFICE VISIT (OUTPATIENT)
Dept: PODIATRY | Facility: OTHER | Age: 58
End: 2023-01-18
Attending: NURSE PRACTITIONER
Payer: COMMERCIAL

## 2023-01-18 VITALS
DIASTOLIC BLOOD PRESSURE: 74 MMHG | SYSTOLIC BLOOD PRESSURE: 128 MMHG | HEIGHT: 70 IN | TEMPERATURE: 97.9 F | WEIGHT: 210 LBS | OXYGEN SATURATION: 96 % | HEART RATE: 65 BPM | BODY MASS INDEX: 30.06 KG/M2

## 2023-01-18 DIAGNOSIS — M60.861 OTHER MYOSITIS OF RIGHT LOWER EXTREMITY: ICD-10-CM

## 2023-01-18 DIAGNOSIS — G57.61 LESION OF RIGHT PLANTAR NERVE: ICD-10-CM

## 2023-01-18 DIAGNOSIS — M62.461 GASTROCNEMIUS EQUINUS OF RIGHT LOWER EXTREMITY: ICD-10-CM

## 2023-01-18 DIAGNOSIS — M77.41 METATARSALGIA OF RIGHT FOOT: ICD-10-CM

## 2023-01-18 DIAGNOSIS — M72.2 PLANTAR FASCIITIS: Primary | ICD-10-CM

## 2023-01-18 DIAGNOSIS — M77.42 METATARSALGIA OF LEFT FOOT: ICD-10-CM

## 2023-01-18 DIAGNOSIS — M62.462 GASTROCNEMIUS EQUINUS OF LEFT LOWER EXTREMITY: ICD-10-CM

## 2023-01-18 DIAGNOSIS — G57.62 LESION OF LEFT PLANTAR NERVE: ICD-10-CM

## 2023-01-18 DIAGNOSIS — M60.862 OTHER MYOSITIS OF LEFT LOWER EXTREMITY: ICD-10-CM

## 2023-01-18 PROCEDURE — 99213 OFFICE O/P EST LOW 20 MIN: CPT | Performed by: PODIATRIST

## 2023-01-18 ASSESSMENT — PAIN SCALES - GENERAL: PAINLEVEL: MILD PAIN (3)

## 2023-01-18 NOTE — PROGRESS NOTES
"Chief complaint: Patient presents with:  RECHECK: 3 month follow up-bilateral feet- has orthotics but still has pain      History of Present Illness: This 57 year old male is seen for follow-up management of bilateral foot pain (LEFT more than RIGHT).    He received orthotics around early January, 2023. The orthotics do not fit in his work boots so he has been unable to wear them at work. He has tried to wear them outside of work. The orthotics put some pressure on the foot and make it feel a little better. He is feet are still very sore by the end of the day.     He has been trying to do some calf stretches and they are always tight. He hasn't tried compression socks yet. He has a massager, compression machine that he wears at night. This feels really good on his legs and he uses them for an hour at a time.    He still sometimes gets cramping in his legs based on the types of activities he is doing throughout the day. The LEFT is a little worse than the RIGHT, but pain is fairly equal.     No further pedal complaints today.         /74 (BP Location: Left arm, Patient Position: Chair, Cuff Size: Adult Large)   Pulse 65   Temp 97.9  F (36.6  C) (Tympanic)   Ht 1.778 m (5' 10\")   Wt 95.3 kg (210 lb)   SpO2 96%   BMI 30.13 kg/m      Patient Active Problem List   Diagnosis     Anxiety     Chronic midline low back pain without sciatica     Primary insomnia     Vitamin D deficiency     Observed sleep apnea     Sacroiliac joint dysfunction     History of colonic polyps     Impingement syndrome of left shoulder     History of COVID-19     Essential hypertension     Hyperlipidemia LDL goal <100       Past Surgical History:   Procedure Laterality Date     AS COLONOSCOPY W BX FORCEP/CAUTERY REMOVAL KENYA/POLYP/LESION N/A 01/01/1999    stomache pain/family hx     COLONOSCOPY  01/01/2011    f/u polyectomy/ family HX crohns     COLONOSCOPY N/A 12/20/2018    Procedure: COLONOSCOPY with Polypectomy;  Surgeon: Mame" Yared MCDANIEL MD;  Location: [facility]     ORTHOPEDIC SURGERY      shoulder surgery     RECESSION RESECTION (REPAIR STRABISMUS) BILATERAL  10/18/2011    Procedure:RECESSION RESECTION (REPAIR STRABISMUS) BILATERAL; Strabismus Repair Right; Surgeon:MERCEDEZ CHAUHAN; Location: OR       Current Outpatient Medications   Medication     ACETAMINOPHEN EXTRA STRENGTH 500 MG tablet     atorvastatin (LIPITOR) 20 MG tablet     losartan (COZAAR) 50 MG tablet     diclofenac (VOLTAREN XR) 100 MG 24 hr tablet     No current facility-administered medications for this visit.          Allergies   Allergen Reactions     No Clinical Screening - See Comments      Seasonal allergies       Family History   Problem Relation Age of Onset     Cancer Mother         Brain - Cause of Death     Crohn's Disease Father         Cause of Death     Crohn's Disease Sister        Social History     Socioeconomic History     Marital status:    Occupational History     Occupation: MAINTENANCE     Employer: [employer]     Occupation: machinest   Tobacco Use     Smoking status: Never     Smokeless tobacco: Never   Substance and Sexual Activity     Alcohol use: Yes     Comment: occasional     Drug use: No   Other Topics Concern     Blood Transfusions Yes     Comment: PERMITS     Exercise Yes     Comment: WALKING - DAILY       ROS: 10 point ROS neg other than the symptoms noted above in the HPI.  EXAM  Constitutional: healthy, alert and no distress    Psychiatric: mentation appears normal and affect normal/bright    VASCULAR:  -Dorsalis pedis pulse +2/4 b/l  -Posterior tibial pulse +2/4 b/l  -Capillary refill time < 3 seconds to b/l hallux  -Hair growth Present to b/l anterior legs and ankles  NEURO:  -Light touch sensation intact to b/l plantar forefoot  DERM:  -Skin temperature, texture and turgor WNL b/l  MSK:  -Minimal pain on palpation to the medial tubercle of the calcaneus bilaterally   -Pain along the medial plantar fascia band to the  midfoot bilaterally   -Moderate decrease in arch height while patient is NWB, bilaterally   -Pain on palpation to the intermetatarsal interspaces  -Pain on palpation to multiple areas along the bilateral medial, lateral and posterior leg starting at the level of the ankle and extending to just distal to the knee.  ---Positive jump sign to all areas of palpation  ---Pain is noted to multiple locations on the sartorius (just distal to the knee along the insertion of the sartorius), gastrocnemius, soleus, and popliteus.  -Pain on palpation to the plantar metatarsal heads 2-4 bilaterally     -Muscle strength of ankles +5/5 for dorsiflexion, plantarflexion, ABDUction and ADDuction b/l    -Ankle joint passive ROM within normal limits except for dorsiflexion:    Dorsiflexion, RIGHT Straight knee 0 degrees    Dorsiflexion, LEFT Straight knee 0 degrees    ============================================================    ASSESSMENT:  (M72.2) Plantar fasciitis  (primary encounter diagnosis)    (G57.61) Lesion of right plantar nerve    (G57.62) Lesion of left plantar nerve    (M77.41) Metatarsalgia of right foot    (M77.42) Metatarsalgia of left foot    (M21.861) Gastrocnemius equinus of right lower extremity    (M21.862) Gastrocnemius equinus of left lower extremity    (M60.862) Other myositis of left lower extremity    (M60.861) Other myositis of right lower extremity      PLAN:  -Patient evaluated and examined. Treatment options discussed with no educational barriers noted.    -CMOs: Patient received the CMOs in early January, 2023 through the orthotist, Yessenia Duque. They do not fit in his work boots. He gets one pair of work boots per year and he says he is due for new work boots. He is advised to look for work boots that fit with his orthotics. If he cannot find some, then he should contact the orthotist, Yessenia Duque, to see if thinner orthotics can be dispensed to fit into his work boots.  -Patient is advised to start  massaging the trigger points in his leg and carefully massage his plantar foot with his thumb. He should also use a frozen water bottle on the plantar surface of his foot. Physical therapy could guide him with proper techniques and exercises and stretches. He is in agreement with this plan.  -Physical therapy ordered through [facility] at [facility] in [place], [place] on 01/18/2023 per patient request. They will work on Graston techniques, stretches of the calves, instrinsic muscle exercises for the foot, and possibly dry needling.     Total time spent preparing to see the patient, review of chart, obtaining history and physical examination, review of treatment options, education, discussion with patient and documenting in Epic / EMR was 22 minutes. This did not include procedure time.  All time involved was spent on the day of service for the patient (the same day as the patient's appointment).    -Patient in agreement with the above treatment plan and all of patient's questions were answered.      Return to clinic three months to evaluate bilateral foot pain after starting physical therapy        Margot Benavides DPM

## 2023-01-18 NOTE — PATIENT INSTRUCTIONS
-Physical therapy will be ordered through [facility] at [facility]  -Use a foam roller on your legs and plantar foot as tolerated  -Look for large work boots that accommodate your orthotics. If they still do not fit, call the orthotist, Yessenia Duque, to see if new orthotics can be made that fit in your work boots.

## 2023-02-27 DIAGNOSIS — I10 ESSENTIAL HYPERTENSION: ICD-10-CM

## 2023-02-27 RX ORDER — LOSARTAN POTASSIUM 50 MG/1
TABLET ORAL
Qty: 30 TABLET | Refills: 4 | Status: SHIPPED | OUTPATIENT
Start: 2023-02-27 | End: 2023-05-19

## 2023-03-07 ENCOUNTER — ALLIED HEALTH/NURSE VISIT (OUTPATIENT)
Dept: RESEARCH | Facility: CLINIC | Age: 58
End: 2023-03-07
Payer: COMMERCIAL

## 2023-03-07 VITALS
HEART RATE: 61 BPM | BODY MASS INDEX: 30.78 KG/M2 | OXYGEN SATURATION: 96 % | SYSTOLIC BLOOD PRESSURE: 124 MMHG | DIASTOLIC BLOOD PRESSURE: 85 MMHG | RESPIRATION RATE: 18 BRPM | HEIGHT: 70 IN | WEIGHT: 215 LBS

## 2023-03-07 DIAGNOSIS — Z00.6 EXAMINATION OF PARTICIPANT OR CONTROL IN CLINICAL RESEARCH: Primary | ICD-10-CM

## 2023-03-07 PROCEDURE — 99207 PR NO CHARGE NURSE ONLY: CPT

## 2023-03-07 NOTE — PROGRESS NOTES
"  [place] Sleep Study Inclusion/Exclusion Criteria:    Study Name: [place]  : Darell Lacey MD    Study Description: The purpose of this study is to collect sleep data for product research and development. We will compare the performance of the Smartwatch to a medical-grade Home Sleep Test (HST). We hope to learn whether the Smartwatch can be used to track sleep quality at home.    Protocol Version: 3.0  22-DEC-2022     Criteria #  Inclusion Criteria (ALL MUST BE YES)  YES/NO/N/A   1  Adult (age > 18 years old) Yes   2  Subject has a reliable WiFi network (examples of \"reliable\": able to video-conference call with a clear image, able to stream movies or video without interruption, play online computer games) Yes   3  Subject has access to a computer or smartphone with audiovisual capabilities (e.g., webcam and microphone/speaker*)  Yes   4  Subject is willing to comply with the study procedures    Yes         * applicable for subjects that are remotely consented and/or trained.     Criteria # Exclusion Criteria (ALL MUST BE NO) YES/NO/N/A   1  Active COVID infection   No   2  Decisionally impaired participants (no surrogate consenting is allowed)    No   3  Not understanding written and spoken English     No   4  Open wounds(s) on the wrist and/or forearm (in area where Smartwatch would be worn) No   5  Tattoos, large moles or scars on the dorsal aspect of wrist where the Smartwatch would be worn; individuals with tattoo on only one wrist may participate, if Smartwatch is worn on non-tattooed wrist    No   6  Known sensitivity or allergy to component of the Smartwatch   No   7  Planned travel across 2 or more times zones during study participation   No   8  Planned travel away from home for more than 5 days during the study period No   9  Overnight rotating shift work     No   10  Active and adherent to treatment for sleep apnea   (e.g., CPAP, dental appliance, Hypoglossal nerve stimulator)    " No   11  Plans to start treatment for apnea within the study timeframe    No   12  Overnight supplemental oxygen use   No   13    Employed by a company that develops or sells medical and/or fitness devices (e.g., ECG monitors, wearable fitness bands, sleep monitors, etc.) or are technology journalists (e.g., professional bloggers, TV, magazine, newspaper reporters, etc.) No   14    Participated in a previous sleep study that used a wrist-worn sensor device by same sponsor  No     Patient does fulfill study inclusion criteria and no exclusion criteria are found.     Darell Lacey MD    07-MAR-2023    Sara Behmanesh

## 2023-03-07 NOTE — PROGRESS NOTES
"[facility] In-Person Study Note    Study Description: The purpose of this study is to collect sleep data for product research and development. We will compare the performance of the Smartwatch to a medical-grade Home Sleep Test (HST). We hope to learn whether the Smartwatch can be used to track sleep quality at home.       Subject ID:      SCREENING     Demographic Info  Asif Leon   1965          57 year old  male    Race: White  Ethnicity: Non-/     Samuels Scale: OBAN Samuels: 2    Medical History:  Past Medical History:   Diagnosis Date     Hypertension       Hyperlipidemia LDL goal <100 2/2/2022       Sleep Apnea Diagnosis: No    Allergies:  Allergies   Allergen Reactions     No Clinical Screening - See Comments      Seasonal allergies        Current Medications:     Review of your medicines          Accurate as of March 7, 2023  9:22 AM. If you have any questions, ask your nurse or doctor.            CONTINUE these medicines which have NOT CHANGED      Dose / Directions       atorvastatin 20 MG tablet  Commonly known as: LIPITOR  Used for: Mixed hyperlipidemia   6/7/2022-ongoing   TAKE 1 TABLET BY MOUTH EVERY DAY  Quantity: 30 tablet  Refills: 7         losartan 50 MG tablet  Commonly known as: COZAAR  Used for: Essential hypertension    2/27/2023-ongiong      TAKE 1 TABLET BY MOUTH EVERY DAY  Quantity: 30 tablet  Refills: 4            Past Surgical History:  Past Surgical History:   Procedure Laterality Date     AS COLONOSCOPY W BX FORCEP/CAUTERY REMOVAL KENYA/POLYP/LESION N/A 01/01/1999     COLONOSCOPY  01/01/2011     COLONOSCOPY N/A 12/20/2018     ORTHOPEDIC SURGERY       RECESSION RESECTION (REPAIR STRABISMUS) BILATERAL  10/18/2011              Vitals:  /85 (BP Location: Left arm, Patient Position: Left side, Cuff Size: Adult Large)   Pulse 61   Resp 18   Ht 1.778 m (5' 10\")   Wt 97.5 kg (215 lb)   SpO2 96%   BMI 30.85 kg/m         Lifestyle:  Occupation: "      Do you have a Bed Partner/Co-Sleeper? Yes   Previous Sleep Study?: Yes       (If Yes) Initial AHI Score: 1.7    Alcohol Use: No  Smoking History: No       Measurements & Preferences:  Dominant Hand: Right   Preferred Watch Wrist: Left    Left Wrist:  Circumference (mm): 180   Hairiness: C: Thick Hair, Low Density  Tattoos, Moles, Scars? None    Right Wrist:   Circumference (mm): 180   Hairiness: C: Thick Hair, Low Density  Tattoos, Moles, Scars? None    Was data collected?: Yes    ENROLLMENT & DEVICES      Device IDs:  Watch ID: [id number]    Watch Size: 44 mm   Band Size: M/L  Natural Notch: 3   Secure Notch: 3   Watch Setting Worn: 3   Phone ID: [id number]  Nox ID: [id number]     Has the Subject Enrolled in the Study [place]: Yes   Confirmation of Enrollment?: Yes   Enrollment Date: 07-MAR-2023  Smartwatch Training Completed? Yes   Smartwatch Training Date: 07-MAR-2023  HSAT Training Completed? Yes   HSAT Training Date: 07-MAR-2023    07-MAR -2023  Sara Behmanesh

## 2023-03-07 NOTE — PROGRESS NOTES
[place] Study Consent    Study Description: The purpose of this study is to collect sleep data for product research and development. We will compare the performance of the Smartwatch to a medical-grade Home Sleep Test (HST). We hope to learn whether the Smartwatch can be used to track sleep quality at home.        Asif Leon a 57 year old male, was on-site  today to discuss participation in the [place] sleep data collection study.     The consent form was reviewed with the patient.     The review of the study included:    Study Purpose     COVID-19 Criteria     Participant Responsibilities      Study Data and Devices    Benefits and Risks of Participation    Compensation and Costs of Participation    Voluntary Participation    Confidentiality     Injury and Legal Rights    Protocol Version: 3.0    The subject was queried in regards to his willingness to continue and his questions were answered to his satisfaction.     The patient has given his agreement to volunteer and participate in the above noted study.     The Consent  and HIPAA form version 4.0 28-FEB-2023 was signed at 09:20 AM on  07-MAR-2023 with the Clinical Research Unit of [facility].     A copy of the [place] consent will be placed in subject's medical record. A copy of the consent form was given to the subject today.    Study data is directly entered into Epic and [employer] [facility] per protocol.     No study procedures were done prior to Asif Leon providing informed consent.       07-MAR-2023    Sara Behmanesh

## 2023-03-14 ENCOUNTER — VIRTUAL VISIT (OUTPATIENT)
Dept: RESEARCH | Facility: CLINIC | Age: 58
End: 2023-03-14
Payer: COMMERCIAL

## 2023-03-14 DIAGNOSIS — Z00.6 EXAMINATION OF PARTICIPANT OR CONTROL IN CLINICAL RESEARCH: Primary | ICD-10-CM

## 2023-03-14 PROCEDURE — 99207 PR NO CHARGE NURSE ONLY: CPT

## 2023-03-14 NOTE — PROGRESS NOTES
[place] HSAT Kit Return  Study Description: The purpose of this study is to collect sleep data for product research and development. We will compare the performance of the Smartwatch to a medical-grade Home Sleep Test (HST). We hope to learn whether the Smartwatch can be used to track sleep quality at home.      Subject Number:     Study Day: Week 2    Tracking Number: [id number]  or [id number]    Compliance Questions:  Did you wear a T-Shirt over the heart monitor? No   Did you double tape device tubing/wires?No   Did you turn-off your heart monitor each morning after collection?Yes  Did all equipment function correctly and stay-on throughout night?Yes  Did you see the red light underneath the watch turn-on both nights?Yes      Participant returned HSAT kit with all devices returned. Participant verbalized understanding that if their data is unusable per sponsor, they will conduct one additional set of HSAT recordings. All other questions/concerns addressed.     Adverse Events & Con Med Assessment Performed?   [x]     (If yes, please generate adverse event report for PI to cosign)    14-MAR-2023    Luis Foley

## 2023-03-20 ENCOUNTER — VIRTUAL VISIT (OUTPATIENT)
Dept: RESEARCH | Facility: CLINIC | Age: 58
End: 2023-03-20
Payer: COMMERCIAL

## 2023-03-20 DIAGNOSIS — Z00.6 RESEARCH SUBJECT: Primary | ICD-10-CM

## 2023-03-20 PROCEDURE — 99207 PR NO CHARGE NURSE ONLY: CPT

## 2023-03-20 NOTE — PROGRESS NOTES
[facility] HSAT Redeployment Phone Visit    Study Description: The purpose of this study is to collect sleep data for product research and development. We will compare the performance of the Smartwatch to a medical-grade Home Sleep Test (HST). We hope to learn whether the Smartwatch can be used to track sleep quality at home.      Subject Number:     Study Day: Week 4    [facility] Study Subject was called today to inform them their data from their first set of HSAT nights was unusable thus necessitating another set of HSAT nights. Issues with participant's first set of HSAT recordings reviewed.      [employer] Tracking #: [id number]  NOX ID: [id number]  Nonin ID: [id number]    Reeducation Checklist:   [x] Complete Evening Task prior to HSAT Workflow   [x] Secure nasal cannula and finger sense with medical tape  [x] Did you wear a second T-Shirt over the Nox?   [x] Nox recording turned off in the morning following HSAT night [x] Ensure morning survey is completed for participant's data upload    Adverse Events & Con Med Assessment Performed?   [x] None    (If yes, please generate adverse event report for PI to cosign)      20-MAR-2023     Jody Finley RN

## 2023-03-24 ENCOUNTER — TELEPHONE (OUTPATIENT)
Dept: RESEARCH | Facility: CLINIC | Age: 58
End: 2023-03-24

## 2023-03-24 ENCOUNTER — VIRTUAL VISIT (OUTPATIENT)
Dept: RESEARCH | Facility: CLINIC | Age: 58
End: 2023-03-24
Payer: COMMERCIAL

## 2023-03-24 DIAGNOSIS — Z00.6 RESEARCH SUBJECT: Primary | ICD-10-CM

## 2023-03-24 PROCEDURE — 99207 PR NO CHARGE NURSE ONLY: CPT

## 2023-03-24 NOTE — TELEPHONE ENCOUNTER
[place] HSAT Results Phone Call  Study Description: The purpose of this study is to collect sleep data for product research and development. We will compare the performance of the Smartwatch to a medical-grade Home Sleep Test (HST). We hope to learn whether the Smartwatch can be used to track sleep quality at home.      Asif Leon was called today to review results of his Home Sleep Test (HSAT).     A voicemail was left with the significance of the results. I also informed them that a copy of the results are available in their chart for review by their provider.     Additional Comments:  only night 1: Lowest O2 85%; Low baseline O2 at 90.7%; < 90% 62.3 minutes; < 88% 21.8 minutes; Snore 32.8%; Supine 17.7%; HR  NSR with bigeminy PVCs noted    AHI Score: AHI less than 5  : scores 3.0 and 2.6    Results:  NIGHT 1  Was it scorable?: Yes     Channel Presence: Scored and all 6 channels present  Select Missing Channels: N/A     (If applicable)      Analysis Start Date: 3/13/23   Analysis Duration: 6hr 43min  Analysis Start Time: 9:19 pm       Analysis Stop Time: 4:02 am   Est Total Sleep Time: 6 hr 15 min      NIGHT 1 Scorer 1 Scorer 2   Respiratory Parameters   Apnea + Hypopneas (AH)   Total    3.0 /h   2.6 /h   Supine    6.3 /h   6.3 /h   Non-Supine   2.3 /h   1.7 /h   Count   19    16        Total Total    Obstructive (OA)    0.2 /h   0.3 /h   Hypopneas    2.9 /h   2.2 /h   Central Apnea Hypopnea (CA+CH)    0 /h   0 /h   Oxygen Saturation (SpO2)   Oxygen Desaturation Index (CISCO)    2.7 /h   4.5 /h   Minimum SpO2    85 %   85 %   SpO2 Duration < 88%    5.8 %   5.8 %   Average Desat Drop    4.6 %   4.5 %   Position & Analysis Time   Supine (in TST) Duration    66.6 min   66.6 min       NIGHT 2  Was it scorable?: No (If no, delete the table below)     24-MAR-2023    Cyn Bell PA-C

## 2023-03-24 NOTE — PROGRESS NOTES
" [place] Study Phone Visit    Study Description: The purpose of this study is to collect sleep data for product research and development. We will compare the performance of the Smartwatch to a medical-grade Home Sleep Test (HST). We hope to learn whether the Smartwatch can be used to track sleep quality at home.      Subject Number:     Study Day: Week 3    [facility] Study Subject was called today to:    Review Compliance     Answer subject questions    Deliver study protocol reminders to subject    Reminders Checklist:   [x]  Connected to WiFi  [x]  Completing both morning and evening surveys  [x]  Watch and Phone on  near each other after completed morning survey   [x]  Take devices off before showering/getting them wet  [x]  Make sure to dismiss any update notifications. -Tap \"Not Now\"  [x]  Good HSAT   [x]  Remind them of next appointment with us       Adverse Events & Con Med Assessment Performed?   [x]     (If yes, please generate adverse event report for PI to cosign)      24-MAR-2023    Nighat Schultz      "

## 2023-03-27 ENCOUNTER — ALLIED HEALTH/NURSE VISIT (OUTPATIENT)
Dept: RESEARCH | Facility: CLINIC | Age: 58
End: 2023-03-27
Payer: COMMERCIAL

## 2023-03-27 DIAGNOSIS — Z00.6 EXAMINATION OF PARTICIPANT OR CONTROL IN CLINICAL RESEARCH: Primary | ICD-10-CM

## 2023-03-27 PROCEDURE — 99207 PR NO CHARGE NURSE ONLY: CPT

## 2023-03-27 NOTE — PROGRESS NOTES
[facility] HSAT Redeployment Kit Return  Study Description: The purpose of this study is to collect sleep data for product research and development. We will compare the performance of the Smartwatch to a medical-grade Home Sleep Test (HST). We hope to learn whether the Smartwatch can be used to track sleep quality at home.      Subject Number:     Study Day: Week 3    Tracking Number: N/A    Compliance Questions:  Did you wear a T-Shirt over the heart monitor? No   Did you double tape device tubing/wires?No   Did you turn-off your heart monitor each morning after collection?Yes  Did all equipment function correctly and stay-on throughout night?Yes  Did you see the red light underneath the watch turn-on both nights?Yes      Participant returned HSAT kit with all devices returned. Participant verbalized understanding that if their data is unusable per sponsor, they will conduct one additional set of HSAT recordings. All other questions/concerns addressed.     Adverse Events & Con Med Assessment Performed?   [x]     (If yes, please generate adverse event report for PI to luana)    27-MAR-2023    Luis Foley

## 2023-03-31 ENCOUNTER — TELEPHONE (OUTPATIENT)
Dept: RESEARCH | Facility: CLINIC | Age: 58
End: 2023-03-31

## 2023-03-31 ENCOUNTER — VIRTUAL VISIT (OUTPATIENT)
Dept: RESEARCH | Facility: CLINIC | Age: 58
End: 2023-03-31
Payer: COMMERCIAL

## 2023-03-31 DIAGNOSIS — Z00.6 EXAMINATION OF PARTICIPANT OR CONTROL IN CLINICAL RESEARCH: Primary | ICD-10-CM

## 2023-03-31 PROCEDURE — 99207 PR NO CHARGE NURSE ONLY: CPT

## 2023-03-31 NOTE — PROGRESS NOTES
" [place] Study Phone Visit    Study Description: The purpose of this study is to collect sleep data for product research and development. We will compare the performance of the Smartwatch to a medical-grade Home Sleep Test (HST). We hope to learn whether the Smartwatch can be used to track sleep quality at home.      Subject Number:     Study Day: Week 4    [place] Study Subject was called today to:    Review Compliance     Answer subject questions    Deliver study protocol reminders to subject    Reminders Checklist:   [x]  Connected to WiFi  [x]  Completing both morning and evening surveys  [x]  Watch and Phone on  near each other after completed morning survey   [x]  Take devices off before showering/getting them wet  [x]  Make sure to dismiss any update notifications. -Tap \"Not Now\"  [x]  Good HSAT   [x]  Remind them of next appointment with us     Adverse Events & Con Med Assessment Performed?   [x]     (If yes, please generate adverse event report for PI to luana)      31-MAR-2023    Luis Foley    "

## 2023-03-31 NOTE — TELEPHONE ENCOUNTER
[facility] HSAT Results Phone Call  Study Description: The purpose of this study is to collect sleep data for product research and development. We will compare the performance of the Smartwatch to a medical-grade Home Sleep Test (HST). We hope to learn whether the Smartwatch can be used to track sleep quality at home.      Asif VASQUEZ Williamvelvet was called today to review results of his Home Sleep Test (HSAT).     A voicemail was left with the significance of the results. I also informed them that a copy of the results are available in their chart for review by their provider.     Additional Comments: HR  average of 66.9 bpm;  over 90 bpm was 0.4-2.1 minutes and over 100 bpm was 0.2 minutes occasional PVC noted    AHI Score: AHI of 5 to less than 15 (Mild SE) 3.8, 3.9 6.5, 6.7 average is 5.02     Results:  Redeployment NIGHT 1  Was it scorable?: Yes     Channel Presence: Scored and all 6 channels present  Select Missing Channels: N/A     (If applicable)      Analysis Start Date: 3/21/2023   Analysis Duration: 6hr 31min  Analysis Start Time: 9:50 pm        Analysis Stop Time: 4:21 am    Est Total Sleep Time: 6 hr 22 min      NIGHT 1 Scorer 1 Scorer 2   Respiratory Parameters   Apnea + Hypopneas (AH)   Total    3.8 /h   3.9 /h   Supine    14.9 /h   17 /h   Non-Supine   2.9 /h   2.9 /h   Count   24    25        Total Total    Obstructive (OA)    0.2 /h   0.2 /h   Hypopneas    3.6 /h   3.8 /h   Central Apnea Hypopnea (CA+CH)    0 /h   0 /h   Oxygen Saturation (SpO2)   Oxygen Desaturation Index (CISCO)    4.2 /h   4.2 /h   Minimum SpO2    85 %   85 %   SpO2 Duration < 88%    3.8 %   3.8 %   Average Desat Drop    4.8 %   4.8 %   Position & Analysis Time   Supine (in TST) Duration    28.2 min   28.2 min       Redeployment NIGHT 2  Was it scorable?: Yes (If no, delete the table below)     Channel Presence: Scored and all 6 channels present  Select Missing Channels: N/A     (If applicable)    Analysis Start Date: 3/22/2023    Analysis Duration: 6hr  17m  Analysis Start Time: 10:07 pm      Analysis Stop Time: 4:24 am    Est Total Sleep Time: 5h 31m       NIGHT 2 Scorer 1 Scorer 2   Respiratory Parameters   Apnea + Hypopneas (AH)   Total    6.5 /h   6.7 /h   Supine    33.9 /h   42.4 /h   Non-Supine   5.3 /h   5.1 /h   Count   36    37        Total Total    Obstructive (OA)    0 /h   0.7 /h   Hypopneas    6.3 /h   5.8 /h   Central Apnea Hypopnea (CA+CH)    0.2 /h   0.2 /h   Oxygen Saturation (SpO2)   Oxygen Desaturation Index (CISCO)    6.9 /h   6.9 /h   Minimum SpO2    84 %   84 %   SpO2 Duration < 88%    7 %   7 %   Average Desat Drop    4.8 %   4.8 %   Position & Analysis Time   Supine (in TST) Duration    14.2 min   14.2 min       31-MAR-2023    Valerie Gallo NP

## 2023-04-04 ENCOUNTER — TELEPHONE (OUTPATIENT)
Dept: FAMILY MEDICINE | Facility: OTHER | Age: 58
End: 2023-04-04

## 2023-04-04 NOTE — TELEPHONE ENCOUNTER
1:38 PM    Reason for Call: OVERBOOK    Patient is having the following symptoms: Patient needs to be seen for cough/congestion for a little over a week. days. Patient would need 3 or later    The patient is requesting an appointment for Overbook with Cristela Trivedi.    Was an appointment offered for this call? Yes  If yes : Appointment type              Date    05/23    Preferred method for responding to this message: Telephone Call  What is your phone number ?  [phone]    If we cannot reach you directly, may we leave a detailed response at the number you provided? Yes    Can this message wait until your PCP/provider returns, if unavailable today? YES, Provider is dominga Cheney

## 2023-04-05 ENCOUNTER — OFFICE VISIT (OUTPATIENT)
Dept: FAMILY MEDICINE | Facility: OTHER | Age: 58
End: 2023-04-05
Attending: NURSE PRACTITIONER
Payer: COMMERCIAL

## 2023-04-05 VITALS
SYSTOLIC BLOOD PRESSURE: 127 MMHG | HEART RATE: 83 BPM | OXYGEN SATURATION: 96 % | WEIGHT: 224.1 LBS | DIASTOLIC BLOOD PRESSURE: 70 MMHG | RESPIRATION RATE: 19 BRPM | TEMPERATURE: 97 F | BODY MASS INDEX: 32.16 KG/M2

## 2023-04-05 DIAGNOSIS — R05.1 ACUTE COUGH: ICD-10-CM

## 2023-04-05 DIAGNOSIS — J20.9 ACUTE BRONCHITIS, UNSPECIFIED ORGANISM: Primary | ICD-10-CM

## 2023-04-05 PROCEDURE — 99213 OFFICE O/P EST LOW 20 MIN: CPT | Performed by: NURSE PRACTITIONER

## 2023-04-05 RX ORDER — AZITHROMYCIN 250 MG/1
TABLET, FILM COATED ORAL
Qty: 11 TABLET | Refills: 0 | Status: SHIPPED | OUTPATIENT
Start: 2023-04-05 | End: 2023-04-15

## 2023-04-05 RX ORDER — BENZONATATE 200 MG/1
200 CAPSULE ORAL 3 TIMES DAILY PRN
Qty: 30 CAPSULE | Refills: 0 | Status: SHIPPED | OUTPATIENT
Start: 2023-04-05 | End: 2023-04-15

## 2023-04-05 RX ORDER — ALBUTEROL SULFATE 90 UG/1
2 AEROSOL, METERED RESPIRATORY (INHALATION) EVERY 6 HOURS PRN
Qty: 18 G | Refills: 0 | Status: SHIPPED | OUTPATIENT
Start: 2023-04-05 | End: 2023-04-27

## 2023-04-05 ASSESSMENT — ANXIETY QUESTIONNAIRES
5. BEING SO RESTLESS THAT IT IS HARD TO SIT STILL: NOT AT ALL
4. TROUBLE RELAXING: NOT AT ALL
3. WORRYING TOO MUCH ABOUT DIFFERENT THINGS: NOT AT ALL
GAD7 TOTAL SCORE: 0
2. NOT BEING ABLE TO STOP OR CONTROL WORRYING: NOT AT ALL
1. FEELING NERVOUS, ANXIOUS, OR ON EDGE: NOT AT ALL
6. BECOMING EASILY ANNOYED OR IRRITABLE: NOT AT ALL
7. FEELING AFRAID AS IF SOMETHING AWFUL MIGHT HAPPEN: NOT AT ALL
GAD7 TOTAL SCORE: 0

## 2023-04-05 ASSESSMENT — PAIN SCALES - GENERAL: PAINLEVEL: NO PAIN (0)

## 2023-04-05 NOTE — PATIENT INSTRUCTIONS
Assessment & Plan          Acute bronchitis, unspecified organism  - albuterol (PROAIR HFA/PROVENTIL HFA/VENTOLIN HFA) 108 (90 Base) MCG/ACT inhaler; Inhale 2 puffs into the lungs every 6 hours as needed for shortness of breath, wheezing or cough  - azithromycin (ZITHROMAX) 250 MG tablet; Take 2 tablets (500 mg) by mouth daily for 1 day, THEN 1 tablet (250 mg) daily for 9 days.          Acute cough  - benzonatate (TESSALON) 200 MG capsule; Take 1 capsule (200 mg) by mouth 3 times daily as needed for cough          Insure adequate fluid intake  Get plenty of rest  Monitor for temp at home, treat with OTC Tylenol or Ibuprofen per package instruction.  Humidity at home (add bacteriostatic solution to humidifier)  Please return in you do not improve  To UC or ER with persistent, worsening, or concerning symptoms          Laury Trivedi CNP  [facility] [place] [facility]   You may stop taking your aspirin.

## 2023-04-05 NOTE — PROGRESS NOTES
Assessment & Plan          Acute bronchitis, unspecified organism  - albuterol (PROAIR HFA/PROVENTIL HFA/VENTOLIN HFA) 108 (90 Base) MCG/ACT inhaler; Inhale 2 puffs into the lungs every 6 hours as needed for shortness of breath, wheezing or cough  - azithromycin (ZITHROMAX) 250 MG tablet; Take 2 tablets (500 mg) by mouth daily for 1 day, THEN 1 tablet (250 mg) daily for 9 days.        Acute cough  - benzonatate (TESSALON) 200 MG capsule; Take 1 capsule (200 mg) by mouth 3 times daily as needed for cough          Insure adequate fluid intake  Get plenty of rest  Monitor for temp at home, treat with OTC Tylenol or Ibuprofen per package instruction.  Humidity at home (add bacteriostatic solution to humidifier)  Please return in you do not improve  To UC or ER with persistent, worsening, or concerning symptoms          Laury Trivedi CNP  [facility] - [facility] is a 58 year old, presenting for the following health issues:  Cough      Acute Illness  Acute illness concerns: cough  Onset/Duration: 2 weeks  Symptoms:  Fever: YES  Chills/Sweats: YES  Headache (location?): No  Sinus Pressure: No  Conjunctivitis:  No  Ear Pain: no  Rhinorrhea: YES  Congestion: YES  Sore Throat: No  Cough: YES-productive of clear sputum  Wheeze: No  Decreased Appetite: No  Nausea: No  Vomiting: No  Diarrhea: No  Dysuria/Freq.: No  Dysuria or Hematuria: No  Fatigue/Achiness: No  Sick/Strep Exposure: No  Therapies tried and outcome: mucinex-cold and flu       Patient Active Problem List   Diagnosis     Anxiety     Chronic midline low back pain without sciatica     Primary insomnia     Vitamin D deficiency     Observed sleep apnea     Sacroiliac joint dysfunction     History of colonic polyps     Impingement syndrome of left shoulder     History of COVID-19     Essential hypertension     Hyperlipidemia LDL goal <100     Past Surgical History:   Procedure Laterality Date     AS COLONOSCOPY W BX FORCEP/CAUTERY REMOVAL  KENYA/POLYP/LESION N/A 01/01/1999    stomache pain/family hx     COLONOSCOPY  01/01/2011    f/u polyectomy/ family HX crohns     COLONOSCOPY N/A 12/20/2018    Procedure: COLONOSCOPY with Polypectomy;  Surgeon: Yared Vilchis MD;  Location: [facility]     ORTHOPEDIC SURGERY      shoulder surgery     RECESSION RESECTION (REPAIR STRABISMUS) BILATERAL  10/18/2011    Procedure:RECESSION RESECTION (REPAIR STRABISMUS) BILATERAL; Strabismus Repair Right; Surgeon:MERCEDEZ CHAUHAN; Location: OR       Social History     Tobacco Use     Smoking status: Never     Smokeless tobacco: Never   Vaping Use     Vaping status: Not on file   Substance Use Topics     Alcohol use: Yes     Comment: occasional     Family History   Problem Relation Age of Onset     Cancer Mother         Brain - Cause of Death     Crohn's Disease Father         Cause of Death     Crohn's Disease Sister            Current Outpatient Medications   Medication Sig Dispense Refill     ACETAMINOPHEN EXTRA STRENGTH 500 MG tablet        atorvastatin (LIPITOR) 20 MG tablet TAKE 1 TABLET BY MOUTH EVERY DAY 30 tablet 7     losartan (COZAAR) 50 MG tablet TAKE 1 TABLET BY MOUTH EVERY DAY 30 tablet 4     Allergies   Allergen Reactions     No Clinical Screening - See Comments      Seasonal allergies         Recent Labs   Lab Test 09/29/22  1350 08/02/22  1538 02/02/22  1419 08/04/21  1412 08/04/21  1412 07/09/21  1356 05/17/21  1609 05/22/19  0932 01/16/18  0730   A1C 5.6  --   --   --   --   --   --   --   --    LDL  --  64 60  --   --  93  --    < > 93   HDL  --  47 49  --   --  39*  --    < > 43   TRIG  --  112 151*  --   --  277*  --    < > 233*   ALT  --  48 60  --  52 54 47   < >  --    CR  --  0.99 1.03   < > 1.05 0.95 0.99   < >  --    GFRESTIMATED  --  88 85   < > 78 88 84   < >  --    GFRESTBLACK  --   --   --   --   --  >90 >90   < >  --    POTASSIUM  --  4.0 3.5   < > 3.7 3.7 3.7   < >  --    TSH  --   --  2.31  --   --   --   --   --  2.80    < > = values in this  interval not displayed.            BP Readings from Last 3 Encounters:   04/05/23 127/70   03/07/23 124/85   01/18/23 128/74    Wt Readings from Last 3 Encounters:   04/05/23 101.7 kg (224 lb 1.6 oz)   03/07/23 97.5 kg (215 lb)   01/18/23 95.3 kg (210 lb)                   Review of Systems   Constitutional, HEENT, cardiovascular, pulmonary, gi and gu systems are negative, except as otherwise noted.            Objective    /70 (BP Location: Right arm, Patient Position: Sitting, Cuff Size: Adult Regular)   Pulse 83   Temp 97  F (36.1  C) (Tympanic)   Resp 19   Wt 101.7 kg (224 lb 1.6 oz)   SpO2 96%   BMI 32.16 kg/m    Body mass index is 32.16 kg/m .           Physical Exam   GENERAL: healthy, alert and no distress  EYES: Eyes grossly normal to inspection, PERRL and conjunctivae and sclerae normal  HENT: ear canals and TM's normal, nose and mouth without ulcers or lesions  NECK: no adenopathy, no asymmetry, masses, or scars and thyroid normal to palpation  RESP: chest congestion, scattered wheezing, deep cough  CV: regular rate and rhythm, normal S1 S2, no S3 or S4, no murmur, click or rub, no peripheral edema and peripheral pulses strong  SKIN: no suspicious lesions or rashes  PSYCH: mentation appears normal, affect normal/bright

## 2023-04-07 ENCOUNTER — VIRTUAL VISIT (OUTPATIENT)
Dept: RESEARCH | Facility: CLINIC | Age: 58
End: 2023-04-07
Payer: COMMERCIAL

## 2023-04-07 DIAGNOSIS — Z00.6 EXAMINATION OF PARTICIPANT OR CONTROL IN CLINICAL RESEARCH: Primary | ICD-10-CM

## 2023-04-07 PROCEDURE — 99207 PR NO CHARGE NURSE ONLY: CPT

## 2023-04-07 NOTE — PROGRESS NOTES
" [place] Study Phone Visit    Study Description: The purpose of this study is to collect sleep data for product research and development. We will compare the performance of the Smartwatch to a medical-grade Home Sleep Test (HST). We hope to learn whether the Smartwatch can be used to track sleep quality at home.      Subject Number:     Study Day: Week 5    [place] Study Subject was called today to:    Review Compliance     Answer subject questions    Deliver study protocol reminders to subject    Reminders Checklist:   [x]  Connected to WiFi  [x]  Completing both morning and evening surveys  [x]  Watch and Phone on  near each other after completed morning survey   [x]  Take devices off before showering/getting them wet  [x]  Make sure to dismiss any update notifications. -Tap \"Not Now\"  [x] Good HSAT   [x]  Remind them of next appointment with us     (Applicable during last 4 days)  [x]  NO WATCH WEAR JUST SURVEY     Adverse Events & Con Med Assessment Performed?   [x]     (If yes, please generate adverse event report for PI to cosign)      7-APR-2023    Luis Foley    "

## 2023-04-12 ENCOUNTER — VIRTUAL VISIT (OUTPATIENT)
Dept: RESEARCH | Facility: CLINIC | Age: 58
End: 2023-04-12
Payer: COMMERCIAL

## 2023-04-12 DIAGNOSIS — Z00.6 EXAMINATION OF PARTICIPANT OR CONTROL IN CLINICAL RESEARCH: Primary | ICD-10-CM

## 2023-04-12 PROCEDURE — 99207 PR NO CHARGE NURSE ONLY: CPT

## 2023-04-12 NOTE — PROGRESS NOTES
[place] Study End Note    Study Description: The purpose of this study is to collect sleep data for product research and development. We will compare the performance of the Smartwatch to a medical-grade Home Sleep Test (HST). We hope to learn whether the Smartwatch can be used to track sleep quality at home.        Study Termination/Completion Date: 12-APR-2023  Reason for Termination (If Applicable): Completed     Subject returned all study devices today and this completes this study for the subject.    Adverse Events & Con Med Assessment Performed?   [x]       12-APR-2023    JAVIER Campbell

## 2023-04-17 ENCOUNTER — OFFICE VISIT (OUTPATIENT)
Dept: PODIATRY | Facility: OTHER | Age: 58
End: 2023-04-17
Attending: PODIATRIST
Payer: COMMERCIAL

## 2023-04-17 VITALS
TEMPERATURE: 97.7 F | OXYGEN SATURATION: 95 % | SYSTOLIC BLOOD PRESSURE: 118 MMHG | DIASTOLIC BLOOD PRESSURE: 79 MMHG | HEART RATE: 70 BPM

## 2023-04-17 DIAGNOSIS — M72.2 PLANTAR FASCIITIS: Primary | ICD-10-CM

## 2023-04-17 DIAGNOSIS — L60.3 ONYCHODYSTROPHY: ICD-10-CM

## 2023-04-17 PROCEDURE — 99213 OFFICE O/P EST LOW 20 MIN: CPT | Performed by: PODIATRIST

## 2023-04-17 PROCEDURE — 87101 SKIN FUNGI CULTURE: CPT | Performed by: PODIATRIST

## 2023-04-17 PROCEDURE — 87107 FUNGI IDENTIFICATION MOLD: CPT | Performed by: PODIATRIST

## 2023-04-17 ASSESSMENT — PAIN SCALES - GENERAL: PAINLEVEL: MODERATE PAIN (5)

## 2023-04-17 NOTE — PROGRESS NOTES
Chief complaint: Patient presents with:  Musculoskeletal Problem: Follow up foot pain      History of Present Illness: This 58 year old male is seen for follow-up management of bilateral foot pain (LEFT more than RIGHT).      He received new CMOs through the orthotist, Yessenia Duque, since early January, 2023. He wears them daily. He is working on calf stretches. He says he needs to be better about not walking barefooted around his house. He did not try physical therapy because he wanted to try hi own stretches first. He is also massaging the calves. His overall pain has improved since his last appointment. Some days he is pain free, but some days he still has pain. The LEFT calf is always tight and it has been tight since the LEFT knee was replaced in 2020 with a revision in 2021. He sometimes wears [place] shoes, but he is still mostly walking barefooted at home. He is wondering what other options are available for the plantar foot pain.    Additionally, patient has thickened toenails on both feet. He would like to know if he has toenail fungi. He says the nails have been thickened for years and he'd like to discuss treatment options.    No further pedal complaints today.         /79 (BP Location: Left arm, Patient Position: Sitting, Cuff Size: Adult Regular)   Pulse 70   Temp 97.7  F (36.5  C) (Tympanic)   SpO2 95%     Patient Active Problem List   Diagnosis     Anxiety     Chronic midline low back pain without sciatica     Primary insomnia     Vitamin D deficiency     Observed sleep apnea     Sacroiliac joint dysfunction     History of colonic polyps     Impingement syndrome of left shoulder     History of COVID-19     Essential hypertension     Hyperlipidemia LDL goal <100       Past Surgical History:   Procedure Laterality Date     AS COLONOSCOPY W BX FORCEP/CAUTERY REMOVAL KENYA/POLYP/LESION N/A 01/01/1999    stomache pain/family hx     COLONOSCOPY  01/01/2011    f/u polyectomy/ family HX crohns      COLONOSCOPY N/A 12/20/2018    Procedure: COLONOSCOPY with Polypectomy;  Surgeon: Yared Vilchis MD;  Location: [facility]     ORTHOPEDIC SURGERY      shoulder surgery     RECESSION RESECTION (REPAIR STRABISMUS) BILATERAL  10/18/2011    Procedure:RECESSION RESECTION (REPAIR STRABISMUS) BILATERAL; Strabismus Repair Right; Surgeon:MERCEDEZ CHAUHAN; Location: OR       Current Outpatient Medications   Medication     ACETAMINOPHEN EXTRA STRENGTH 500 MG tablet     atorvastatin (LIPITOR) 20 MG tablet     losartan (COZAAR) 50 MG tablet     albuterol (PROAIR HFA/PROVENTIL HFA/VENTOLIN HFA) 108 (90 Base) MCG/ACT inhaler     No current facility-administered medications for this visit.          Allergies   Allergen Reactions     No Clinical Screening - See Comments      Seasonal allergies       Family History   Problem Relation Age of Onset     Cancer Mother         Brain - Cause of Death     Crohn's Disease Father         Cause of Death     Crohn's Disease Sister        Social History     Socioeconomic History     Marital status:    Occupational History     Occupation: MAINTENANCE     Employer: [employer]     Occupation: machinest   Tobacco Use     Smoking status: Never     Smokeless tobacco: Never   Substance and Sexual Activity     Alcohol use: Yes     Comment: occasional     Drug use: No   Other Topics Concern     Blood Transfusions Yes     Comment: PERMITS     Exercise Yes     Comment: WALKING - DAILY       ROS: 10 point ROS neg other than the symptoms noted above in the HPI.  EXAM  Constitutional: healthy, alert and no distress    Psychiatric: mentation appears normal and affect normal/bright    VASCULAR:  -Dorsalis pedis pulse +2/4 b/l  -Posterior tibial pulse +2/4 b/l  -Capillary refill time < 3 seconds to b/l hallux  -Hair growth Present to b/l anterior legs and ankles  NEURO:  -Light touch sensation intact to b/l plantar forefoot  DERM:  -Skin temperature, texture and turgor WNL b/l  -Toenails  thickened, discolored and dystrophic x 10  MSK:  -No pain on palpation to the medial tubercle of the calcaneus bilaterally   -Pain along the medial and central plantar fascia band to the midfoot bilaterally   -Moderate decrease in arch height while patient is NWB, bilaterally   -Pain on palpation to the intermetatarsal interspaces  -Muscle strength of ankles +5/5 for dorsiflexion, plantarflexion, ABDUction and ADDuction b/l    -Ankle joint passive ROM within normal limits except for dorsiflexion:    Dorsiflexion, RIGHT Straight knee 0 degrees    Dorsiflexion, LEFT Straight knee 0 degrees    ============================================================    ASSESSMENT:  (M72.2) Plantar fasciitis  (primary encounter diagnosis)    (L60.3) Onychodystrophy        PLAN:  -Patient evaluated and examined. Treatment options discussed with no educational barriers noted.    Plantar Fascia pain:  -Patient's pain has improved, but he still has pain along the medial and central band of the bilateral foot. He lives in [place], but works in [place]. He is willing to try PT and he would like to consider this in [place]. He gets out of work at 4:30, so he'd like to try [facility].   ---PT referral placed through [facility] on 04/17/2023 to work on a [place]aston technique and iontophoresis. A referral was previously made through [facility], but he did not go to PT because he does not want to do PT on his days off (Fridays).  ---Continue CMOs at at all times when walking and wear in stability tennis shoes.  ---Minimally wear supportive sandals (he has Birkenstock sandals) at home and avoid barefoot walking    -------------    Onychodystrophy  -Discussed etiologies and treatment options for onychodystrophy. There may be fungi in the toenail, but it is not known until a nail culture is performed. Treatment options consist of leaving the toenail as is, treating the nail for fungi (culture would be taken today to confirm nail  fungi), a nail avulsion and treating the fungi as the nail grows back in, or removing the toenail permanently. The oral medication can sometimes fluctuate liver values so the patient would need to get a liver blood draw before, during and after the 90 day treatment. Topical anti-fungals can be used, but they do not always full cure the toenail fungi and they must be used daily and sometimes for 6-12 months before noticing a difference in the toenail. Recurrence rate of toenail fungi is high. After reviewing risks and benefits, patient has decided to proceed with oral Lamisil and topical anti-fungal solution if the results are positive for funti.  ---Toenail fungal culture obtained on 04/17/2023. It can take between 24 hours and 30 days for the final results. Patient understands this time frame. The patient will be called with the results once they are positive or at the end of the 30 day period once the final results comes back negative.    Total time spent preparing to see the patient, review of chart, obtaining history and physical examination, review of  treatment options, education, discussion with patient and documenting in Epic / EMR was 22 minutes. All time involved was spent on the day of service for the patient (the same day as the patient's appointment).    -Patient in agreement with the above treatment plan and all of patient's questions were answered.      Return to clinic three months to evaluate bilateral foot pain after starting physical therapy        Margot Benavides DPM

## 2023-04-20 DIAGNOSIS — B35.1 ONYCHOMYCOSIS: Primary | ICD-10-CM

## 2023-04-20 NOTE — PROGRESS NOTES
Patient's toenail culture results were positive for fungi. He would like to try both oral Lamisil and a topical anti-fungal.    Ordered a blood test on 04/20/2023 to check his liver values as the liver function must be within normal limits before oral Lamisil will be prescribed. The medication will be prescribed if the labs ar within normal limits.

## 2023-04-27 DIAGNOSIS — J20.9 ACUTE BRONCHITIS, UNSPECIFIED ORGANISM: ICD-10-CM

## 2023-04-27 RX ORDER — ALBUTEROL SULFATE 90 UG/1
2 AEROSOL, METERED RESPIRATORY (INHALATION) EVERY 6 HOURS PRN
Qty: 18 G | Refills: 1 | Status: SHIPPED | OUTPATIENT
Start: 2023-04-27 | End: 2024-03-15

## 2023-05-03 DIAGNOSIS — E78.2 MIXED HYPERLIPIDEMIA: ICD-10-CM

## 2023-05-03 RX ORDER — ATORVASTATIN CALCIUM 20 MG/1
TABLET, FILM COATED ORAL
Qty: 30 TABLET | Refills: 4 | Status: SHIPPED | OUTPATIENT
Start: 2023-05-03 | End: 2023-05-19

## 2023-05-09 LAB
BACTERIA SPEC CULT: ABNORMAL

## 2023-05-19 DIAGNOSIS — I10 ESSENTIAL HYPERTENSION: ICD-10-CM

## 2023-05-19 DIAGNOSIS — E78.2 MIXED HYPERLIPIDEMIA: ICD-10-CM

## 2023-05-19 RX ORDER — ATORVASTATIN CALCIUM 20 MG/1
20 TABLET, FILM COATED ORAL DAILY
Qty: 90 TABLET | Refills: 1 | Status: SHIPPED | OUTPATIENT
Start: 2023-05-19 | End: 2023-10-23

## 2023-05-19 RX ORDER — LOSARTAN POTASSIUM 50 MG/1
50 TABLET ORAL DAILY
Qty: 90 TABLET | Refills: 1 | Status: SHIPPED | OUTPATIENT
Start: 2023-05-19 | End: 2023-10-23

## 2023-06-29 ENCOUNTER — HOSPITAL ENCOUNTER (OUTPATIENT)
Dept: MRI IMAGING | Facility: OTHER | Age: 58
Discharge: HOME | End: 2023-06-29
Admitting: PHYSICIAN ASSISTANT
Payer: COMMERCIAL

## 2023-06-29 DIAGNOSIS — M48.062 SPINAL STENOSIS, LUMBAR REGION, WITH NEUROGENIC CLAUDICATION: ICD-10-CM

## 2023-06-29 PROCEDURE — 72148 MRI LUMBAR SPINE W/O DYE: CPT

## 2023-10-17 ENCOUNTER — TRANSFERRED RECORDS (OUTPATIENT)
Dept: HEALTH INFORMATION MANAGEMENT | Facility: CLINIC | Age: 58
End: 2023-10-17

## 2024-01-27 ENCOUNTER — HEALTH MAINTENANCE LETTER (OUTPATIENT)
Age: 59
End: 2024-01-27

## 2024-03-08 NOTE — PROGRESS NOTES
Assessment & Plan         Essential hypertension  - Comprehensive metabolic panel  - Lipid Profile (Chol, Trig, HDL, LDL calc)  - TSH with free T4 reflex  - losartan (COZAAR) 50 MG tablet; Take 1 tablet (50 mg) by mouth daily      Mixed hyperlipidemia  - Comprehensive metabolic panel  - Lipid Profile (Chol, Trig, HDL, LDL calc)  - TSH with free T4 reflex  - atorvastatin (LIPITOR) 20 MG tablet; Take 1 tablet (20 mg) by mouth daily      Anxiety  - Follow-up as needed      Chronic midline low back pain without sciatica  - Follow-up as needed      Impingement syndrome of left shoulder  - Orthopedic  Referral; Future      Primary insomnia  - Follow-up as needed      Screening for prostate cancer  - PSA, screen      No follow-ups on file.      Please continue to take all medication as directed  Please notify your pharmacy or our refill line of future refills needed.  Please return sooner than your next scheduled appointment with any concerns.      When your lab results return, we will call you with abnormal findings  You can also view this information in your MyChart, if you have an account.  Please call or [employer] message us with any questions you may have.          Laury Aikenmike [facility]  [phone]             Jesús is a 58 year old, presenting for the following health issues:  Chronic Disease Management          Hyperlipidemia Follow-Up  Are you regularly taking any medication or supplement to lower your cholesterol?   Yes- atorvastatin 20 mg daily  Are you having muscle aches or other side effects that you think could be caused by your cholesterol lowering medication?  No        Hypertension Follow-up  Do you check your blood pressure regularly outside of the clinic? No   Are you following a low salt diet? No  Are your blood pressures ever more than 140 on the top number (systolic) OR more   than 90 on the bottom number (diastolic), for example 140/90? No        Chronic/Recurring Back Pain Follow Up  Where is  your back pain located? (Select all that apply) low back bilateral  How would you describe your back pain?  dull ache  Where does your back pain spread? nowhere  Since your last clinic visit for back pain, how has your pain changed? unchanged  Does your back pain interfere with your job? No  Since your last visit, have you tried any new treatment? No        Left shoulder impingement syndrome  - Would like to see Ortho        GERD/Heartburn  Onset/Duration: prior  Accompanying Signs & Symptoms:  Does it feel like food gets stuck or trouble swallowing: No  Nausea: No  Vomiting (bloody?): No  Abdominal Pain: No  Black-Tarry stools: No  Bloody stools: No  History:  Previous ulcers: No  Therapies tried and outcome:             Medications: antacids        Anxiety   How are you doing with your anxiety since your last visit? Improved   Are you having other symptoms that might be associated with anxiety? No  Have you had a significant life event? No   Are you feeling depressed? No  Do you have any concerns with your use of alcohol or other drugs? No        Insomnia  - Stable, unchanged        Social History     Tobacco Use    Smoking status: Never    Smokeless tobacco: Never   Vaping Use    Vaping Use: Never used   Substance Use Topics    Alcohol use: Yes     Comment: occasional    Drug use: No             8/2/2022     3:00 PM 4/5/2023     3:26 PM 3/14/2024     3:50 PM   GIOVANY-7 SCORE   Total Score   0 (minimal anxiety)   Total Score 0 0 0             2/2/2022     1:49 PM 8/2/2022     3:00 PM 3/14/2024     3:49 PM   PHQ   PHQ-9 Total Score 0 2 2   Q9: Thoughts of better off dead/self-harm past 2 weeks Not at all Not at all Not at all             Review of Systems  Constitutional, HEENT, cardiovascular, pulmonary, GI, , musculoskeletal, neuro, skin, endocrine and psych systems are negative, except as otherwise noted.          Objective    /72 (BP Location: Left arm, Patient Position: Sitting, Cuff Size: Adult Large)    Pulse 62   Temp 97.7  F (36.5  C) (Tympanic)   Resp 18   Wt 102 kg (224 lb 14.4 oz)   SpO2 96%   BMI 32.27 kg/m    Body mass index is 32.27 kg/m .        Physical Exam   GENERAL: alert and no distress  EYES: Eyes grossly normal to inspection, PERRL and conjunctivae and sclerae normal  HENT: ear canals and TM's normal, nose and mouth without ulcers or lesions  NECK: no adenopathy, no asymmetry, masses, or scars  RESP: lungs clear to auscultation - no rales, rhonchi or wheezes  CV: regular rate and rhythm, normal S1 S2, no S3 or S4, no murmur, click or rub, no peripheral edema  MS: Left shoulder stiffness  SKIN: no suspicious lesions or rashes  PSYCH: mentation appears normal, affect normal/bright            The longitudinal plan of care for the diagnosis(es)/condition(s) as documented were addressed during this visit. Due to the added complexity in care, I will continue to support Jesús in the subsequent management and with ongoing continuity of care.           Signed Electronically by: Laury Trivedi, DEVANTE

## 2024-03-14 ASSESSMENT — ANXIETY QUESTIONNAIRES
4. TROUBLE RELAXING: NOT AT ALL
7. FEELING AFRAID AS IF SOMETHING AWFUL MIGHT HAPPEN: NOT AT ALL
6. BECOMING EASILY ANNOYED OR IRRITABLE: NOT AT ALL
1. FEELING NERVOUS, ANXIOUS, OR ON EDGE: NOT AT ALL
2. NOT BEING ABLE TO STOP OR CONTROL WORRYING: NOT AT ALL
3. WORRYING TOO MUCH ABOUT DIFFERENT THINGS: NOT AT ALL
GAD7 TOTAL SCORE: 0
GAD7 TOTAL SCORE: 0
5. BEING SO RESTLESS THAT IT IS HARD TO SIT STILL: NOT AT ALL
GAD7 TOTAL SCORE: 0
7. FEELING AFRAID AS IF SOMETHING AWFUL MIGHT HAPPEN: NOT AT ALL

## 2024-03-14 ASSESSMENT — PATIENT HEALTH QUESTIONNAIRE - PHQ9
SUM OF ALL RESPONSES TO PHQ QUESTIONS 1-9: 2
SUM OF ALL RESPONSES TO PHQ QUESTIONS 1-9: 2
10. IF YOU CHECKED OFF ANY PROBLEMS, HOW DIFFICULT HAVE THESE PROBLEMS MADE IT FOR YOU TO DO YOUR WORK, TAKE CARE OF THINGS AT HOME, OR GET ALONG WITH OTHER PEOPLE: NOT DIFFICULT AT ALL

## 2024-03-15 ENCOUNTER — OFFICE VISIT (OUTPATIENT)
Dept: FAMILY MEDICINE | Facility: OTHER | Age: 59
End: 2024-03-15
Attending: NURSE PRACTITIONER
Payer: COMMERCIAL

## 2024-03-15 VITALS
OXYGEN SATURATION: 96 % | SYSTOLIC BLOOD PRESSURE: 124 MMHG | WEIGHT: 224.9 LBS | TEMPERATURE: 97.7 F | RESPIRATION RATE: 18 BRPM | BODY MASS INDEX: 32.27 KG/M2 | HEART RATE: 62 BPM | DIASTOLIC BLOOD PRESSURE: 72 MMHG

## 2024-03-15 DIAGNOSIS — Z12.5 SCREENING FOR PROSTATE CANCER: ICD-10-CM

## 2024-03-15 DIAGNOSIS — E78.2 MIXED HYPERLIPIDEMIA: ICD-10-CM

## 2024-03-15 DIAGNOSIS — G89.29 CHRONIC MIDLINE LOW BACK PAIN WITHOUT SCIATICA: ICD-10-CM

## 2024-03-15 DIAGNOSIS — M75.42 IMPINGEMENT SYNDROME OF LEFT SHOULDER: ICD-10-CM

## 2024-03-15 DIAGNOSIS — F41.9 ANXIETY: Chronic | ICD-10-CM

## 2024-03-15 DIAGNOSIS — F51.01 PRIMARY INSOMNIA: ICD-10-CM

## 2024-03-15 DIAGNOSIS — M54.50 CHRONIC MIDLINE LOW BACK PAIN WITHOUT SCIATICA: ICD-10-CM

## 2024-03-15 DIAGNOSIS — I10 ESSENTIAL HYPERTENSION: Primary | ICD-10-CM

## 2024-03-15 LAB
ALBUMIN SERPL BCG-MCNC: 4.3 G/DL (ref 3.5–5.2)
ALP SERPL-CCNC: 66 U/L (ref 40–150)
ALT SERPL W P-5'-P-CCNC: 42 U/L (ref 0–70)
ANION GAP SERPL CALCULATED.3IONS-SCNC: 10 MMOL/L (ref 7–15)
AST SERPL W P-5'-P-CCNC: 47 U/L (ref 0–45)
BILIRUB SERPL-MCNC: 0.5 MG/DL
BUN SERPL-MCNC: 16.2 MG/DL (ref 8–23)
CALCIUM SERPL-MCNC: 9.2 MG/DL (ref 8.6–10)
CHLORIDE SERPL-SCNC: 106 MMOL/L (ref 98–107)
CHOLEST SERPL-MCNC: 134 MG/DL
CREAT SERPL-MCNC: 0.87 MG/DL (ref 0.67–1.17)
DEPRECATED HCO3 PLAS-SCNC: 24 MMOL/L (ref 22–29)
EGFRCR SERPLBLD CKD-EPI 2021: >90 ML/MIN/1.73M2
FASTING STATUS PATIENT QL REPORTED: YES
GLUCOSE SERPL-MCNC: 105 MG/DL (ref 70–99)
HDLC SERPL-MCNC: 48 MG/DL
HOLD SPECIMEN: NORMAL
LDLC SERPL CALC-MCNC: 58 MG/DL
NONHDLC SERPL-MCNC: 86 MG/DL
POTASSIUM SERPL-SCNC: 4.2 MMOL/L (ref 3.4–5.3)
PROT SERPL-MCNC: 6.9 G/DL (ref 6.4–8.3)
PSA SERPL DL<=0.01 NG/ML-MCNC: 2.96 NG/ML (ref 0–3.5)
SODIUM SERPL-SCNC: 140 MMOL/L (ref 135–145)
TRIGL SERPL-MCNC: 139 MG/DL
TSH SERPL DL<=0.005 MIU/L-ACNC: 2.65 UIU/ML (ref 0.3–4.2)

## 2024-03-15 PROCEDURE — 80053 COMPREHEN METABOLIC PANEL: CPT | Performed by: NURSE PRACTITIONER

## 2024-03-15 PROCEDURE — 36415 COLL VENOUS BLD VENIPUNCTURE: CPT | Performed by: NURSE PRACTITIONER

## 2024-03-15 PROCEDURE — 84443 ASSAY THYROID STIM HORMONE: CPT | Performed by: NURSE PRACTITIONER

## 2024-03-15 PROCEDURE — 99214 OFFICE O/P EST MOD 30 MIN: CPT | Performed by: NURSE PRACTITIONER

## 2024-03-15 PROCEDURE — G2211 COMPLEX E/M VISIT ADD ON: HCPCS | Performed by: NURSE PRACTITIONER

## 2024-03-15 PROCEDURE — G0103 PSA SCREENING: HCPCS | Performed by: NURSE PRACTITIONER

## 2024-03-15 PROCEDURE — 80061 LIPID PANEL: CPT | Performed by: NURSE PRACTITIONER

## 2024-03-15 RX ORDER — ATORVASTATIN CALCIUM 20 MG/1
20 TABLET, FILM COATED ORAL DAILY
Qty: 90 TABLET | Refills: 3 | Status: SHIPPED | OUTPATIENT
Start: 2024-03-15

## 2024-03-15 RX ORDER — LOSARTAN POTASSIUM 50 MG/1
50 TABLET ORAL DAILY
Qty: 90 TABLET | Refills: 3 | Status: SHIPPED | OUTPATIENT
Start: 2024-03-15

## 2024-03-15 ASSESSMENT — PAIN SCALES - GENERAL: PAINLEVEL: NO PAIN (0)

## 2024-03-15 NOTE — PATIENT INSTRUCTIONS
Assessment & Plan         Essential hypertension  - Comprehensive metabolic panel  - Lipid Profile (Chol, Trig, HDL, LDL calc)  - TSH with free T4 reflex  - losartan (COZAAR) 50 MG tablet; Take 1 tablet (50 mg) by mouth daily      Mixed hyperlipidemia  - Comprehensive metabolic panel  - Lipid Profile (Chol, Trig, HDL, LDL calc)  - TSH with free T4 reflex  - atorvastatin (LIPITOR) 20 MG tablet; Take 1 tablet (20 mg) by mouth daily      Anxiety  - Follow-up as needed      Chronic midline low back pain without sciatica  - Follow-up as needed      Impingement syndrome of left shoulder  - Orthopedic  Referral; Future      Primary insomnia  - Follow-up as needed      Screening for prostate cancer  - PSA, screen      No follow-ups on file.      Please continue to take all medication as directed  Please notify your pharmacy or our refill line of future refills needed.  Please return sooner than your next scheduled appointment with any concerns.      When your lab results return, we will call you with abnormal findings  You can also view this information in your MyChart, if you have an account.  Please call or [email or web address] message us with any questions you may have.          Laury RICHARDS  [phone]

## 2024-09-17 NOTE — PROGRESS NOTES
Assessment & Plan         Essential hypertension  - Comprehensive metabolic panel; Future  - Lipid Profile (Chol, Trig, HDL, LDL calc); Future  - TSH with free T4 reflex; Future  - Add OTC aspirin daily as discussed      Mixed hyperlipidemia  - Comprehensive metabolic panel; Future  - Lipid Profile (Chol, Trig, HDL, LDL calc); Future  - TSH with free T4 reflex; Future  - Add OTC aspirin daily as discussed      Anxiety  - Follow-up as needed      Fatigue, unspecified type  - CBC with platelets and differential; Future  - Multiple vitamin daily      Multiple joint pain  - LYME DISEASE TOTAL ANTIBODIES WITH REFLEX TO CONFIRMATION; Future        All chronic conditions are stable at this time  Please continue to take all medication as directed  Please notify your pharmacy or our refill line of future refills needed.  Please return sooner than your next scheduled appointment with any concerns.      When your lab results return, we will call you with abnormal findings  You can also view this information in your MyChart, if you have an account.  Please call or [employer] message us with any questions you may have.          Return in about 6 months (around 3/18/2025), or Physical.        Laury Aikenkareenlonnie [facility]  [phone]           Jesús is a 59 year old, presenting for the following health issues:  Hypertension, Lipids, and Musculoskeletal Problem        Hyperlipidemia Follow-Up  Are you regularly taking any medication or supplement to lower your cholesterol?   Yes- atorvastatin 20 mg daily  Are you having muscle aches or other side effects that you think could be caused by your cholesterol lowering medication?  No        Hypertension Follow-up  Do you check your blood pressure regularly outside of the clinic? No   Are you following a low salt diet? No  Are your blood pressures ever more than 140 on the top number (systolic) OR more   than 90 on the bottom number (diastolic), for example 140/90? No        Chronic/Recurring Back  Pain Follow Up  Where is your back pain located? (Select all that apply) low back midline  How would you describe your back pain?  dull ache  Where does your back pain spread? nowhere  Since your last clinic visit for back pain, how has your pain changed? always present, but gets better and worse  Does your back pain interfere with your job? No  Since your last visit, have you tried any new treatment? No        Anxiety   How are you doing with your anxiety since your last visit? No change  Are you having other symptoms that might be associated with anxiety? No  Have you had a significant life event? No   Are you feeling depressed? No  Do you have any concerns with your use of alcohol or other drugs? No        Joint pain  Diffuse, daily  Is still active but notes a lot of joitn soreness  Outdoors a lot, questions a possible tick bite        Fatigue  Daily  Is not remembering to take his MVI  Will add to his pill box        Social History     Tobacco Use    Smoking status: Never    Smokeless tobacco: Never   Vaping Use    Vaping status: Never Used   Substance Use Topics    Alcohol use: Yes     Comment: occasional    Drug use: No             4/5/2023     3:26 PM 3/14/2024     3:50 PM 9/18/2024     8:14 AM   GIOVANY-7 SCORE   Total Score  0 (minimal anxiety) 4 (minimal anxiety)   Total Score 0 0 4             2/2/2022     1:49 PM 8/2/2022     3:00 PM 3/14/2024     3:49 PM   PHQ   PHQ-9 Total Score 0 2 2   Q9: Thoughts of better off dead/self-harm past 2 weeks Not at all Not at all Not at all         Patient Active Problem List   Diagnosis    Anxiety    Chronic midline low back pain without sciatica    Primary insomnia    Vitamin D deficiency    Observed sleep apnea    Sacroiliac joint dysfunction    History of colonic polyps    Impingement syndrome of left shoulder    History of COVID-19    Essential hypertension    Hyperlipidemia LDL goal <100     Past Surgical History:   Procedure Laterality Date    AS COLONOSCOPY W BX  FORCEP/CAUTERY REMOVAL KENYA/POLYP/LESION N/A 01/01/1999    stomache pain/family hx    COLONOSCOPY  01/01/2011    f/u polyectomy/ family HX crohns    COLONOSCOPY N/A 12/20/2018    Procedure: COLONOSCOPY with Polypectomy;  Surgeon: Yared Vilchis MD;  Location: [facility] OR    ORTHOPEDIC SURGERY      shoulder surgery    RECESSION RESECTION (REPAIR STRABISMUS) BILATERAL  10/18/2011    Procedure:RECESSION RESECTION (REPAIR STRABISMUS) BILATERAL; Strabismus Repair Right; Surgeon:MERCEDEZ CHAUHAN; Location: OR       Social History     Tobacco Use    Smoking status: Never    Smokeless tobacco: Never   Substance Use Topics    Alcohol use: Yes     Comment: occasional     Family History   Problem Relation Age of Onset    Cancer Mother         Brain - Cause of Death    Crohn's Disease Father         Cause of Death    Crohn's Disease Sister              Current Outpatient Medications   Medication Sig Dispense Refill    ACETAMINOPHEN EXTRA STRENGTH 500 MG tablet       atorvastatin (LIPITOR) 20 MG tablet Take 1 tablet (20 mg) by mouth daily 90 tablet 3    losartan (COZAAR) 50 MG tablet Take 1 tablet (50 mg) by mouth daily 90 tablet 3         Allergies   Allergen Reactions    No Clinical Screening - See Comments      Seasonal allergies         Recent Labs   Lab Test 03/15/24  0929 09/29/22  1350 08/02/22  1538 02/02/22  1419 08/04/21  1412 07/09/21  1356 05/17/21  1609   A1C  --  5.6  --   --   --   --   --    LDL 58  --  64 60  --  93  --    HDL 48  --  47 49  --  39*  --    TRIG 139  --  112 151*  --  277*  --    ALT 42  --  48 60   < > 54 47   CR 0.87  --  0.99 1.03   < > 0.95 0.99   GFRESTIMATED >90  --  88 85   < > 88 84   GFRESTBLACK  --   --   --   --   --  >90 >90   POTASSIUM 4.2  --  4.0 3.5   < > 3.7 3.7   TSH 2.65  --   --  2.31  --   --   --     < > = values in this interval not displayed.          BP Readings from Last 3 Encounters:   09/18/24 120/72   03/15/24 124/72   04/17/23 118/79    Wt Readings from Last 3  Encounters:   09/18/24 94.4 kg (208 lb 3.2 oz)   03/15/24 102 kg (224 lb 14.4 oz)   04/05/23 101.7 kg (224 lb 1.6 oz)                 Review of Systems  Constitutional, HEENT, cardiovascular, pulmonary, GI, , musculoskeletal, neuro, skin, endocrine and psych systems are negative, except as otherwise noted.            Objective    /72 (BP Location: Left arm, Patient Position: Sitting, Cuff Size: Adult Regular)   Pulse 69   Temp 97.4  F (36.3  C) (Tympanic)   Resp 18   Wt 94.4 kg (208 lb 3.2 oz)   SpO2 96%   BMI 29.87 kg/m    Body mass index is 29.87 kg/m .          Physical Exam   GENERAL: alert and no distress  EYES: Eyes grossly normal to inspection, PERRL and conjunctivae and sclerae normal  HENT: ear canals and TM's normal, nose and mouth without ulcers or lesions  NECK: no adenopathy, no asymmetry, masses, or scars  RESP: lungs clear to auscultation - no rales, rhonchi or wheezes  CV: regular rate and rhythm, normal S1 S2, no S3 or S4, no murmur, click or rub, no peripheral edema  MS: no gross musculoskeletal defects noted, no edema  SKIN: no suspicious lesions or rashes  PSYCH: mentation appears normal, affect normal/bright          The longitudinal plan of care for the diagnosis(es)/condition(s) as documented were addressed during this visit. Due to the added complexity in care, I will continue to support Jesús in the subsequent management and with ongoing continuity of care.             Signed Electronically by: Laury Trivedi CNP

## 2024-09-18 ENCOUNTER — OFFICE VISIT (OUTPATIENT)
Dept: FAMILY MEDICINE | Facility: OTHER | Age: 59
End: 2024-09-18
Attending: NURSE PRACTITIONER
Payer: COMMERCIAL

## 2024-09-18 VITALS
TEMPERATURE: 97.4 F | OXYGEN SATURATION: 96 % | DIASTOLIC BLOOD PRESSURE: 72 MMHG | HEART RATE: 69 BPM | SYSTOLIC BLOOD PRESSURE: 120 MMHG | RESPIRATION RATE: 18 BRPM | WEIGHT: 208.2 LBS | BODY MASS INDEX: 29.87 KG/M2

## 2024-09-18 DIAGNOSIS — E78.2 MIXED HYPERLIPIDEMIA: Primary | ICD-10-CM

## 2024-09-18 DIAGNOSIS — M25.50 MULTIPLE JOINT PAIN: ICD-10-CM

## 2024-09-18 DIAGNOSIS — I10 ESSENTIAL HYPERTENSION: ICD-10-CM

## 2024-09-18 DIAGNOSIS — R53.83 FATIGUE, UNSPECIFIED TYPE: ICD-10-CM

## 2024-09-18 DIAGNOSIS — F41.9 ANXIETY: Chronic | ICD-10-CM

## 2024-09-18 LAB
ALBUMIN SERPL BCG-MCNC: 4.6 G/DL (ref 3.5–5.2)
ALP SERPL-CCNC: 71 U/L (ref 40–150)
ALT SERPL W P-5'-P-CCNC: 33 U/L (ref 0–70)
ANION GAP SERPL CALCULATED.3IONS-SCNC: 14 MMOL/L (ref 7–15)
AST SERPL W P-5'-P-CCNC: 39 U/L (ref 0–45)
B BURGDOR IGG+IGM SER QL: 0.06
BASOPHILS # BLD AUTO: 0 10E3/UL (ref 0–0.2)
BASOPHILS NFR BLD AUTO: 0 %
BILIRUB SERPL-MCNC: 0.9 MG/DL
BUN SERPL-MCNC: 19 MG/DL (ref 8–23)
CALCIUM SERPL-MCNC: 9.7 MG/DL (ref 8.8–10.4)
CHLORIDE SERPL-SCNC: 105 MMOL/L (ref 98–107)
CHOLEST SERPL-MCNC: 135 MG/DL
CREAT SERPL-MCNC: 0.95 MG/DL (ref 0.67–1.17)
EGFRCR SERPLBLD CKD-EPI 2021: >90 ML/MIN/1.73M2
EOSINOPHIL # BLD AUTO: 0.1 10E3/UL (ref 0–0.7)
EOSINOPHIL NFR BLD AUTO: 2 %
ERYTHROCYTE [DISTWIDTH] IN BLOOD BY AUTOMATED COUNT: 14.3 % (ref 10–15)
FASTING STATUS PATIENT QL REPORTED: YES
FASTING STATUS PATIENT QL REPORTED: YES
GLUCOSE SERPL-MCNC: 102 MG/DL (ref 70–99)
HCO3 SERPL-SCNC: 21 MMOL/L (ref 22–29)
HCT VFR BLD AUTO: 37.4 % (ref 40–53)
HDLC SERPL-MCNC: 46 MG/DL
HGB BLD-MCNC: 12.8 G/DL (ref 13.3–17.7)
IMM GRANULOCYTES # BLD: 0 10E3/UL
IMM GRANULOCYTES NFR BLD: 0 %
LDLC SERPL CALC-MCNC: 65 MG/DL
LYMPHOCYTES # BLD AUTO: 1 10E3/UL (ref 0.8–5.3)
LYMPHOCYTES NFR BLD AUTO: 19 %
MCH RBC QN AUTO: 28.3 PG (ref 26.5–33)
MCHC RBC AUTO-ENTMCNC: 34.2 G/DL (ref 31.5–36.5)
MCV RBC AUTO: 83 FL (ref 78–100)
MONOCYTES # BLD AUTO: 0.6 10E3/UL (ref 0–1.3)
MONOCYTES NFR BLD AUTO: 11 %
NEUTROPHILS # BLD AUTO: 3.4 10E3/UL (ref 1.6–8.3)
NEUTROPHILS NFR BLD AUTO: 68 %
NONHDLC SERPL-MCNC: 89 MG/DL
PLATELET # BLD AUTO: 137 10E3/UL (ref 150–450)
POTASSIUM SERPL-SCNC: 3.8 MMOL/L (ref 3.4–5.3)
PROT SERPL-MCNC: 6.9 G/DL (ref 6.4–8.3)
RBC # BLD AUTO: 4.53 10E6/UL (ref 4.4–5.9)
SODIUM SERPL-SCNC: 140 MMOL/L (ref 135–145)
TRIGL SERPL-MCNC: 120 MG/DL
TSH SERPL DL<=0.005 MIU/L-ACNC: 3.38 UIU/ML (ref 0.3–4.2)
WBC # BLD AUTO: 5.1 10E3/UL (ref 4–11)

## 2024-09-18 PROCEDURE — 36415 COLL VENOUS BLD VENIPUNCTURE: CPT | Performed by: NURSE PRACTITIONER

## 2024-09-18 PROCEDURE — 80050 GENERAL HEALTH PANEL: CPT | Performed by: NURSE PRACTITIONER

## 2024-09-18 PROCEDURE — G2211 COMPLEX E/M VISIT ADD ON: HCPCS | Performed by: NURSE PRACTITIONER

## 2024-09-18 PROCEDURE — 86618 LYME DISEASE ANTIBODY: CPT | Performed by: NURSE PRACTITIONER

## 2024-09-18 PROCEDURE — 99214 OFFICE O/P EST MOD 30 MIN: CPT | Performed by: NURSE PRACTITIONER

## 2024-09-18 PROCEDURE — 80061 LIPID PANEL: CPT | Performed by: NURSE PRACTITIONER

## 2024-09-18 RX ORDER — ASPIRIN 81 MG/1
81 TABLET ORAL DAILY
COMMUNITY
Start: 2024-09-18

## 2024-09-18 ASSESSMENT — PAIN SCALES - GENERAL: PAINLEVEL: MILD PAIN (3)

## 2024-09-18 ASSESSMENT — ANXIETY QUESTIONNAIRES
7. FEELING AFRAID AS IF SOMETHING AWFUL MIGHT HAPPEN: NOT AT ALL
GAD7 TOTAL SCORE: 4
8. IF YOU CHECKED OFF ANY PROBLEMS, HOW DIFFICULT HAVE THESE MADE IT FOR YOU TO DO YOUR WORK, TAKE CARE OF THINGS AT HOME, OR GET ALONG WITH OTHER PEOPLE?: NOT DIFFICULT AT ALL
GAD7 TOTAL SCORE: 4
GAD7 TOTAL SCORE: 4

## 2024-09-18 NOTE — PATIENT INSTRUCTIONS
Assessment & Plan         Essential hypertension  - Comprehensive metabolic panel; Future  - Lipid Profile (Chol, Trig, HDL, LDL calc); Future  - TSH with free T4 reflex; Future  - Add OTC aspirin daily as discussed      Mixed hyperlipidemia  - Comprehensive metabolic panel; Future  - Lipid Profile (Chol, Trig, HDL, LDL calc); Future  - TSH with free T4 reflex; Future  - Add OTC aspirin daily as discussed      Anxiety  - Follow-up as needed      Fatigue, unspecified type  - CBC with platelets and differential; Future  - Multiple vitamin daily      Multiple joint pain  - LYME DISEASE TOTAL ANTIBODIES WITH REFLEX TO CONFIRMATION; Future        All chronic conditions are stable at this time  Please continue to take all medication as directed  Please notify your pharmacy or our refill line of future refills needed.  Please return sooner than your next scheduled appointment with any concerns.      When your lab results return, we will call you with abnormal findings  You can also view this information in your MyChart, if you have an account.  Please call or [email or web address] message us with any questions you may have.          Return in about 6 months (around 3/18/2025), or Physical.        Laury RICHARDS  [phone]

## 2024-10-14 ENCOUNTER — MYC MEDICAL ADVICE (OUTPATIENT)
Dept: FAMILY MEDICINE | Facility: OTHER | Age: 59
End: 2024-10-14

## 2024-10-14 NOTE — TELEPHONE ENCOUNTER
Technically he hasn't had a physical - so if the form says it requires a physical, yes he would need to schedule one.  The form is pretty specific about it being an annual wellness physical.    Laury SCOTTCHINMAY  [phone]

## 2024-10-14 NOTE — TELEPHONE ENCOUNTER
Would you sign form and consider 3/15 appointment as preventative or does he need to schedule actual physical?

## 2025-03-18 SDOH — HEALTH STABILITY: PHYSICAL HEALTH: ON AVERAGE, HOW MANY MINUTES DO YOU ENGAGE IN EXERCISE AT THIS LEVEL?: 30 MIN

## 2025-03-18 SDOH — HEALTH STABILITY: PHYSICAL HEALTH: ON AVERAGE, HOW MANY DAYS PER WEEK DO YOU ENGAGE IN MODERATE TO STRENUOUS EXERCISE (LIKE A BRISK WALK)?: 6 DAYS

## 2025-03-18 ASSESSMENT — PATIENT HEALTH QUESTIONNAIRE - PHQ9
SUM OF ALL RESPONSES TO PHQ QUESTIONS 1-9: 3
SUM OF ALL RESPONSES TO PHQ QUESTIONS 1-9: 3
10. IF YOU CHECKED OFF ANY PROBLEMS, HOW DIFFICULT HAVE THESE PROBLEMS MADE IT FOR YOU TO DO YOUR WORK, TAKE CARE OF THINGS AT HOME, OR GET ALONG WITH OTHER PEOPLE: NOT DIFFICULT AT ALL

## 2025-03-18 ASSESSMENT — SOCIAL DETERMINANTS OF HEALTH (SDOH): HOW OFTEN DO YOU GET TOGETHER WITH FRIENDS OR RELATIVES?: ONCE A WEEK

## 2025-03-19 ENCOUNTER — OFFICE VISIT (OUTPATIENT)
Dept: FAMILY MEDICINE | Facility: OTHER | Age: 60
End: 2025-03-19
Attending: NURSE PRACTITIONER
Payer: COMMERCIAL

## 2025-03-19 VITALS
HEIGHT: 69 IN | SYSTOLIC BLOOD PRESSURE: 118 MMHG | RESPIRATION RATE: 20 BRPM | TEMPERATURE: 97.5 F | OXYGEN SATURATION: 94 % | HEART RATE: 69 BPM | WEIGHT: 214.9 LBS | DIASTOLIC BLOOD PRESSURE: 68 MMHG | BODY MASS INDEX: 31.83 KG/M2

## 2025-03-19 DIAGNOSIS — Z12.5 SCREENING FOR PROSTATE CANCER: ICD-10-CM

## 2025-03-19 DIAGNOSIS — G89.29 CHRONIC MIDLINE LOW BACK PAIN WITHOUT SCIATICA: ICD-10-CM

## 2025-03-19 DIAGNOSIS — Z00.00 ROUTINE GENERAL MEDICAL EXAMINATION AT A HEALTH CARE FACILITY: Primary | ICD-10-CM

## 2025-03-19 DIAGNOSIS — M54.50 CHRONIC MIDLINE LOW BACK PAIN WITHOUT SCIATICA: ICD-10-CM

## 2025-03-19 DIAGNOSIS — F41.9 ANXIETY: Chronic | ICD-10-CM

## 2025-03-19 DIAGNOSIS — I10 ESSENTIAL HYPERTENSION: ICD-10-CM

## 2025-03-19 DIAGNOSIS — E78.5 HYPERLIPIDEMIA LDL GOAL <100: ICD-10-CM

## 2025-03-19 LAB
ALBUMIN SERPL BCG-MCNC: 4.8 G/DL (ref 3.5–5.2)
ALBUMIN UR-MCNC: NEGATIVE MG/DL
ALP SERPL-CCNC: 64 U/L (ref 40–150)
ALT SERPL W P-5'-P-CCNC: 38 U/L (ref 0–70)
ANION GAP SERPL CALCULATED.3IONS-SCNC: 11 MMOL/L (ref 7–15)
APPEARANCE UR: CLEAR
AST SERPL W P-5'-P-CCNC: 38 U/L (ref 0–45)
BASOPHILS # BLD AUTO: 0 10E3/UL (ref 0–0.2)
BASOPHILS NFR BLD AUTO: 0 %
BILIRUB SERPL-MCNC: 0.7 MG/DL
BILIRUB UR QL STRIP: NEGATIVE
BUN SERPL-MCNC: 23.6 MG/DL (ref 8–23)
CALCIUM SERPL-MCNC: 9.4 MG/DL (ref 8.8–10.4)
CHLORIDE SERPL-SCNC: 106 MMOL/L (ref 98–107)
CHOLEST SERPL-MCNC: 134 MG/DL
COLOR UR AUTO: YELLOW
CREAT SERPL-MCNC: 0.94 MG/DL (ref 0.67–1.17)
EGFRCR SERPLBLD CKD-EPI 2021: >90 ML/MIN/1.73M2
EOSINOPHIL # BLD AUTO: 0.1 10E3/UL (ref 0–0.7)
EOSINOPHIL NFR BLD AUTO: 2 %
ERYTHROCYTE [DISTWIDTH] IN BLOOD BY AUTOMATED COUNT: 14.2 % (ref 10–15)
FASTING STATUS PATIENT QL REPORTED: YES
FASTING STATUS PATIENT QL REPORTED: YES
GLUCOSE SERPL-MCNC: 103 MG/DL (ref 70–99)
GLUCOSE UR STRIP-MCNC: NEGATIVE MG/DL
HCO3 SERPL-SCNC: 23 MMOL/L (ref 22–29)
HCT VFR BLD AUTO: 41.6 % (ref 40–53)
HDLC SERPL-MCNC: 49 MG/DL
HGB BLD-MCNC: 14.1 G/DL (ref 13.3–17.7)
HGB UR QL STRIP: NEGATIVE
IMM GRANULOCYTES # BLD: 0 10E3/UL
IMM GRANULOCYTES NFR BLD: 0 %
KETONES UR STRIP-MCNC: NEGATIVE MG/DL
LDLC SERPL CALC-MCNC: 60 MG/DL
LEUKOCYTE ESTERASE UR QL STRIP: NEGATIVE
LYMPHOCYTES # BLD AUTO: 1.3 10E3/UL (ref 0.8–5.3)
LYMPHOCYTES NFR BLD AUTO: 24 %
MCH RBC QN AUTO: 28 PG (ref 26.5–33)
MCHC RBC AUTO-ENTMCNC: 33.9 G/DL (ref 31.5–36.5)
MCV RBC AUTO: 83 FL (ref 78–100)
MONOCYTES # BLD AUTO: 0.5 10E3/UL (ref 0–1.3)
MONOCYTES NFR BLD AUTO: 9 %
NEUTROPHILS # BLD AUTO: 3.5 10E3/UL (ref 1.6–8.3)
NEUTROPHILS NFR BLD AUTO: 64 %
NITRATE UR QL: NEGATIVE
NONHDLC SERPL-MCNC: 85 MG/DL
PH UR STRIP: 6 [PH] (ref 5–7)
PLATELET # BLD AUTO: 141 10E3/UL (ref 150–450)
POTASSIUM SERPL-SCNC: 4.1 MMOL/L (ref 3.4–5.3)
PROT SERPL-MCNC: 7.3 G/DL (ref 6.4–8.3)
PSA SERPL DL<=0.01 NG/ML-MCNC: 3.06 NG/ML (ref 0–4.5)
RBC # BLD AUTO: 5.04 10E6/UL (ref 4.4–5.9)
SODIUM SERPL-SCNC: 140 MMOL/L (ref 135–145)
SP GR UR STRIP: 1.02 (ref 1–1.03)
TRIGL SERPL-MCNC: 125 MG/DL
TSH SERPL DL<=0.005 MIU/L-ACNC: 1.89 UIU/ML (ref 0.3–4.2)
UROBILINOGEN UR STRIP-ACNC: 0.2 E.U./DL
WBC # BLD AUTO: 5.5 10E3/UL (ref 4–11)

## 2025-03-19 RX ORDER — ATORVASTATIN CALCIUM 20 MG/1
20 TABLET, FILM COATED ORAL DAILY
Qty: 90 TABLET | Refills: 3 | Status: SHIPPED | OUTPATIENT
Start: 2025-03-19

## 2025-03-19 RX ORDER — LOSARTAN POTASSIUM 50 MG/1
50 TABLET ORAL DAILY
Qty: 90 TABLET | Refills: 3 | Status: SHIPPED | OUTPATIENT
Start: 2025-03-19

## 2025-03-19 ASSESSMENT — PAIN SCALES - GENERAL: PAINLEVEL_OUTOF10: NO PAIN (0)

## 2025-03-19 ASSESSMENT — ANXIETY QUESTIONNAIRES
GAD7 TOTAL SCORE: 0
1. FEELING NERVOUS, ANXIOUS, OR ON EDGE: NOT AT ALL
7. FEELING AFRAID AS IF SOMETHING AWFUL MIGHT HAPPEN: NOT AT ALL
7. FEELING AFRAID AS IF SOMETHING AWFUL MIGHT HAPPEN: NOT AT ALL
3. WORRYING TOO MUCH ABOUT DIFFERENT THINGS: NOT AT ALL
5. BEING SO RESTLESS THAT IT IS HARD TO SIT STILL: NOT AT ALL
GAD7 TOTAL SCORE: 0
6. BECOMING EASILY ANNOYED OR IRRITABLE: NOT AT ALL
IF YOU CHECKED OFF ANY PROBLEMS ON THIS QUESTIONNAIRE, HOW DIFFICULT HAVE THESE PROBLEMS MADE IT FOR YOU TO DO YOUR WORK, TAKE CARE OF THINGS AT HOME, OR GET ALONG WITH OTHER PEOPLE: NOT DIFFICULT AT ALL
GAD7 TOTAL SCORE: 0
4. TROUBLE RELAXING: NOT AT ALL
8. IF YOU CHECKED OFF ANY PROBLEMS, HOW DIFFICULT HAVE THESE MADE IT FOR YOU TO DO YOUR WORK, TAKE CARE OF THINGS AT HOME, OR GET ALONG WITH OTHER PEOPLE?: NOT DIFFICULT AT ALL
2. NOT BEING ABLE TO STOP OR CONTROL WORRYING: NOT AT ALL

## 2025-03-19 NOTE — PROGRESS NOTES
Preventive Care Visit  RANGE MT JÚNIOR Gomezlonnie, CNP, Family Medicine        Mar 19, 2025            Assessment & Plan       Routine general medical examination at a health care facility  - CBC with platelets and differential      Essential hypertension - stable  - Low saturated fat diet  - Comprehensive metabolic panel  - Lipid Profile (Chol, Trig, HDL, LDL calc)  - TSH with free T4 reflex  - UA Macroscopic with reflex to Microscopic and Culture  - losartan (COZAAR) 50 MG tablet; Take 1 tablet (50 mg) by mouth daily.      Hyperlipidemia LDL goal <100 - stable  - Low saturated fat diet  - Comprehensive metabolic panel  - Lipid Profile (Chol, Trig, HDL, LDL calc)  - atorvastatin (LIPITOR) 20 MG tablet; Take 1 tablet (20 mg) by mouth daily.      Chronic midline low back pain without sciatica - stable  - Follow-up as needed      Anxiety - stable  - Follow-up as needed      Screening for prostate cancer  - PSA, screen        Patient has been advised of split billing requirements and indicates understanding: Yes            Return in about 6 months (around 9/19/2025).        All chronic conditions are stable at this time  Please continue to take all medication as directed  Please notify your pharmacy or our refill line of future refills needed.  Please return sooner than your next scheduled appointment with any concerns.        When your lab results return, we will call you with abnormal findings  You can also view this information in your MyChart, if you have an account.  Please call or [employer] message us with any questions you may have.          Laury Trivedi -[facility]  [phone]           Jesús is a 59 year old, presenting for the following:  Physical           Hyperlipidemia Follow-Up  Are you regularly taking any medication or supplement to lower your cholesterol?   Yes- atorvastatin 20 mg daily  Are you having muscle aches or other side effects that you think could be caused by your cholesterol lowering medication?   No        Hypertension Follow-up  Do you check your blood pressure regularly outside of the clinic? No   Are you following a low salt diet? No  Are your blood pressures ever more than 140 on the top number (systolic) OR more   than 90 on the bottom number (diastolic), for example 140/90? No        Anxiety   How are you doing with your anxiety since your last visit? Improved   Are you having other symptoms that might be associated with anxiety? No  Have you had a significant life event? No   Are you feeling depressed? No  Do you have any concerns with your use of alcohol or other drugs? No        Social History     Tobacco Use    Smoking status: Never    Smokeless tobacco: Never   Vaping Use    Vaping status: Never Used   Substance Use Topics    Alcohol use: Yes     Comment: occasional    Drug use: No             3/14/2024     3:50 PM 9/18/2024     8:14 AM 3/19/2025     8:15 AM   GIOVANY-7 SCORE   Total Score 0 (minimal anxiety) 4 (minimal anxiety) 0 (minimal anxiety)   Total Score 0 4 0        Patient-reported             8/2/2022     3:00 PM 3/14/2024     3:49 PM 3/18/2025     7:56 PM   PHQ   PHQ-9 Total Score 2 2 3    Q9: Thoughts of better off dead/self-harm past 2 weeks Not at all Not at all Not at all       Patient-reported             3/18/2025     7:56 PM   Last PHQ-9   1.  Little interest or pleasure in doing things 0   2.  Feeling down, depressed, or hopeless 0   3.  Trouble falling or staying asleep, or sleeping too much 1   4.  Feeling tired or having little energy 1   5.  Poor appetite or overeating 0   6.  Feeling bad about yourself 0   7.  Trouble concentrating 1   8.  Moving slowly or restless 0   Q9: Thoughts of better off dead/self-harm past 2 weeks 0   PHQ-9 Total Score 3        Patient-reported           3/19/2025     8:15 AM   GIOVANY-7    1. Feeling nervous, anxious, or on edge 0   2. Not being able to stop or control worrying 0   3. Worrying too much about different things 0   4. Trouble relaxing 0    5. Being so restless that it is hard to sit still 0   6. Becoming easily annoyed or irritable 0   7. Feeling afraid, as if something awful might happen 0   GIOVANY-7 Total Score 0    If you checked any problems, how difficult have they made it for you to do your work, take care of things at home, or get along with other people? Not difficult at all       Patient-reported         Chronic/Recurring Back Pain Follow Up  Where is your back pain located? (Select all that apply) low back midline  How would you describe your back pain?  dull ache  Where does your back pain spread? nowhere  Since your last clinic visit for back pain, how has your pain changed? gradually improving  Does your back pain interfere with your job? No  Since your last visit, have you tried any new treatment? No          Advance Care Planning  Patient does not have a Health Care Directive: Discussed advance care planning with patient; however, patient declined at this time.            3/18/2025   General Health   How would you rate your overall physical health? Good   Feel stress (tense, anxious, or unable to sleep) To some extent   (!) STRESS CONCERN      3/18/2025   Nutrition   Three or more servings of calcium each day? Yes   Diet: Regular (no restrictions)   How many servings of fruit and vegetables per day? (!) 0-1   How many sweetened beverages each day? 0-1         3/18/2025   Exercise   Days per week of moderate/strenous exercise 6 days   Average minutes spent exercising at this level 30 min             3/18/2025   Social Factors   Frequency of gathering with friends or relatives Once a week   Worry food won't last until get money to buy more No   Food not last or not have enough money for food? No   Do you have housing? (Housing is defined as stable permanent housing and does not include staying ouside in a car, in a tent, in an abandoned building, in an overnight shelter, or couch-surfing.) Yes   Are you worried about losing your housing?  No   Lack of transportation? No   Unable to get utilities (heat,electricity)? No             3/18/2025   Fall Risk   Fallen 2 or more times in the past year? No   Trouble with walking or balance? No          3/18/2025   Dental   Dentist two times every year? Yes         Today's PHQ-9 Score:       3/18/2025     7:56 PM   PHQ-9 SCORE   PHQ-9 Total Score MyChart 3 (Minimal depression)   PHQ-9 Total Score 3        Patient-reported             3/18/2025   Substance Use   Alcohol more than 3/day or more than 7/wk No   Do you use any other substances recreationally? No         Social History     Tobacco Use    Smoking status: Never    Smokeless tobacco: Never   Vaping Use    Vaping status: Never Used   Substance Use Topics    Alcohol use: Yes     Comment: occasional    Drug use: No             3/18/2025   STI Screening   New sexual partner(s) since last STI/HIV test? No   Last PSA:   PSA   Date Value Ref Range Status   07/09/2021 2.51 0 - 4 ug/L Final     Comment:     Assay Method:  Chemiluminescence using Siemens Vista analyzer     Prostate Specific Antigen Screen   Date Value Ref Range Status   03/15/2024 2.96 0.00 - 3.50 ng/mL Final   08/02/2022 4.00 0.00 - 4.00 ug/L Final           ASCVD Risk   The 10-year ASCVD risk score (Abby HERRERA, et al., 2019) is: 6.5%    Values used to calculate the score:      Age: 59 years      Sex: Male      Is Non- : No      Diabetic: No      Tobacco smoker: No      Systolic Blood Pressure: 118 mmHg      Is BP treated: Yes      HDL Cholesterol: 46 mg/dL      Total Cholesterol: 135 mg/dL                          Past Medical History:   Diagnosis Date    Depressive disorder     History of COVID-19 7/9/2021    Hyperlipidemia LDL goal <100 2/2/2022    TGA (transient global amnesia) 01/04/2018    admitted with a 6 hour episode of confusion and anterograde and retrograde amnesia. CT head, CTA head and neck, MRI brain and EEG were negative. Neurology was consulted  and felt this to have been an episode of transient global amnesia that can be followed by his PCP with referral to neurology if recurs.          Past Surgical History:   Procedure Laterality Date    AS COLONOSCOPY W BX FORCEP/CAUTERY REMOVAL KENYA/POLYP/LESION N/A 01/01/1999    stomache pain/family hx    COLONOSCOPY  01/01/2011    f/u polyectomy/ family HX crohns    COLONOSCOPY N/A 12/20/2018    Procedure: COLONOSCOPY with Polypectomy;  Surgeon: Yared Vilchis MD;  Location: [facility]    ORTHOPEDIC SURGERY      shoulder surgery    RECESSION RESECTION (REPAIR STRABISMUS) BILATERAL  10/18/2011    Procedure:RECESSION RESECTION (REPAIR STRABISMUS) BILATERAL; Strabismus Repair Right; Surgeon:MERCEDEZ CHAUHAN; Location:[facility]           Lab work is in process  Labs reviewed in EPIC  BP Readings from Last 3 Encounters:   03/19/25 118/68   09/18/24 120/72   03/15/24 124/72    Wt Readings from Last 3 Encounters:   03/19/25 97.5 kg (214 lb 14.4 oz)   09/18/24 94.4 kg (208 lb 3.2 oz)   03/15/24 102 kg (224 lb 14.4 oz)                  Patient Active Problem List   Diagnosis    Anxiety    Chronic midline low back pain without sciatica    Primary insomnia    Vitamin D deficiency    Observed sleep apnea    Sacroiliac joint dysfunction    History of colonic polyps    Impingement syndrome of left shoulder    History of COVID-19    Essential hypertension    Hyperlipidemia LDL goal <100     Past Surgical History:   Procedure Laterality Date    AS COLONOSCOPY W BX FORCEP/CAUTERY REMOVAL KENYA/POLYP/LESION N/A 01/01/1999    stomache pain/family hx    COLONOSCOPY  01/01/2011    f/u polyectomy/ family HX crohns    COLONOSCOPY N/A 12/20/2018    Procedure: COLONOSCOPY with Polypectomy;  Surgeon: Yared Vilchis MD;  Location: [facility]    ORTHOPEDIC SURGERY      shoulder surgery    RECESSION RESECTION (REPAIR STRABISMUS) BILATERAL  10/18/2011    Procedure:RECESSION RESECTION (REPAIR STRABISMUS) BILATERAL; Strabismus Repair Right; Surgeon:MERCEDEZ CHAUHAN  "D; Location:[facility]       Social History     Tobacco Use    Smoking status: Never    Smokeless tobacco: Never   Substance Use Topics    Alcohol use: Yes     Comment: occasional     Family History   Problem Relation Age of Onset    Cancer Mother         Brain - Cause of Death    Crohn's Disease Father         Cause of Death    Crohn's Disease Sister              Current Outpatient Medications   Medication Sig Dispense Refill    ACETAMINOPHEN EXTRA STRENGTH 500 MG tablet       aspirin 81 MG EC tablet Take 1 tablet (81 mg) by mouth daily.      atorvastatin (LIPITOR) 20 MG tablet Take 1 tablet (20 mg) by mouth daily 90 tablet 3    losartan (COZAAR) 50 MG tablet Take 1 tablet (50 mg) by mouth daily 90 tablet 3         Allergies   Allergen Reactions    No Clinical Screening - See Comments      Seasonal allergies         Recent Labs   Lab Test 09/18/24  0844 03/15/24  0929 09/29/22  1350 08/02/22  1538 08/04/21  1412 07/09/21  1356 05/17/21  1609   A1C  --   --  5.6  --   --   --   --    LDL 65 58  --  64   < > 93  --    HDL 46 48  --  47   < > 39*  --    TRIG 120 139  --  112   < > 277*  --    ALT 33 42  --  48   < > 54 47   CR 0.95 0.87  --  0.99   < > 0.95 0.99   GFRESTIMATED >90 >90  --  88   < > 88 84   GFRESTBLACK  --   --   --   --   --  >90 >90   POTASSIUM 3.8 4.2  --  4.0   < > 3.7 3.7   TSH 3.38 2.65  --   --    < >  --   --     < > = values in this interval not displayed.                   Review of Systems  Constitutional, HEENT, cardiovascular, pulmonary, GI, , musculoskeletal, neuro, skin, endocrine and psych systems are negative, except as otherwise noted.         Objective    Exam  /68 (BP Location: Left arm, Patient Position: Sitting, Cuff Size: Adult Regular)   Pulse 69   Temp 97.5  F (36.4  C) (Tympanic)   Resp 20   Ht 1.76 m (5' 9.29\")   Wt 97.5 kg (214 lb 14.4 oz)   SpO2 94%   BMI 31.47 kg/m     Estimated body mass index is 31.47 kg/m  as calculated from the following:    Height as of this " "encounter: 1.76 m (5' 9.29\").    Weight as of this encounter: 97.5 kg (214 lb 14.4 oz).      Physical Exam  GENERAL: alert and no distress  EYES: Eyes grossly normal to inspection, PERRL and conjunctivae and sclerae normal  HENT: ear canals and TM's normal, nose and mouth without ulcers or lesions  NECK: no adenopathy, no asymmetry, masses, or scars  RESP: lungs clear to auscultation - no rales, rhonchi or wheezes  CV: regular rate and rhythm, normal S1 S2, no S3 or S4, no murmur, click or rub, no peripheral edema  ABDOMEN: soft, nontender, no hepatosplenomegaly, no masses and bowel sounds normal  MS: no gross musculoskeletal defects noted, no edema  SKIN: no suspicious lesions or rashes  PSYCH: mentation appears normal, affect normal/bright        The longitudinal plan of care for the diagnosis(es)/condition(s) as documented were addressed during this visit. Due to the added complexity in care, I will continue to support Jesús in the subsequent management and with ongoing continuity of care.         Signed Electronically by: Laury Trivedi CNP    "

## 2025-03-19 NOTE — PATIENT INSTRUCTIONS
Assessment & Plan         Routine general medical examination at a health care facility  - CBC with platelets and differential      Essential hypertension - stable  - Low saturated fat diet  - Comprehensive metabolic panel  - Lipid Profile (Chol, Trig, HDL, LDL calc)  - TSH with free T4 reflex  - UA Macroscopic with reflex to Microscopic and Culture  - losartan (COZAAR) 50 MG tablet; Take 1 tablet (50 mg) by mouth daily.      Hyperlipidemia LDL goal <100 - stable  - Low saturated fat diet  - Comprehensive metabolic panel  - Lipid Profile (Chol, Trig, HDL, LDL calc)  - atorvastatin (LIPITOR) 20 MG tablet; Take 1 tablet (20 mg) by mouth daily.      Chronic midline low back pain without sciatica - stable  - Follow-up as needed      Anxiety - stable  - Follow-up as needed      Screening for prostate cancer  - PSA, screen        Patient has been advised of split billing requirements and indicates understanding: Yes            Return in about 6 months (around 9/19/2025).        All chronic conditions are stable at this time  Please continue to take all medication as directed  Please notify your pharmacy or our refill line of future refills needed.  Please return sooner than your next scheduled appointment with any concerns.        When your lab results return, we will call you with abnormal findings  You can also view this information in your MyChart, if you have an account.  Please call or [employer] message us with any questions you may have.          Laury SCOTTVA [facility]  [phone]       Preventive Care Advice   This is general advice given by our system to help you stay healthy. However, your care team may have specific advice just for you. Please talk to your care team about your preventive care needs.  Nutrition  Eat 5 or more servings of fruits and vegetables each day.  Try wheat bread, brown rice and whole grain pasta (instead of white bread, rice, and pasta).  Get enough calcium and vitamin D. Check the label on  foods and aim for 100% of the RDA (recommended daily allowance).  Lifestyle  Exercise at least 150 minutes each week  (30 minutes a day, 5 days a week).  Do muscle strengthening activities 2 days a week. These help control your weight and prevent disease.  No smoking.  Wear sunscreen to prevent skin cancer.  Have a dental exam and cleaning every 6 months.  Yearly exams  See your health care team every year to talk about:  Any changes in your health.  Any medicines your care team has prescribed.  Preventive care, family planning, and ways to prevent chronic diseases.  Shots (vaccines)   HPV shots (up to age 26), if you've never had them before.  Hepatitis B shots (up to age 59), if you've never had them before.  COVID-19 shot: Get this shot when it's due.  Flu shot: Get a flu shot every year.  Tetanus shot: Get a tetanus shot every 10 years.  Pneumococcal, hepatitis A, and RSV shots: Ask your care team if you need these based on your risk.  Shingles shot (for age 50 and up)  General health tests  Diabetes screening:  Starting at age 35, Get screened for diabetes at least every 3 years.  If you are younger than age 35, ask your care team if you should be screened for diabetes.  Cholesterol test: At age 39, start having a cholesterol test every 5 years, or more often if advised.  Bone density scan (DEXA): At age 50, ask your care team if you should have this scan for osteoporosis (brittle bones).  Hepatitis C: Get tested at least once in your life.  STIs (sexually transmitted infections)  Before age 24: Ask your care team if you should be screened for STIs.  After age 24: Get screened for STIs if you're at risk. You are at risk for STIs (including HIV) if:  You are sexually active with more than one person.  You don't use condoms every time.  You or a partner was diagnosed with a sexually transmitted infection.  If you are at risk for HIV, ask about PrEP medicine to prevent HIV.  Get tested for HIV at least once in  your life, whether you are at risk for HIV or not.  Cancer screening tests  Cervical cancer screening: If you have a cervix, begin getting regular cervical cancer screening tests starting at age 21.  Breast cancer scan (mammogram): If you've ever had breasts, begin having regular mammograms starting at age 40. This is a scan to check for breast cancer.  Colon cancer screening: It is important to start screening for colon cancer at age 45.  Have a colonoscopy test every 10 years (or more often if you're at risk) Or, ask your provider about stool tests like a FIT test every year or Cologuard test every 3 years.  To learn more about your testing options, visit:   .  For help making a decision, visit:   https://bit.ly/qf57947.  Prostate cancer screening test: If you have a prostate, ask your care team if a prostate cancer screening test (PSA) at age 55 is right for you.  Lung cancer screening: If you are a current or former smoker ages 50 to 80, ask your care team if ongoing lung cancer screenings are right for you.  For informational purposes only. Not to replace the advice of your health care provider. Copyright   2023 [employer]. All rights reserved. Clinically reviewed by the [facility]. [employer] [id number] - REV 01/24.  Learning About Stress  What is stress?     Stress is your body's response to a hard situation. Your body can have a physical, emotional, or mental response. Stress is a fact of life for most people, and it affects everyone differently. What causes stress for you may not be stressful for someone else.  A lot of things can cause stress. You may feel stress when you go on a job interview, take a test, or run a race. This kind of short-term stress is normal and even useful. It can help you if you need to work hard or react quickly. For example, stress can help you finish an important job on time.  Long-term stress is caused by ongoing stressful situations or  events. Examples of long-term stress include long-term health problems, ongoing problems at work, or conflicts in your family. Long-term stress can harm your health.  How does stress affect your health?  When you are stressed, your body responds as though you are in danger. It makes hormones that speed up your heart, make you breathe faster, and give you a burst of energy. This is called the fight-or-flight stress response. If the stress is over quickly, your body goes back to normal and no harm is done.  But if stress happens too often or lasts too long, it can have bad effects. Long-term stress can make you more likely to get sick, and it can make symptoms of some diseases worse. If you tense up when you are stressed, you may develop neck, shoulder, or low back pain. Stress is linked to high blood pressure and heart disease.  Stress also harms your emotional health. It can make you garzon, tense, or depressed. Your relationships may suffer, and you may not do well at work or school.  What can you do to manage stress?  You can try these things to help manage stress:   Do something active. Exercise or activity can help reduce stress. Walking is a great way to get started. Even everyday activities such as housecleaning or yard work can help.  Try yoga or mary chi. These techniques combine exercise and meditation. You may need some training at first to learn them.  Do something you enjoy. For example, listen to music or go to a movie. Practice your hobby or do volunteer work.  Meditate. This can help you relax, because you are not worrying about what happened before or what may happen in the future.  Do guided imagery. Imagine yourself in any setting that helps you feel calm. You can use online videos, books, or a teacher to guide you.  Do breathing exercises. For example:  From a standing position, bend forward from the waist with your knees slightly bent. Let your arms dangle close to the floor.  Breathe in slowly and  "deeply as you return to a standing position. Roll up slowly and lift your head last.  Hold your breath for just a few seconds in the standing position.  Breathe out slowly and bend forward from the waist.  Let your feelings out. Talk, laugh, cry, and express anger when you need to. Talking with supportive friends or family, a counselor, or a [employer] about your feelings is a healthy way to relieve stress. Avoid discussing your feelings with people who make you feel worse.  Write. It may help to write about things that are bothering you. This helps you find out how much stress you feel and what is causing it. When you know this, you can find better ways to cope.  What can you do to prevent stress?  You might try some of these things to help prevent stress:  Manage your time. This helps you find time to do the things you want and need to do.  Get enough sleep. Your body recovers from the stresses of the day while you are sleeping.  Get support. Your family, friends, and community can make a difference in how you experience stress.  Limit your news feed. Avoid or limit time on social media or news that may make you feel stressed.  Do something active. Exercise or activity can help reduce stress. Walking is a great way to get started.  Where can you learn more?  Go to https://www.[employer].net/patiented  Enter N032 in the search box to learn more about \"Learning About Stress.\"  Current as of: October 24, 2024  Content Version: 14.4    7883-7180 [employer].   Care instructions adapted under license by your healthcare professional. If you have questions about a medical condition or this instruction, always ask your healthcare professional. [employer] disclaims any warranty or liability for your use of this information.       "

## (undated) DEVICE — CONNECTOR-ERBEFLO 2 PORT

## (undated) DEVICE — FORCEP-COLON BIOPSY LARGE W/NEEDLE 240CM

## (undated) DEVICE — SENSOR-OXISENSOR II ADULT

## (undated) DEVICE — IRRIGATION-H2O 1000ML

## (undated) DEVICE — CANISTER-SUCTION 2000CC

## (undated) DEVICE — TUBING-SUCTION 20FT